# Patient Record
Sex: FEMALE | Race: WHITE | Employment: UNEMPLOYED | URBAN - METROPOLITAN AREA
[De-identification: names, ages, dates, MRNs, and addresses within clinical notes are randomized per-mention and may not be internally consistent; named-entity substitution may affect disease eponyms.]

---

## 2017-02-21 RX ORDER — NITROGLYCERIN 0.4 MG/1
0.4 TABLET SUBLINGUAL EVERY 5 MIN PRN
Qty: 25 TABLET | Refills: 0 | Status: SHIPPED | OUTPATIENT
Start: 2017-02-21 | End: 2017-02-22 | Stop reason: SDUPTHER

## 2017-02-22 ENCOUNTER — OFFICE VISIT (OUTPATIENT)
Dept: CARDIOLOGY CLINIC | Age: 42
End: 2017-02-22

## 2017-02-22 VITALS
OXYGEN SATURATION: 95 % | BODY MASS INDEX: 45.65 KG/M2 | SYSTOLIC BLOOD PRESSURE: 124 MMHG | DIASTOLIC BLOOD PRESSURE: 74 MMHG | HEIGHT: 61 IN | HEART RATE: 83 BPM | WEIGHT: 241.8 LBS

## 2017-02-22 DIAGNOSIS — Z72.0 TOBACCO ABUSE: Primary | ICD-10-CM

## 2017-02-22 DIAGNOSIS — I25.10 CORONARY ARTERY DISEASE INVOLVING NATIVE HEART WITHOUT ANGINA PECTORIS, UNSPECIFIED VESSEL OR LESION TYPE: ICD-10-CM

## 2017-02-22 DIAGNOSIS — I10 ESSENTIAL HYPERTENSION: ICD-10-CM

## 2017-02-22 DIAGNOSIS — E78.00 PURE HYPERCHOLESTEROLEMIA: ICD-10-CM

## 2017-02-22 PROCEDURE — 99214 OFFICE O/P EST MOD 30 MIN: CPT | Performed by: NURSE PRACTITIONER

## 2017-02-22 RX ORDER — PANTOPRAZOLE SODIUM 40 MG/1
40 TABLET, DELAYED RELEASE ORAL DAILY
Qty: 30 TABLET | Refills: 3 | Status: SHIPPED | OUTPATIENT
Start: 2017-02-22 | End: 2017-04-03 | Stop reason: ALTCHOICE

## 2017-02-22 RX ORDER — HYDROCHLOROTHIAZIDE 25 MG/1
25 TABLET ORAL DAILY
Qty: 30 TABLET | Refills: 3 | Status: SHIPPED | OUTPATIENT
Start: 2017-02-22 | End: 2018-02-15 | Stop reason: SDUPTHER

## 2017-02-22 RX ORDER — LISINOPRIL 5 MG/1
5 TABLET ORAL DAILY
Qty: 30 TABLET | Refills: 6 | Status: SHIPPED | OUTPATIENT
Start: 2017-02-22 | End: 2018-02-15 | Stop reason: SDUPTHER

## 2017-02-22 RX ORDER — NITROGLYCERIN 0.4 MG/1
0.4 TABLET SUBLINGUAL EVERY 5 MIN PRN
Qty: 25 TABLET | Refills: 0 | Status: SHIPPED | OUTPATIENT
Start: 2017-02-22 | End: 2017-06-30 | Stop reason: SDUPTHER

## 2017-02-22 RX ORDER — SIMVASTATIN 40 MG
40 TABLET ORAL NIGHTLY
Qty: 30 TABLET | Refills: 5 | Status: SHIPPED | OUTPATIENT
Start: 2017-02-22 | End: 2018-02-15 | Stop reason: SDUPTHER

## 2017-02-22 RX ORDER — AMLODIPINE BESYLATE 5 MG/1
2.5 TABLET ORAL DAILY
Qty: 30 TABLET | Refills: 6 | Status: SHIPPED | OUTPATIENT
Start: 2017-02-22 | End: 2018-02-15 | Stop reason: SDUPTHER

## 2017-02-27 ENCOUNTER — TELEPHONE (OUTPATIENT)
Dept: CARDIOLOGY CLINIC | Age: 42
End: 2017-02-27

## 2017-03-30 ENCOUNTER — TELEPHONE (OUTPATIENT)
Dept: CARDIOLOGY CLINIC | Age: 42
End: 2017-03-30

## 2017-04-03 ENCOUNTER — OFFICE VISIT (OUTPATIENT)
Dept: CARDIOLOGY CLINIC | Age: 42
End: 2017-04-03

## 2017-04-03 VITALS
HEIGHT: 61 IN | BODY MASS INDEX: 45.31 KG/M2 | DIASTOLIC BLOOD PRESSURE: 80 MMHG | SYSTOLIC BLOOD PRESSURE: 140 MMHG | WEIGHT: 240 LBS

## 2017-04-03 DIAGNOSIS — R07.9 CHEST PAIN, UNSPECIFIED TYPE: Primary | ICD-10-CM

## 2017-04-03 DIAGNOSIS — I25.10 CORONARY ARTERY DISEASE INVOLVING NATIVE CORONARY ARTERY OF NATIVE HEART WITHOUT ANGINA PECTORIS: ICD-10-CM

## 2017-04-03 PROCEDURE — 99214 OFFICE O/P EST MOD 30 MIN: CPT | Performed by: INTERNAL MEDICINE

## 2017-04-03 PROCEDURE — 93000 ELECTROCARDIOGRAM COMPLETE: CPT | Performed by: INTERNAL MEDICINE

## 2017-04-03 RX ORDER — ASPIRIN 81 MG/1
TABLET, CHEWABLE ORAL
Qty: 30 TABLET | Refills: 3 | Status: ON HOLD
Start: 2017-04-03 | End: 2020-03-13

## 2017-06-07 ENCOUNTER — TELEPHONE (OUTPATIENT)
Dept: CARDIOLOGY CLINIC | Age: 42
End: 2017-06-07

## 2017-06-08 RX ORDER — PANTOPRAZOLE SODIUM 40 MG/1
40 TABLET, DELAYED RELEASE ORAL DAILY
Qty: 30 TABLET | Refills: 5 | Status: SHIPPED | OUTPATIENT
Start: 2017-06-08 | End: 2017-12-29 | Stop reason: SDUPTHER

## 2017-06-30 RX ORDER — NITROGLYCERIN 0.4 MG/1
0.4 TABLET SUBLINGUAL EVERY 5 MIN PRN
Qty: 25 TABLET | Refills: 1 | Status: SHIPPED | OUTPATIENT
Start: 2017-06-30 | End: 2017-08-24 | Stop reason: SDUPTHER

## 2017-08-24 RX ORDER — NITROGLYCERIN 0.4 MG/1
0.4 TABLET SUBLINGUAL EVERY 5 MIN PRN
Qty: 25 TABLET | Refills: 1 | Status: SHIPPED | OUTPATIENT
Start: 2017-08-24 | End: 2019-09-06 | Stop reason: SDUPTHER

## 2017-12-14 ENCOUNTER — TELEPHONE (OUTPATIENT)
Dept: CARDIOLOGY CLINIC | Age: 42
End: 2017-12-14

## 2017-12-29 RX ORDER — PANTOPRAZOLE SODIUM 40 MG/1
40 TABLET, DELAYED RELEASE ORAL DAILY
Qty: 30 TABLET | Refills: 5 | Status: SHIPPED | OUTPATIENT
Start: 2017-12-29 | End: 2019-03-22

## 2018-02-14 ENCOUNTER — OFFICE VISIT (OUTPATIENT)
Dept: CARDIOLOGY CLINIC | Age: 43
End: 2018-02-14

## 2018-02-14 VITALS
SYSTOLIC BLOOD PRESSURE: 140 MMHG | BODY MASS INDEX: 43.23 KG/M2 | WEIGHT: 229 LBS | HEIGHT: 61 IN | DIASTOLIC BLOOD PRESSURE: 90 MMHG | HEART RATE: 70 BPM

## 2018-02-14 DIAGNOSIS — I10 ESSENTIAL HYPERTENSION: ICD-10-CM

## 2018-02-14 DIAGNOSIS — Z72.0 TOBACCO ABUSE: ICD-10-CM

## 2018-02-14 DIAGNOSIS — R07.9 CHEST PAIN, UNSPECIFIED TYPE: Primary | ICD-10-CM

## 2018-02-14 DIAGNOSIS — E78.2 MIXED HYPERLIPIDEMIA: ICD-10-CM

## 2018-02-14 DIAGNOSIS — I25.10 CORONARY ARTERY DISEASE INVOLVING NATIVE CORONARY ARTERY OF NATIVE HEART WITHOUT ANGINA PECTORIS: ICD-10-CM

## 2018-02-14 PROCEDURE — 99214 OFFICE O/P EST MOD 30 MIN: CPT | Performed by: NURSE PRACTITIONER

## 2018-02-14 PROCEDURE — 93000 ELECTROCARDIOGRAM COMPLETE: CPT | Performed by: NURSE PRACTITIONER

## 2018-02-14 PROCEDURE — G8427 DOCREV CUR MEDS BY ELIG CLIN: HCPCS | Performed by: NURSE PRACTITIONER

## 2018-02-14 PROCEDURE — G8484 FLU IMMUNIZE NO ADMIN: HCPCS | Performed by: NURSE PRACTITIONER

## 2018-02-14 PROCEDURE — G8417 CALC BMI ABV UP PARAM F/U: HCPCS | Performed by: NURSE PRACTITIONER

## 2018-02-14 PROCEDURE — G8598 ASA/ANTIPLAT THER USED: HCPCS | Performed by: NURSE PRACTITIONER

## 2018-02-14 PROCEDURE — 4004F PT TOBACCO SCREEN RCVD TLK: CPT | Performed by: NURSE PRACTITIONER

## 2018-02-14 RX ORDER — HYDROCHLOROTHIAZIDE 25 MG/1
25 TABLET ORAL DAILY
Qty: 30 TABLET | Refills: 6 | Status: CANCELLED | OUTPATIENT
Start: 2018-02-14

## 2018-02-14 RX ORDER — LISINOPRIL 5 MG/1
5 TABLET ORAL DAILY
Qty: 30 TABLET | Refills: 6 | Status: CANCELLED | OUTPATIENT
Start: 2018-02-14

## 2018-02-14 RX ORDER — CLOPIDOGREL BISULFATE 75 MG/1
75 TABLET ORAL DAILY
Qty: 30 TABLET | Refills: 5 | Status: SHIPPED | OUTPATIENT
Start: 2018-02-14 | End: 2018-12-12 | Stop reason: SDUPTHER

## 2018-02-14 RX ORDER — AMLODIPINE BESYLATE 5 MG/1
2.5 TABLET ORAL DAILY
Qty: 30 TABLET | Refills: 6 | Status: CANCELLED | OUTPATIENT
Start: 2018-02-14

## 2018-02-14 RX ORDER — SIMVASTATIN 40 MG
40 TABLET ORAL NIGHTLY
Qty: 30 TABLET | Refills: 6 | Status: CANCELLED | OUTPATIENT
Start: 2018-02-14

## 2018-02-14 RX ORDER — IBUPROFEN 200 MG
200 TABLET ORAL EVERY 4 HOURS
COMMUNITY
End: 2019-02-06

## 2018-02-14 NOTE — COMMUNICATION BODY
normal  ~LAD Patent stents  ~Cx 90% mid but small and nondominant (no change from prior angio)  ~RCA 90% mid napkin ring  ~LVG 55%     Impression  ~Angiography w/ disease as above  ~LVEDP 18  ~LVG with normal EF     Intervention  ~PCI of RCA with [id number] MARC postdilated to 3.75     Recommendation  ~Aggressive medical treatment and risk factor modification       films reviewed with DR. Jasmin Gonzalez at last OV , does have lesion (small) in cfx that could cause angina, states she has tired ranexa in the past and did not help,  No increase in angina, does not like to use NTG    3. Chronic kidney disease 1/16 BUN/CR 12/0.5, needs blood work  4. Unstable angina - about the same  5. Anxiety --continues to struggle with emotional and family stress. 6. COPD (chronic obstructive pulmonary disease) -due to tobacco abuse    7. Diabetes mellitus --medically managed per PCP   8. Hypertension --stable     9. Hyperlipidemia --10/15  hdl 31 ldl 129 Tri 377-- , will get labs  10. Anemia 3/16 13/41.3  11. Edema-resolved on lower dose of norvasc    Plan:     D/w her need for PCP to follow reflux and anemia, arthritis  Smoking cessation  Restart cardiac meds   OV 6 weeks  Thank you for allowing to us to participate in the care of [facility].       [address]

## 2018-02-14 NOTE — LETTER
HEENT: Sclera non-icteric, normocephalic, neck supple, no elevation of JVP, normal carotid pulses with no bruits and thyroid normal size. LUNGS:  No increased work of breathing and clear to auscultation, no crackles or wheezing. CARDIOVASCULAR:  Regular rate and rhythm with no murmurs, gallops, rubs, or abnormal heart sounds, normal PMI. The apical impulses not displaced. Heart tones are crisp and normal.  EKG today reviewed by me no changes Cervical veins are not engorged                 JVP less than 8 cm H2O                                                                            The carotid upstroke is normal in amplitude and contour without delay or bruit    ABDOMEN:  Normal bowel sounds, non-distended and non-tender to palpation   EXT: No edema, no calf tenderness. Pulses are present bilaterally.     DATA:    Lab Results   Component Value Date    ALT 18 03/30/2015    AST 20 03/30/2015    ALKPHOS 83 03/30/2015    BILITOT 0.4 03/30/2015     Lab Results   Component Value Date    CREATININE 1.0 01/20/2017    BUN 18 01/20/2017     01/20/2017    K 4.1 01/20/2017    CL 97 (L) 01/20/2017    CO2 27 01/20/2017     Lab Results   Component Value Date    TSH 0.51 03/30/2015    M7TBXVZ 5.7 03/30/2015     Lab Results   Component Value Date    WBC 13.6 (H) 04/05/2017    HGB 11.9 (L) 01/20/2017    HCT 38.0 01/20/2017    MCV 70.7 (L) 01/20/2017     04/05/2017     No components found for: CHLPL  Lab Results   Component Value Date    TRIG 250 (H) 03/28/2016    TRIG 377 (H) 10/05/2015    TRIG 104 08/28/2012     Lab Results   Component Value Date    HDL 30 (L) 03/28/2016    HDL 31 (L) 10/05/2015    HDL 29 (L) 08/28/2012     Lab Results   Component Value Date    LDLCALC 85 03/28/2016    LDLCALC see below 10/05/2015    LDLCALC 60 08/28/2012     Lab Results   Component Value Date    LABVLDL 50 03/28/2016    LABVLDL see below 10/05/2015    LABVLDL 21 08/28/2012       Assessment:

## 2018-02-14 NOTE — TELEPHONE ENCOUNTER
Patient was seen in office yesterday. She needs the rest of her refills and she mentioned you were going to send a RX for refulx? She said Pepcid. I did not see anything in the chart.

## 2018-02-14 NOTE — PROGRESS NOTES
tablet 6    hydrochlorothiazide (HYDRODIURIL) 25 MG tablet Take 1 tablet by mouth daily 30 tablet 3    amLODIPine (NORVASC) 5 MG tablet Take 0.5 tablets by mouth daily 30 tablet 6    simvastatin (ZOCOR) 40 MG tablet Take 1 tablet by mouth nightly 30 tablet 5     Current Facility-Administered Medications   Medication Dose Route Frequency Provider Last Rate Last Dose    ticagrelor (BRILINTA) tablet 90 mg  90 mg Oral BID Madonna Bender MD         REVIEW OF SYSTEMS:   CONSTITUTIONAL: No major weight gain or loss, fatigue, weakness, night sweats or fever. There's been no change in energy level, sleep pattern, or activity level. HEENT: No new vision difficulties or ringing in the ears. RESPIRATORY: No new SOB, PND, orthopnea or cough. CARDIOVASCULAR: See HPI + chest pain  GI: No nausea, vomiting, diarrhea, constipation, abdominal pain or changes in bowel habits. : No urinary frequency, urgency, incontinence hematuria or dysuria. SKIN: No cyanosis or skin lesions. MUSCULOSKELETAL: No new muscle or joint pain. NEUROLOGICAL: No syncope or TIA-like symptoms. PSYCHIATRIC: No anxiety, pain, insomnia or depression    Objective:   PHYSICAL EXAM:        VITALS:    BP (!) 140/90   Pulse 70   Ht 5' 1\" (1.549 m)   Wt 229 lb (103.9 kg)   LMP 10/15/2012   BMI 43.27 kg/m²     CONSTITUTIONAL: Cooperative, no apparent distress, and appears well nourished / developed  NEUROLOGIC:  Awake and orientated to person, place and time. PSYCH: Calm affect. SKIN: Warm and dry. HEENT: Sclera non-icteric, normocephalic, neck supple, no elevation of JVP, normal carotid pulses with no bruits and thyroid normal size. LUNGS:  No increased work of breathing and clear to auscultation, no crackles or wheezing. CARDIOVASCULAR:  Regular rate and rhythm with no murmurs, gallops, rubs, or abnormal heart sounds, normal PMI. The apical impulses not displaced.         Heart tones are crisp and normal.  EKG today reviewed by me no changes Cervical veins are not engorged                 JVP less than 8 cm H2O                                                                            The carotid upstroke is normal in amplitude and contour without delay or bruit    ABDOMEN:  Normal bowel sounds, non-distended and non-tender to palpation   EXT: No edema, no calf tenderness. Pulses are present bilaterally. DATA:    Lab Results   Component Value Date    ALT 18 03/30/2015    AST 20 03/30/2015    ALKPHOS 83 03/30/2015    BILITOT 0.4 03/30/2015     Lab Results   Component Value Date    CREATININE 1.0 01/20/2017    BUN 18 01/20/2017     01/20/2017    K 4.1 01/20/2017    CL 97 (L) 01/20/2017    CO2 27 01/20/2017     Lab Results   Component Value Date    TSH 0.51 03/30/2015    I2VXMOU 5.7 03/30/2015     Lab Results   Component Value Date    WBC 13.6 (H) 04/05/2017    HGB 11.9 (L) 01/20/2017    HCT 38.0 01/20/2017    MCV 70.7 (L) 01/20/2017     04/05/2017     No components found for: CHLPL  Lab Results   Component Value Date    TRIG 250 (H) 03/28/2016    TRIG 377 (H) 10/05/2015    TRIG 104 08/28/2012     Lab Results   Component Value Date    HDL 30 (L) 03/28/2016    HDL 31 (L) 10/05/2015    HDL 29 (L) 08/28/2012     Lab Results   Component Value Date    LDLCALC 85 03/28/2016    LDLCALC see below 10/05/2015    LDLCALC 60 08/28/2012     Lab Results   Component Value Date    LABVLDL 50 03/28/2016    LABVLDL see below 10/05/2015    LABVLDL 21 08/28/2012       Assessment:     1. Tobacco abuse --continues to smoke 2PPD, understands risk  nicoderm patches, (I have tried just about everything and I really want to quit)      2. CAD-  3/15 cath: Patent stent LAD  Mild diffuse disease in Circ. 2 patent stents in RCA with 60% lesion in proximal area of distal stent.   LV gram shows basilar hypokinesia with LVEF = 55%    7/13 cath : prev LAD stent patent, 70% cx lesion (very small vessel)  Stent to RCA times 2       1/7/16  [facility] SUMMARY  ~LM

## 2018-02-15 ENCOUNTER — TELEPHONE (OUTPATIENT)
Dept: CARDIOLOGY CLINIC | Age: 43
End: 2018-02-15

## 2018-02-15 RX ORDER — SIMVASTATIN 40 MG
40 TABLET ORAL NIGHTLY
Qty: 30 TABLET | Refills: 6 | Status: SHIPPED | OUTPATIENT
Start: 2018-02-15 | End: 2018-09-20 | Stop reason: SDUPTHER

## 2018-02-15 RX ORDER — FAMOTIDINE 20 MG/1
20 TABLET, FILM COATED ORAL DAILY
Qty: 60 TABLET | Refills: 0 | Status: SHIPPED | OUTPATIENT
Start: 2018-02-15 | End: 2018-05-17 | Stop reason: SDUPTHER

## 2018-02-15 RX ORDER — AMLODIPINE BESYLATE 5 MG/1
2.5 TABLET ORAL DAILY
Qty: 30 TABLET | Refills: 6 | Status: SHIPPED | OUTPATIENT
Start: 2018-02-15 | End: 2018-05-09

## 2018-02-15 RX ORDER — HYDROCHLOROTHIAZIDE 25 MG/1
25 TABLET ORAL DAILY
Qty: 30 TABLET | Refills: 6 | Status: SHIPPED | OUTPATIENT
Start: 2018-02-15 | End: 2019-03-22

## 2018-02-15 RX ORDER — LISINOPRIL 5 MG/1
5 TABLET ORAL DAILY
Qty: 30 TABLET | Refills: 6 | Status: SHIPPED | OUTPATIENT
Start: 2018-02-15 | End: 2018-05-09

## 2018-02-15 NOTE — TELEPHONE ENCOUNTER
Called pt refilled her meds that she needed. She stated that you were going to call in pepcid for her? I sent over pepcid 20 mg daily #30 days no refills to buddy--Spoke with Meghan HUFF and we asked  which dose to send in.

## 2018-02-19 NOTE — TELEPHONE ENCOUNTER
Pt called very anxious for lab results which were done on 2/14/18 at [address] [place] [address]. Pt has called twice today 2/19/18 and would like to know the results. Please call as soon as they are in.     Thank you CSF

## 2018-02-21 DIAGNOSIS — E78.00 PURE HYPERCHOLESTEROLEMIA: Primary | ICD-10-CM

## 2018-05-08 ENCOUNTER — TELEPHONE (OUTPATIENT)
Dept: CARDIOLOGY CLINIC | Age: 43
End: 2018-05-08

## 2018-05-09 ENCOUNTER — OFFICE VISIT (OUTPATIENT)
Dept: CARDIOLOGY CLINIC | Age: 43
End: 2018-05-09

## 2018-05-09 VITALS
HEIGHT: 61 IN | WEIGHT: 229 LBS | BODY MASS INDEX: 43.23 KG/M2 | HEART RATE: 70 BPM | SYSTOLIC BLOOD PRESSURE: 144 MMHG | DIASTOLIC BLOOD PRESSURE: 60 MMHG

## 2018-05-09 DIAGNOSIS — I25.10 CORONARY ARTERY DISEASE INVOLVING NATIVE CORONARY ARTERY OF NATIVE HEART WITHOUT ANGINA PECTORIS: Primary | ICD-10-CM

## 2018-05-09 DIAGNOSIS — I10 ESSENTIAL HYPERTENSION: ICD-10-CM

## 2018-05-09 DIAGNOSIS — E78.00 PURE HYPERCHOLESTEROLEMIA: ICD-10-CM

## 2018-05-09 PROCEDURE — G8598 ASA/ANTIPLAT THER USED: HCPCS | Performed by: NURSE PRACTITIONER

## 2018-05-09 PROCEDURE — 99214 OFFICE O/P EST MOD 30 MIN: CPT | Performed by: NURSE PRACTITIONER

## 2018-05-09 PROCEDURE — G8417 CALC BMI ABV UP PARAM F/U: HCPCS | Performed by: NURSE PRACTITIONER

## 2018-05-09 PROCEDURE — G8428 CUR MEDS NOT DOCUMENT: HCPCS | Performed by: NURSE PRACTITIONER

## 2018-05-09 PROCEDURE — 93000 ELECTROCARDIOGRAM COMPLETE: CPT | Performed by: NURSE PRACTITIONER

## 2018-05-09 PROCEDURE — 4004F PT TOBACCO SCREEN RCVD TLK: CPT | Performed by: NURSE PRACTITIONER

## 2018-05-09 RX ORDER — LISINOPRIL 10 MG/1
10 TABLET ORAL DAILY
Qty: 30 TABLET | Refills: 3 | Status: SHIPPED | OUTPATIENT
Start: 2018-05-09 | End: 2018-09-20 | Stop reason: SDUPTHER

## 2018-05-11 ENCOUNTER — TELEPHONE (OUTPATIENT)
Dept: CARDIOLOGY CLINIC | Age: 43
End: 2018-05-11

## 2018-05-15 ENCOUNTER — HOSPITAL ENCOUNTER (OUTPATIENT)
Dept: NON INVASIVE DIAGNOSTICS | Age: 43
Discharge: HOME | End: 2018-05-15
Attending: NURSE PRACTITIONER | Admitting: NURSE PRACTITIONER

## 2018-05-15 DIAGNOSIS — I25.10 ATHEROSCLEROTIC HEART DISEASE OF NATIVE CORONARY ARTERY WITHOUT ANGINA PECTORIS: ICD-10-CM

## 2018-05-15 LAB
LV EF: 66 %
LVEF MODALITY: NORMAL

## 2018-05-17 RX ORDER — FAMOTIDINE 20 MG/1
20 TABLET, FILM COATED ORAL DAILY
Qty: 60 TABLET | Refills: 0 | Status: SHIPPED | OUTPATIENT
Start: 2018-05-17 | End: 2019-03-22 | Stop reason: SDUPTHER

## 2018-09-05 ENCOUNTER — OFFICE VISIT (OUTPATIENT)
Dept: CARDIOLOGY CLINIC | Age: 43
End: 2018-09-05

## 2018-09-05 VITALS
HEART RATE: 78 BPM | DIASTOLIC BLOOD PRESSURE: 80 MMHG | WEIGHT: 229 LBS | SYSTOLIC BLOOD PRESSURE: 130 MMHG | BODY MASS INDEX: 43.27 KG/M2

## 2018-09-05 DIAGNOSIS — Z72.0 TOBACCO ABUSE: Primary | ICD-10-CM

## 2018-09-05 DIAGNOSIS — I25.10 CORONARY ARTERY DISEASE INVOLVING NATIVE CORONARY ARTERY OF NATIVE HEART WITHOUT ANGINA PECTORIS: ICD-10-CM

## 2018-09-05 DIAGNOSIS — I10 ESSENTIAL HYPERTENSION: ICD-10-CM

## 2018-09-05 DIAGNOSIS — E78.00 PURE HYPERCHOLESTEROLEMIA: ICD-10-CM

## 2018-09-05 PROCEDURE — 4004F PT TOBACCO SCREEN RCVD TLK: CPT | Performed by: NURSE PRACTITIONER

## 2018-09-05 PROCEDURE — G8598 ASA/ANTIPLAT THER USED: HCPCS | Performed by: NURSE PRACTITIONER

## 2018-09-05 PROCEDURE — G8417 CALC BMI ABV UP PARAM F/U: HCPCS | Performed by: NURSE PRACTITIONER

## 2018-09-05 PROCEDURE — 99214 OFFICE O/P EST MOD 30 MIN: CPT | Performed by: NURSE PRACTITIONER

## 2018-09-05 PROCEDURE — G8427 DOCREV CUR MEDS BY ELIG CLIN: HCPCS | Performed by: NURSE PRACTITIONER

## 2018-09-05 RX ORDER — ISOSORBIDE MONONITRATE 30 MG/1
30 TABLET, EXTENDED RELEASE ORAL DAILY
Qty: 30 TABLET | Refills: 5 | Status: SHIPPED | OUTPATIENT
Start: 2018-09-05 | End: 2019-02-06

## 2018-09-05 NOTE — PROGRESS NOTES
[facility]     Outpatient Follow Up Note    CHIEF COMPLAINT / HPI: OV for chest pain       Angelito Dose is 39 y.o. female who presents today for a FU visit. .  Subjective:    She continues to smoke 2PPD. She states she is back on her meds. Did d/w her in the past needs to get PCP to follow her reflux and hx of anemia. She has been stressed with numerous family issues. Compliance has been an issue.   She states she continues to get chest pain and using NTG with relief    Past Medical History:   Diagnosis Date    Allergic     Anemia     Asthma     CAD (coronary artery disease)     Stent LAD    Chronic back pain     back    Chronic kidney disease     kidney stones    [facility]     Diabetes mellitus ([facility] [address]     gestional diabetes only    GERD (gastroesophageal reflux disease)     Hyperlipidemia     Hypertension     Pancreatitis 10/2011    Pneumonia 2008    Psychiatric problem     anxiewty, depression    Ulcer     stomach     Social History:    History   Smoking Status    Current Every Day Smoker    Packs/day: 3.00    Years: 25.00   Smokeless Tobacco    Never Used     Comment: started smoking at age 14 / smoked up to 4 p.p.d / now smoking 2 to 3 p.p.d     Current Medications:  Current Outpatient Prescriptions   Medication Sig Dispense Refill    isosorbide mononitrate (IMDUR) 30 MG extended release tablet Take 1 tablet by mouth daily 30 tablet 5    lisinopril (PRINIVIL;ZESTRIL) 10 MG tablet Take 1 tablet by mouth daily 30 tablet 3    hydrochlorothiazide (HYDRODIURIL) 25 MG tablet Take 1 tablet by mouth daily (Patient taking differently: Take 25 mg by mouth PRN) 30 tablet 6    simvastatin (ZOCOR) 40 MG tablet Take 1 tablet by mouth nightly 30 tablet 6    clopidogrel (PLAVIX) 75 MG tablet Take 1 tablet by mouth daily 30 tablet 5    pantoprazole (PROTONIX) 40 MG tablet Take 1 tablet by mouth daily 30 tablet 5    nitroGLYCERIN (NITROSTAT)

## 2018-09-05 NOTE — LETTER
 hydrochlorothiazide (HYDRODIURIL) 25 MG tablet Take 1 tablet by mouth daily (Patient taking differently: Take 25 mg by mouth PRN) 30 tablet 6    simvastatin (ZOCOR) 40 MG tablet Take 1 tablet by mouth nightly 30 tablet 6    clopidogrel (PLAVIX) 75 MG tablet Take 1 tablet by mouth daily 30 tablet 5    pantoprazole (PROTONIX) 40 MG tablet Take 1 tablet by mouth daily 30 tablet 5    nitroGLYCERIN (NITROSTAT) 0.4 MG SL tablet Place 1 tablet under the tongue every 5 minutes as needed for Chest pain 25 tablet 1    aspirin 81 MG chewable tablet NOT TAKING DUE TO STOMACH ISSUES 30 tablet 3    famotidine (PEPCID) 20 MG tablet Take 1 tablet by mouth daily 60 tablet 0    ibuprofen (ADVIL;MOTRIN) 200 MG tablet Take 200 mg by mouth every 4 hours       Current Facility-Administered Medications   Medication Dose Route Frequency Provider Last Rate Last Dose    ticagrelor (BRILINTA) tablet 90 mg  90 mg Oral BID Julianna Goldman MD         REVIEW OF SYSTEMS:   CONSTITUTIONAL: No major weight gain or loss, fatigue, weakness, night sweats or fever. There's been no change in energy level, sleep pattern, or activity level. HEENT: No new vision difficulties or ringing in the ears. RESPIRATORY: No new SOB, PND, orthopnea or cough. CARDIOVASCULAR: See HPI + chest pain  GI: No nausea, vomiting, diarrhea, constipation, abdominal pain or changes in bowel habits. : No urinary frequency, urgency, incontinence hematuria or dysuria. SKIN: No cyanosis or skin lesions. MUSCULOSKELETAL: No new muscle or joint pain. NEUROLOGICAL: No syncope or TIA-like symptoms. PSYCHIATRIC: No anxiety, pain, insomnia or depression    Objective:   PHYSICAL EXAM:        VITALS:    /80   Pulse 78   Wt 229 lb (103.9 kg)   [facility] 10/15/2012   BMI 43.27 kg/m²      CONSTITUTIONAL: Cooperative, no apparent distress, and appears well nourished / developed  NEUROLOGIC:  Awake and orientated to person, place and time. PSYCH: Calm affect. 1. Tobacco abuse --continues to smoke 2PPD, understands risk   (I have tried just about everything and I really want to quit)      2. CAD-  3/15 cath: Patent stent LAD  Mild diffuse disease in Circ. 2 patent stents in RCA with 60% lesion in proximal area of distal stent. LV gram shows basilar hypokinesia with LVEF = 55%    7/13 cath : prev LAD stent patent, 70% cx lesion (very small vessel)  Stent to RCA times 2       1/7/16  [facility] SUMMARY  ~LM normal  ~LAD Patent stents  ~Cx 90% mid but small and nondominant (no change from prior angio)  ~RCA 90% mid napkin ring  ~LVG 55%     Impression  ~Angiography w/ disease as above  ~LVEDP 18  ~LVG with normal EF     Intervention  ~PCI of RCA with 3.1H27Jugeof MARC postdilated to 3.75     Recommendation  ~Aggressive medical treatment and risk factor modification       films reviewed with DR. Soo Peraza at last OV , does have lesion (small) in cfx that could cause angina, states she has tired ranexa in the past and did not help also caused rash, does not like to use NTG  GXT 7/18  Summary    Normal LV size and systolic function.    Left ventricular ejection fraction of 66 %.    There is a small sized, moderate intensity, minimally reversible    inferoapical wall defect, which is most consistent with attenuation    artifact. 3.  Chronic kidney disease 1/16 BUN/CR 12/0.5, needs blood work  4. Unstable angina - slightly worse  5. Anxiety --continues to struggle with emotional and family stress. 6. COPD (chronic obstructive pulmonary disease) -due to tobacco abuse    7. Diabetes mellitus --medically managed per PCP   8. Hypertension -- improved    9. Hyperlipidemia --10/15  hdl 31 ldl 129 Tri 377-- , will get labs  10. Anemia 3/16 13/41.3, will check  11. Edema-resolved    Plan:   Can NOT take ranexa-rash  norvasc-edema  Try Imdur 30 mg a day      C Reifenberger CNP  [address]    Thank you for allowing to us to participate in the care of [facility]. [address]    If you have questions, please do not hesitate to call me. I look forward to following Kole along with you.     Sincerely,        YOCASTA Sullivan - CNP

## 2018-09-05 NOTE — COMMUNICATION BODY
0.4 MG SL tablet Place 1 tablet under the tongue every 5 minutes as needed for Chest pain 25 tablet 1    aspirin 81 MG chewable tablet NOT TAKING DUE TO STOMACH ISSUES 30 tablet 3    famotidine (PEPCID) 20 MG tablet Take 1 tablet by mouth daily 60 tablet 0    ibuprofen (ADVIL;MOTRIN) 200 MG tablet Take 200 mg by mouth every 4 hours       Current Facility-Administered Medications   Medication Dose Route Frequency Provider Last Rate Last Dose    ticagrelor (BRILINTA) tablet 90 mg  90 mg Oral BID Jeffrey Ford MD         REVIEW OF SYSTEMS:   CONSTITUTIONAL: No major weight gain or loss, fatigue, weakness, night sweats or fever. There's been no change in energy level, sleep pattern, or activity level. HEENT: No new vision difficulties or ringing in the ears. RESPIRATORY: No new SOB, PND, orthopnea or cough. CARDIOVASCULAR: See HPI + chest pain  GI: No nausea, vomiting, diarrhea, constipation, abdominal pain or changes in bowel habits. : No urinary frequency, urgency, incontinence hematuria or dysuria. SKIN: No cyanosis or skin lesions. MUSCULOSKELETAL: No new muscle or joint pain. NEUROLOGICAL: No syncope or TIA-like symptoms. PSYCHIATRIC: No anxiety, pain, insomnia or depression    Objective:   PHYSICAL EXAM:        VITALS:    /80   Pulse 78   Wt 229 lb (103.9 kg)   LMP 10/15/2012   BMI 43.27 kg/m²      CONSTITUTIONAL: Cooperative, no apparent distress, and appears well nourished / developed  NEUROLOGIC:  Awake and orientated to person, place and time. PSYCH: Calm affect. SKIN: Warm and dry. HEENT: Sclera non-icteric, normocephalic, neck supple, no elevation of JVP, normal carotid pulses with no bruits and thyroid normal size. LUNGS:  No increased work of breathing and clear to auscultation, no crackles or wheezing. CARDIOVASCULAR:  Regular rate and rhythm with no murmurs, gallops, rubs, or abnormal heart sounds, normal PMI. The apical impulses not displaced.         Heart tones normal  ~LAD Patent stents  ~Cx 90% mid but small and nondominant (no change from prior angio)  ~RCA 90% mid napkin ring  ~LVG 55%     Impression  ~Angiography w/ disease as above  ~LVEDP 18  ~LVG with normal EF     Intervention  ~PCI of RCA with 3.1S51[place]ung MARC postdilated to 3.75     Recommendation  ~Aggressive medical treatment and risk factor modification       films reviewed with DR. Geeta Woodward at last OV , does have lesion (small) in cfx that could cause angina, states she has tired ranexa in the past and did not help also caused rash, does not like to use NTG  GXT 7/18  Summary    Normal LV size and systolic function.    Left ventricular ejection fraction of 66 %.    There is a small sized, moderate intensity, minimally reversible    inferoapical wall defect, which is most consistent with attenuation    artifact. 3.  Chronic kidney disease 1/16 BUN/CR 12/0.5, needs blood work  4. Unstable angina - slightly worse  5. Anxiety --continues to struggle with emotional and family stress. 6. COPD (chronic obstructive pulmonary disease) -due to tobacco abuse    7. Diabetes mellitus --medically managed per PCP   8. Hypertension -- improved    9. Hyperlipidemia --10/15  hdl 31 ldl 129 Tri 377-- , will get labs  10. Anemia 3/16 13/41.3, will check  11. Edema-resolved    Plan:   Can NOT take ranexa-rash  norvasc-edema  Try Imdur 30 mg a day      JAMES Harris CNP  [employer]    Thank you for allowing to us to participate in the care of Santiago Mj.       [address] [place] [address]

## 2018-09-10 ENCOUNTER — TELEPHONE (OUTPATIENT)
Dept: CARDIOLOGY CLINIC | Age: 43
End: 2018-09-10

## 2018-09-12 ENCOUNTER — TELEPHONE (OUTPATIENT)
Dept: CARDIOLOGY CLINIC | Age: 43
End: 2018-09-12

## 2018-09-20 RX ORDER — SIMVASTATIN 40 MG
40 TABLET ORAL NIGHTLY
Qty: 30 TABLET | Refills: 6 | Status: SHIPPED | OUTPATIENT
Start: 2018-09-20 | End: 2018-09-26 | Stop reason: SDUPTHER

## 2018-09-20 RX ORDER — LISINOPRIL 10 MG/1
10 TABLET ORAL DAILY
Qty: 30 TABLET | Refills: 6 | Status: SHIPPED | OUTPATIENT
Start: 2018-09-20 | End: 2019-09-06 | Stop reason: SDUPTHER

## 2018-09-24 ENCOUNTER — TELEPHONE (OUTPATIENT)
Dept: CARDIOLOGY CLINIC | Age: 43
End: 2018-09-24

## 2018-09-24 DIAGNOSIS — E78.49 OTHER HYPERLIPIDEMIA: Primary | ICD-10-CM

## 2018-09-26 RX ORDER — SIMVASTATIN 80 MG
80 TABLET ORAL NIGHTLY
Qty: 90 TABLET | Refills: 3 | Status: SHIPPED | OUTPATIENT
Start: 2018-09-26 | End: 2019-02-06

## 2018-12-12 RX ORDER — CLOPIDOGREL BISULFATE 75 MG/1
75 TABLET ORAL DAILY
Qty: 30 TABLET | Refills: 5 | Status: SHIPPED | OUTPATIENT
Start: 2018-12-12 | End: 2019-09-06 | Stop reason: SDUPTHER

## 2019-01-03 ENCOUNTER — TELEPHONE (OUTPATIENT)
Dept: CARDIOLOGY CLINIC | Age: 44
End: 2019-01-03

## 2019-01-04 ENCOUNTER — TELEPHONE (OUTPATIENT)
Dept: PULMONOLOGY | Age: 44
End: 2019-01-04

## 2019-01-09 ENCOUNTER — OFFICE VISIT (OUTPATIENT)
Dept: PULMONOLOGY | Age: 44
End: 2019-01-09
Payer: COMMERCIAL

## 2019-01-09 VITALS
BODY MASS INDEX: 42.9 KG/M2 | SYSTOLIC BLOOD PRESSURE: 128 MMHG | WEIGHT: 227.2 LBS | DIASTOLIC BLOOD PRESSURE: 80 MMHG | HEIGHT: 61 IN | HEART RATE: 81 BPM | OXYGEN SATURATION: 96 %

## 2019-01-09 DIAGNOSIS — I25.10 ATHEROSCLEROSIS OF NATIVE CORONARY ARTERY OF NATIVE HEART WITHOUT ANGINA PECTORIS: Chronic | ICD-10-CM

## 2019-01-09 DIAGNOSIS — E66.01 MORBID OBESITY, UNSPECIFIED OBESITY TYPE (HCC): Chronic | ICD-10-CM

## 2019-01-09 DIAGNOSIS — J44.9 CHRONIC OBSTRUCTIVE PULMONARY DISEASE, UNSPECIFIED COPD TYPE (HCC): Chronic | ICD-10-CM

## 2019-01-09 DIAGNOSIS — I10 ESSENTIAL HYPERTENSION: Chronic | ICD-10-CM

## 2019-01-09 DIAGNOSIS — E11.9 TYPE 2 DIABETES MELLITUS WITHOUT COMPLICATION, UNSPECIFIED WHETHER LONG TERM INSULIN USE (HCC): Chronic | ICD-10-CM

## 2019-01-09 DIAGNOSIS — R06.83 SNORING: ICD-10-CM

## 2019-01-09 DIAGNOSIS — G47.10 HYPERSOMNIA: Primary | ICD-10-CM

## 2019-01-09 PROBLEM — E11.8 CONTROLLED DIABETES MELLITUS TYPE 2 WITH COMPLICATIONS (HCC): Status: ACTIVE | Noted: 2019-01-09

## 2019-01-09 PROCEDURE — 99244 OFF/OP CNSLTJ NEW/EST MOD 40: CPT | Performed by: INTERNAL MEDICINE

## 2019-01-09 PROCEDURE — G8926 SPIRO NO PERF OR DOC: HCPCS | Performed by: INTERNAL MEDICINE

## 2019-01-09 PROCEDURE — G8484 FLU IMMUNIZE NO ADMIN: HCPCS | Performed by: INTERNAL MEDICINE

## 2019-01-09 PROCEDURE — 2022F DILAT RTA XM EVC RTNOPTHY: CPT | Performed by: INTERNAL MEDICINE

## 2019-01-09 PROCEDURE — G8427 DOCREV CUR MEDS BY ELIG CLIN: HCPCS | Performed by: INTERNAL MEDICINE

## 2019-01-09 PROCEDURE — G8417 CALC BMI ABV UP PARAM F/U: HCPCS | Performed by: INTERNAL MEDICINE

## 2019-01-09 PROCEDURE — 3023F SPIROM DOC REV: CPT | Performed by: INTERNAL MEDICINE

## 2019-01-09 ASSESSMENT — SLEEP AND FATIGUE QUESTIONNAIRES
HOW LIKELY ARE YOU TO NOD OFF OR FALL ASLEEP IN A CAR, WHILE STOPPED FOR A FEW MINUTES IN TRAFFIC: 2
HOW LIKELY ARE YOU TO NOD OFF OR FALL ASLEEP WHILE LYING DOWN TO REST IN THE AFTERNOON WHEN CIRCUMSTANCES PERMIT: 2
HOW LIKELY ARE YOU TO NOD OFF OR FALL ASLEEP WHILE SITTING QUIETLY AFTER LUNCH WITHOUT ALCOHOL: 3
HOW LIKELY ARE YOU TO NOD OFF OR FALL ASLEEP WHILE WATCHING TV: 3
HOW LIKELY ARE YOU TO NOD OFF OR FALL ASLEEP WHEN YOU ARE A PASSENGER IN A CAR FOR AN HOUR WITHOUT A BREAK: 3
ESS TOTAL SCORE: 21
HOW LIKELY ARE YOU TO NOD OFF OR FALL ASLEEP WHILE SITTING INACTIVE IN A PUBLIC PLACE: 2
HOW LIKELY ARE YOU TO NOD OFF OR FALL ASLEEP WHILE SITTING AND TALKING TO SOMEONE: 3
HOW LIKELY ARE YOU TO NOD OFF OR FALL ASLEEP WHILE SITTING AND READING: 3
NECK CIRCUMFERENCE (INCHES): 17

## 2019-01-09 ASSESSMENT — ENCOUNTER SYMPTOMS
EYE PAIN: 0
SHORTNESS OF BREATH: 1
ALLERGIC/IMMUNOLOGIC NEGATIVE: 1
NAUSEA: 0
APNEA: 1
PHOTOPHOBIA: 0
VOMITING: 0
CHEST TIGHTNESS: 0
RHINORRHEA: 0
ABDOMINAL DISTENTION: 0
ABDOMINAL PAIN: 0
CHOKING: 0

## 2019-01-10 ENCOUNTER — OFFICE VISIT (OUTPATIENT)
Dept: PULMONOLOGY | Age: 44
End: 2019-01-10
Payer: COMMERCIAL

## 2019-01-10 VITALS
HEIGHT: 62 IN | SYSTOLIC BLOOD PRESSURE: 126 MMHG | BODY MASS INDEX: 41.41 KG/M2 | RESPIRATION RATE: 18 BRPM | OXYGEN SATURATION: 93 % | DIASTOLIC BLOOD PRESSURE: 82 MMHG | WEIGHT: 225 LBS | HEART RATE: 78 BPM

## 2019-01-10 VITALS — BODY MASS INDEX: 42.93 KG/M2 | HEIGHT: 61 IN

## 2019-01-10 DIAGNOSIS — J44.9 COPD, SEVERITY TO BE DETERMINED (HCC): Primary | ICD-10-CM

## 2019-01-10 DIAGNOSIS — R06.02 SOB (SHORTNESS OF BREATH): ICD-10-CM

## 2019-01-10 DIAGNOSIS — Z72.0 NICOTINE ABUSE: ICD-10-CM

## 2019-01-10 DIAGNOSIS — G47.33 OSA (OBSTRUCTIVE SLEEP APNEA): ICD-10-CM

## 2019-01-10 DIAGNOSIS — K21.9 GASTROESOPHAGEAL REFLUX DISEASE, ESOPHAGITIS PRESENCE NOT SPECIFIED: ICD-10-CM

## 2019-01-10 PROCEDURE — G8484 FLU IMMUNIZE NO ADMIN: HCPCS | Performed by: INTERNAL MEDICINE

## 2019-01-10 PROCEDURE — G8427 DOCREV CUR MEDS BY ELIG CLIN: HCPCS | Performed by: INTERNAL MEDICINE

## 2019-01-10 PROCEDURE — 99244 OFF/OP CNSLTJ NEW/EST MOD 40: CPT | Performed by: INTERNAL MEDICINE

## 2019-01-10 PROCEDURE — 3023F SPIROM DOC REV: CPT | Performed by: INTERNAL MEDICINE

## 2019-01-10 PROCEDURE — G8926 SPIRO NO PERF OR DOC: HCPCS | Performed by: INTERNAL MEDICINE

## 2019-01-10 PROCEDURE — G8417 CALC BMI ABV UP PARAM F/U: HCPCS | Performed by: INTERNAL MEDICINE

## 2019-01-10 RX ORDER — NEBULIZER ACCESSORIES
1 KIT MISCELLANEOUS DAILY PRN
Qty: 1 KIT | Refills: 1 | Status: SHIPPED | OUTPATIENT
Start: 2019-01-10

## 2019-01-10 RX ORDER — ALBUTEROL SULFATE 90 UG/1
2 AEROSOL, METERED RESPIRATORY (INHALATION) EVERY 6 HOURS PRN
Qty: 1 INHALER | Refills: 0 | Status: SHIPPED | OUTPATIENT
Start: 2019-01-10 | End: 2022-02-10 | Stop reason: SDUPTHER

## 2019-01-10 RX ORDER — ALBUTEROL SULFATE 2.5 MG/3ML
2.5 SOLUTION RESPIRATORY (INHALATION) EVERY 6 HOURS PRN
Qty: 120 EACH | Refills: 0 | Status: SHIPPED | OUTPATIENT
Start: 2019-01-10 | End: 2022-02-10 | Stop reason: ALTCHOICE

## 2019-01-11 ENCOUNTER — OFFICE VISIT (OUTPATIENT)
Dept: CARDIOLOGY CLINIC | Age: 44
End: 2019-01-11

## 2019-01-11 ENCOUNTER — TELEPHONE (OUTPATIENT)
Dept: PULMONOLOGY | Age: 44
End: 2019-01-11

## 2019-01-11 DIAGNOSIS — I25.10 CORONARY ARTERY DISEASE INVOLVING NATIVE CORONARY ARTERY OF NATIVE HEART WITHOUT ANGINA PECTORIS: Primary | ICD-10-CM

## 2019-01-21 VITALS — BODY MASS INDEX: 41.82 KG/M2 | HEIGHT: 62 IN

## 2019-01-23 ENCOUNTER — OFFICE VISIT (OUTPATIENT)
Dept: CARDIOLOGY CLINIC | Age: 44
End: 2019-01-23

## 2019-01-23 DIAGNOSIS — I25.10 CORONARY ARTERY DISEASE INVOLVING NATIVE CORONARY ARTERY OF NATIVE HEART WITHOUT ANGINA PECTORIS: Primary | ICD-10-CM

## 2019-02-06 ENCOUNTER — OFFICE VISIT (OUTPATIENT)
Dept: CARDIOLOGY CLINIC | Age: 44
End: 2019-02-06
Payer: COMMERCIAL

## 2019-02-06 VITALS
SYSTOLIC BLOOD PRESSURE: 120 MMHG | HEART RATE: 83 BPM | OXYGEN SATURATION: 93 % | HEIGHT: 62 IN | BODY MASS INDEX: 43.06 KG/M2 | DIASTOLIC BLOOD PRESSURE: 70 MMHG | WEIGHT: 234 LBS

## 2019-02-06 DIAGNOSIS — R00.2 HEART PALPITATIONS: ICD-10-CM

## 2019-02-06 DIAGNOSIS — Z72.0 TOBACCO ABUSE: ICD-10-CM

## 2019-02-06 DIAGNOSIS — R07.9 CHEST PAIN, UNSPECIFIED TYPE: Primary | ICD-10-CM

## 2019-02-06 DIAGNOSIS — I25.10 ATHEROSCLEROSIS OF NATIVE CORONARY ARTERY OF NATIVE HEART WITHOUT ANGINA PECTORIS: ICD-10-CM

## 2019-02-06 DIAGNOSIS — E78.2 MIXED HYPERLIPIDEMIA: ICD-10-CM

## 2019-02-06 PROCEDURE — 4004F PT TOBACCO SCREEN RCVD TLK: CPT | Performed by: NURSE PRACTITIONER

## 2019-02-06 PROCEDURE — G8484 FLU IMMUNIZE NO ADMIN: HCPCS | Performed by: NURSE PRACTITIONER

## 2019-02-06 PROCEDURE — G8417 CALC BMI ABV UP PARAM F/U: HCPCS | Performed by: NURSE PRACTITIONER

## 2019-02-06 PROCEDURE — G8598 ASA/ANTIPLAT THER USED: HCPCS | Performed by: NURSE PRACTITIONER

## 2019-02-06 PROCEDURE — 99214 OFFICE O/P EST MOD 30 MIN: CPT | Performed by: NURSE PRACTITIONER

## 2019-02-06 PROCEDURE — 93000 ELECTROCARDIOGRAM COMPLETE: CPT | Performed by: NURSE PRACTITIONER

## 2019-02-06 PROCEDURE — G8427 DOCREV CUR MEDS BY ELIG CLIN: HCPCS | Performed by: NURSE PRACTITIONER

## 2019-02-06 RX ORDER — HYDROCODONE BITARTRATE AND ACETAMINOPHEN 10; 325 MG/1; MG/1
1 TABLET ORAL
COMMUNITY

## 2019-02-06 RX ORDER — NICOTINE POLACRILEX 4 MG/1
GUM, CHEWING ORAL
COMMUNITY

## 2019-02-06 RX ORDER — ATORVASTATIN CALCIUM 40 MG/1
40 TABLET, FILM COATED ORAL DAILY
Qty: 30 TABLET | Refills: 5 | Status: SHIPPED | OUTPATIENT
Start: 2019-02-06 | End: 2019-09-06 | Stop reason: SDUPTHER

## 2019-02-07 ENCOUNTER — TELEPHONE (OUTPATIENT)
Dept: PULMONOLOGY | Age: 44
End: 2019-02-07

## 2019-02-07 ENCOUNTER — TELEPHONE (OUTPATIENT)
Dept: CARDIOLOGY CLINIC | Age: 44
End: 2019-02-07

## 2019-02-08 ENCOUNTER — TELEPHONE (OUTPATIENT)
Dept: CARDIOLOGY CLINIC | Age: 44
End: 2019-02-08

## 2019-02-11 ENCOUNTER — TELEPHONE (OUTPATIENT)
Dept: CARDIOLOGY CLINIC | Age: 44
End: 2019-02-11

## 2019-02-13 ENCOUNTER — TELEPHONE (OUTPATIENT)
Dept: CARDIOLOGY CLINIC | Age: 44
End: 2019-02-13

## 2019-02-20 ENCOUNTER — TELEPHONE (OUTPATIENT)
Dept: CARDIOLOGY CLINIC | Age: 44
End: 2019-02-20

## 2019-02-20 ENCOUNTER — OFFICE VISIT (OUTPATIENT)
Dept: CARDIOLOGY CLINIC | Age: 44
End: 2019-02-20

## 2019-02-20 DIAGNOSIS — I25.10 CORONARY ARTERY DISEASE INVOLVING NATIVE CORONARY ARTERY OF NATIVE HEART WITHOUT ANGINA PECTORIS: Primary | ICD-10-CM

## 2019-02-20 DIAGNOSIS — I10 ESSENTIAL HYPERTENSION: ICD-10-CM

## 2019-02-20 DIAGNOSIS — Z72.0 TOBACCO ABUSE: ICD-10-CM

## 2019-02-20 DIAGNOSIS — E78.00 HYPERCHOLESTEREMIA: ICD-10-CM

## 2019-02-20 NOTE — PATIENT INSTRUCTIONS
~CAD   Date EF Results   Sx      [facility]   []I         []II           []III          []IV   Hx 1/16  SVPCI   [facility] 11/10  12/11  7/13  1/16  4/17     55%  55%  55% PCI of LAD (Jenwendy)  PCI of RCA (Jenike)  Patent stents to RCA and LAD, 70% twig circ (Jenwendy)  PCI of RCA Mati Lepanto)  40% midLAD, 90% midCx, 30% prox RCA Mati Lepanto)   MPI 10/12  5/18   66% Apical scar vs artifact  Small minimally reversible inferoapical wall defect c/w artifact   STTE 4/14 55% Non diagnostic study,   TTE 4/14 55% Mild TR 37   Plan   Continue aggressive medical treatment at doses above     ~HTN  Today BP [x] Controlled [] Borderline [] Uncontrolled   Counseling [x] Diet/Salt [x] Exercise [x] Weight    Plan Continue current medications at doses detailed above  ~Hyperchol  Goal LDL [] <100 [x] <70     Counseling [x] Diet [x] Weight [x] Exercise   Lipid/liver Monitor [x] PCP [] Cardio [] Endocrine   Plan Continue current doses of meds listed above  2/18 HDL:36, LDL:132  ~Tobacco  Status [] Smoker [] Previous smoker   Counseling [x] Risks [x] Cessation   Plan Continued avoidance of first and second hand smoke

## 2019-02-20 NOTE — PROGRESS NOTES
No show. Following historical only. [address]       H+P // CONSULT // OUTPATIENT VISIT // Nhi Flynn     Referring Doctor Alicia Lao MD   Encounter Type      CHIEF COMPLAINT     VisitType [] Acute [] Chronic     Sxs [] None [] CP [] SOB [] Dizzy [] Palps [] Fatigue     Problems CAD, HTN, CHOL, TOB      HISTORY OF PRESENT ILLNESS          Symptom Y N Frequency Duration Severity Modify Assoc Sx Other   CP [] [x]         SOB [] [x]         Dizzy [] [x]         Syncope [] [x]         Palpitations [] [x]           COMPLIANCE     Category Meds Diet Salt Exercise Tobacco Alcohol Drugs   Compliant [x] [x] [x] [x] [x] [x] [x]   [x]Counseling given on all above above categories    HISTORY/ALLERGIES/ROS     MedHx:  has a past medical history of Allergic; Anemia; Asthma; CAD (coronary artery disease); Chronic back pain; Chronic kidney disease; COPD (chronic obstructive pulmonary disease) ([facility] [address] Diabetes mellitus ([facility] [address] GERD (gastroesophageal reflux disease); Hyperlipidemia; Hypertension; Pancreatitis; Pneumonia; Psychiatric problem; and Ulcer. SurgHx:  has a past surgical history that includes Tubal ligation;  section; eye surgery; Cholecystectomy, laparoscopic (10/5/2011); Cardiac catheterization (2010, 2011); and Hysterectomy. SocHx:   reports that she has been smoking. She has a 128.00 pack-year smoking history. She has never used smokeless tobacco. She reports that she does not drink alcohol or use drugs. FamHx: family history includes Heart Disease in her brother, father, mother, and sister; Sleep Apnea in her brother. Allergies: Sulfa antibiotics; Tramadol; Ultracet [tramadol-acetaminophen];  Bactrim; Cephalexin; Ketorolac tromethamine; and Levofloxacin   ROS:  [x]Full ROS obtained and negative except as mentioned in HPI     MEDICATIONS      Current Outpatient Prescriptions   Medication Sig Dispense Refill    omeprazole 20 MG EC tablet Take by mouth 1 tab bid by pcp  HYDROcodone-acetaminophen (NORCO)  MG per tablet Take 1 tablet by mouth. Jeanette [place] atorvastatin (LIPITOR) 40 MG tablet Take 1 tablet by mouth daily 30 tablet 5    mometasone-formoterol (DULERA) 200-5 MCG/ACT inhaler Inhale 2 puffs into the lungs every 12 hours 1 Inhaler 0    albuterol sulfate  (90 Base) MCG/ACT inhaler Inhale 2 puffs into the lungs every 6 hours as needed for Wheezing 1 Inhaler 0    albuterol (PROVENTIL) (2.5 MG/3ML) 0.083% nebulizer solution Take 3 mLs by nebulization every 6 hours as needed for Wheezing Dx: COPD   ICD-10: J44.9 120 each 0    Respiratory Therapy Supplies (NEBULIZER/TUBING/MOUTHPIECE) KIT 1 kit by Does not apply route daily as needed (wheezing) Dx: COPD   ICD-10: J44.9 1 kit 1    Nebulizers (COMPRESSOR/NEBULIZER) MISC To be used with albuterol 0.083% - Dx: COPD   ICD-10: J44.9 1 each 0    clopidogrel (PLAVIX) 75 MG tablet Take 1 tablet by mouth daily 30 tablet 5    lisinopril (PRINIVIL;ZESTRIL) 10 MG tablet Take 1 tablet by mouth daily 30 tablet 6    famotidine (PEPCID) 20 MG tablet Take 1 tablet by mouth daily 60 tablet 0    hydrochlorothiazide (HYDRODIURIL) 25 MG tablet Take 1 tablet by mouth daily (Patient taking differently: Take 25 mg by mouth PRN) 30 tablet 6    pantoprazole (PROTONIX) 40 MG tablet Take 1 tablet by mouth daily 30 tablet 5    nitroGLYCERIN (NITROSTAT) 0.4 MG SL tablet Place 1 tablet under the tongue every 5 minutes as needed for Chest pain 25 tablet 1    aspirin 81 MG chewable tablet NOT TAKING DUE TO STOMACH ISSUES 30 tablet 3     Current Facility-Administered Medications   Medication Dose Route Frequency Provider Last Rate Last Dose    ticagrelor (BRILINTA) tablet 90 mg  90 mg Oral BID Elizabeth Rossi MD         Reviewed with patient and will remain unchanged except as mentioned in A/P  PHYSICAL EXAM   There were no vitals filed for this visit.      ASSESSMENT AND PLAN   ~CAD   Date EF Results   Sx      [facility]   []I         []II []III          []IV   Hx 1/16  SVPCI   [facility] 11/10  12/11  7/13  1/16  4/17     55%  55%  55% PCI of LAD (Jenike)  PCI of RCA (Jenike)  Patent stents to RCA and LAD, 70% twig circ (Jenike)  PCI of RCA [facility])  40% midLAD, 90% midCx, 30% prox RCA [facility])   MPI 10/12  5/18   66% Apical scar vs artifact  Small minimally reversible inferoapical wall defect c/w artifact   STTE 4/14 55% Non diagnostic study,   TTE 4/14 55% Mild TR 37   Plan   Continue aggressive medical treatment at doses above     ~HTN  Today BP [x] Controlled [] Borderline [] Uncontrolled   Counseling [x] Diet/Salt [x] Exercise [x] Weight    Plan Continue current medications at doses detailed above  ~Hyperchol  Goal LDL [] <100 [x] <70     Counseling [x] Diet [x] Weight [x] Exercise   Lipid/liver Monitor [x] PCP [] Cardio [] Endocrine   Plan Continue current doses of meds listed above  2/18 HDL:36, LDL:132  ~Tobacco  Status [] Smoker [] Previous smoker   Counseling [x] Risks [x] Cessation   Plan Continued avoidance of first and second hand smoke

## 2019-03-13 ENCOUNTER — TELEPHONE (OUTPATIENT)
Dept: CARDIOLOGY CLINIC | Age: 44
End: 2019-03-13

## 2019-03-20 ENCOUNTER — TELEPHONE (OUTPATIENT)
Dept: CARDIOLOGY CLINIC | Age: 44
End: 2019-03-20

## 2019-03-22 ENCOUNTER — OFFICE VISIT (OUTPATIENT)
Dept: CARDIOLOGY CLINIC | Age: 44
End: 2019-03-22
Payer: COMMERCIAL

## 2019-03-22 VITALS
HEART RATE: 76 BPM | BODY MASS INDEX: 43.6 KG/M2 | SYSTOLIC BLOOD PRESSURE: 130 MMHG | WEIGHT: 236.9 LBS | OXYGEN SATURATION: 91 % | DIASTOLIC BLOOD PRESSURE: 70 MMHG | HEIGHT: 62 IN

## 2019-03-22 DIAGNOSIS — E66.01 MORBID OBESITY, UNSPECIFIED OBESITY TYPE (HCC): ICD-10-CM

## 2019-03-22 DIAGNOSIS — R00.2 PALPITATION: ICD-10-CM

## 2019-03-22 DIAGNOSIS — R60.0 LOCALIZED EDEMA: ICD-10-CM

## 2019-03-22 DIAGNOSIS — R06.02 SOB (SHORTNESS OF BREATH): ICD-10-CM

## 2019-03-22 DIAGNOSIS — Z72.0 TOBACCO ABUSE: Primary | ICD-10-CM

## 2019-03-22 DIAGNOSIS — R09.89 BRUIT OF RIGHT CAROTID ARTERY: ICD-10-CM

## 2019-03-22 DIAGNOSIS — E78.00 HYPERCHOLESTEREMIA: ICD-10-CM

## 2019-03-22 PROCEDURE — G8417 CALC BMI ABV UP PARAM F/U: HCPCS | Performed by: NURSE PRACTITIONER

## 2019-03-22 PROCEDURE — 99214 OFFICE O/P EST MOD 30 MIN: CPT | Performed by: NURSE PRACTITIONER

## 2019-03-22 PROCEDURE — G8598 ASA/ANTIPLAT THER USED: HCPCS | Performed by: NURSE PRACTITIONER

## 2019-03-22 PROCEDURE — G8484 FLU IMMUNIZE NO ADMIN: HCPCS | Performed by: NURSE PRACTITIONER

## 2019-03-22 PROCEDURE — 93268 ECG RECORD/REVIEW: CPT | Performed by: INTERNAL MEDICINE

## 2019-03-22 PROCEDURE — 4004F PT TOBACCO SCREEN RCVD TLK: CPT | Performed by: NURSE PRACTITIONER

## 2019-03-22 PROCEDURE — G8427 DOCREV CUR MEDS BY ELIG CLIN: HCPCS | Performed by: NURSE PRACTITIONER

## 2019-03-22 RX ORDER — FAMOTIDINE 20 MG/1
20 TABLET, FILM COATED ORAL DAILY
Qty: 30 TABLET | Refills: 0 | Status: SHIPPED | OUTPATIENT
Start: 2019-03-22 | End: 2020-03-11

## 2019-03-22 RX ORDER — FUROSEMIDE 20 MG/1
20 TABLET ORAL DAILY
Qty: 30 TABLET | Refills: 1 | Status: SHIPPED | OUTPATIENT
Start: 2019-03-22 | End: 2019-09-18 | Stop reason: SDUPTHER

## 2019-06-13 ENCOUNTER — TELEPHONE (OUTPATIENT)
Dept: PULMONOLOGY | Age: 44
End: 2019-06-13

## 2019-06-13 NOTE — TELEPHONE ENCOUNTER
Suyapa with [employer] verification called in stating that Pt's CT Chest requires a peer to peer.     Reference # [id number]    # [phone]

## 2019-06-17 NOTE — TELEPHONE ENCOUNTER
Suyapa called in this morning regarding peer to peer, stated it had not been done and Pt is scheduled for CT Chest 6/18.

## 2019-06-17 NOTE — TELEPHONE ENCOUNTER
lmz on vm to have the pt cancel her CT Chest due to the insurance co not covering the test - pt was asked to call and schedule an appt w/Joie in regards to her cough / SOB

## 2019-07-23 ENCOUNTER — HOSPITAL ENCOUNTER (OUTPATIENT)
Dept: VASCULAR LAB | Age: 44
Discharge: HOME | End: 2019-07-23
Payer: COMMERCIAL

## 2019-07-23 DIAGNOSIS — R09.89 BRUIT OF RIGHT CAROTID ARTERY: ICD-10-CM

## 2019-09-06 ENCOUNTER — TELEPHONE (OUTPATIENT)
Dept: CARDIOLOGY CLINIC | Age: 44
End: 2019-09-06

## 2019-09-06 RX ORDER — CLOPIDOGREL BISULFATE 75 MG/1
75 TABLET ORAL DAILY
Qty: 30 TABLET | Refills: 0 | Status: SHIPPED | OUTPATIENT
Start: 2019-09-06 | End: 2019-10-09 | Stop reason: SDUPTHER

## 2019-09-06 RX ORDER — NITROGLYCERIN 0.4 MG/1
0.4 TABLET SUBLINGUAL EVERY 5 MIN PRN
Qty: 25 TABLET | Refills: 1 | Status: SHIPPED | OUTPATIENT
Start: 2019-09-06 | End: 2021-03-10 | Stop reason: SDUPTHER

## 2019-09-06 RX ORDER — LISINOPRIL 10 MG/1
10 TABLET ORAL DAILY
Qty: 30 TABLET | Refills: 0 | Status: SHIPPED | OUTPATIENT
Start: 2019-09-06 | End: 2019-10-09 | Stop reason: SDUPTHER

## 2019-09-06 RX ORDER — ATORVASTATIN CALCIUM 40 MG/1
40 TABLET, FILM COATED ORAL DAILY
Qty: 30 TABLET | Refills: 0 | Status: SHIPPED | OUTPATIENT
Start: 2019-09-06 | End: 2019-10-09 | Stop reason: SDUPTHER

## 2019-09-11 ENCOUNTER — OFFICE VISIT (OUTPATIENT)
Dept: PULMONOLOGY | Age: 44
End: 2019-09-11
Payer: COMMERCIAL

## 2019-09-11 VITALS
OXYGEN SATURATION: 93 % | SYSTOLIC BLOOD PRESSURE: 138 MMHG | DIASTOLIC BLOOD PRESSURE: 72 MMHG | BODY MASS INDEX: 45.27 KG/M2 | HEIGHT: 62 IN | WEIGHT: 246 LBS | RESPIRATION RATE: 24 BRPM | HEART RATE: 88 BPM

## 2019-09-11 DIAGNOSIS — J44.9 COPD, SEVERITY TO BE DETERMINED (HCC): ICD-10-CM

## 2019-09-11 DIAGNOSIS — R05.9 COUGH: Primary | ICD-10-CM

## 2019-09-11 DIAGNOSIS — Z72.0 TOBACCO ABUSE: ICD-10-CM

## 2019-09-11 PROCEDURE — G8598 ASA/ANTIPLAT THER USED: HCPCS | Performed by: NURSE PRACTITIONER

## 2019-09-11 PROCEDURE — 4004F PT TOBACCO SCREEN RCVD TLK: CPT | Performed by: NURSE PRACTITIONER

## 2019-09-11 PROCEDURE — G8926 SPIRO NO PERF OR DOC: HCPCS | Performed by: NURSE PRACTITIONER

## 2019-09-11 PROCEDURE — 99214 OFFICE O/P EST MOD 30 MIN: CPT | Performed by: NURSE PRACTITIONER

## 2019-09-11 PROCEDURE — G8428 CUR MEDS NOT DOCUMENT: HCPCS | Performed by: NURSE PRACTITIONER

## 2019-09-11 PROCEDURE — G8417 CALC BMI ABV UP PARAM F/U: HCPCS | Performed by: NURSE PRACTITIONER

## 2019-09-11 PROCEDURE — 3023F SPIROM DOC REV: CPT | Performed by: NURSE PRACTITIONER

## 2019-09-11 RX ORDER — ALBUTEROL SULFATE 90 UG/1
2 AEROSOL, METERED RESPIRATORY (INHALATION) EVERY 4 HOURS PRN
Qty: 1 INHALER | Refills: 3 | Status: SHIPPED | OUTPATIENT
Start: 2019-09-11

## 2019-09-11 ASSESSMENT — ENCOUNTER SYMPTOMS
ABDOMINAL PAIN: 0
COLOR CHANGE: 0
COUGH: 1
CONSTIPATION: 0
SHORTNESS OF BREATH: 1

## 2019-09-18 ENCOUNTER — OFFICE VISIT (OUTPATIENT)
Dept: CARDIOLOGY CLINIC | Age: 44
End: 2019-09-18
Payer: COMMERCIAL

## 2019-09-18 VITALS
HEIGHT: 62 IN | WEIGHT: 244 LBS | DIASTOLIC BLOOD PRESSURE: 80 MMHG | HEART RATE: 72 BPM | SYSTOLIC BLOOD PRESSURE: 130 MMHG | BODY MASS INDEX: 44.9 KG/M2 | OXYGEN SATURATION: 93 %

## 2019-09-18 DIAGNOSIS — I10 ESSENTIAL HYPERTENSION: ICD-10-CM

## 2019-09-18 DIAGNOSIS — R60.0 LOCALIZED EDEMA: ICD-10-CM

## 2019-09-18 DIAGNOSIS — E66.01 MORBID OBESITY, UNSPECIFIED OBESITY TYPE (HCC): Primary | ICD-10-CM

## 2019-09-18 DIAGNOSIS — I25.10 CORONARY ARTERY DISEASE INVOLVING NATIVE CORONARY ARTERY OF NATIVE HEART WITHOUT ANGINA PECTORIS: ICD-10-CM

## 2019-09-18 PROCEDURE — 4004F PT TOBACCO SCREEN RCVD TLK: CPT | Performed by: NURSE PRACTITIONER

## 2019-09-18 PROCEDURE — 99214 OFFICE O/P EST MOD 30 MIN: CPT | Performed by: NURSE PRACTITIONER

## 2019-09-18 PROCEDURE — G8598 ASA/ANTIPLAT THER USED: HCPCS | Performed by: NURSE PRACTITIONER

## 2019-09-18 PROCEDURE — G8427 DOCREV CUR MEDS BY ELIG CLIN: HCPCS | Performed by: NURSE PRACTITIONER

## 2019-09-18 PROCEDURE — G8417 CALC BMI ABV UP PARAM F/U: HCPCS | Performed by: NURSE PRACTITIONER

## 2019-09-18 RX ORDER — FUROSEMIDE 20 MG/1
20 TABLET ORAL 2 TIMES DAILY
Qty: 60 TABLET | Refills: 3 | Status: SHIPPED | OUTPATIENT
Start: 2019-09-18 | End: 2020-05-04 | Stop reason: SDUPTHER

## 2019-09-18 NOTE — PROGRESS NOTES
[address]     Outpatient Follow Up Note    CHIEF COMPLAINT / HPI       Heidi Campos is 38 y.o. female who presents today for a FU visit. She is under \"major\" stress in her life, and ran out of her pills few weeks ago. .  Subjective:    She continues to smoke 2PPD. She states her feet are swollen at times. .  Compliance has been an issue. She did not tolerate ranexa or imdur. She did have surgery on her throat for polyps in throat and due to get sleep apnea addressed when this is healed. She only takes rare NTG. She did wear HM and reviewed monitor today.   She does not exercise on a regular basis    Past Medical History:   Diagnosis Date    Allergic     Anemia     Asthma     CAD (coronary artery disease)     Stent LAD    Chronic back pain     back    Chronic kidney disease     kidney stones    COPD (chronic obstructive pulmonary disease) (HCC)     Diabetes mellitus ([address]     gestional diabetes only    GERD (gastroesophageal reflux disease)     Hyperlipidemia     Hypertension     Pancreatitis 10/2011    Pneumonia 2008    Psychiatric problem     anxiewty, depression    Ulcer     stomach     Social History:    Social History     Tobacco Use   Smoking Status Current Every Day Smoker    Packs/day: 4.00    Years: 32.00    Pack years: 128.00   Smokeless Tobacco Never Used   Tobacco Comment    started smoking at age 14 / smoked up to 4 p.p.d / now smoking 2 to 3 p.p.d     Current Medications:  Current Outpatient Medications   Medication Sig Dispense Refill    mometasone-formoterol (DULERA) 200-5 MCG/ACT inhaler Inhale 2 puffs into the lungs every 12 hours Take 2 puffs 2 times daily 1 Inhaler 3    albuterol sulfate HFA (PROVENTIL HFA) 108 (90 Base) MCG/ACT inhaler Inhale 2 puffs into the lungs every 4 hours as needed for Wheezing 1 Inhaler 3    clopidogrel (PLAVIX) 75 MG tablet Take 1 tablet by mouth daily 30 tablet 0    lisinopril (PRINIVIL;ZESTRIL) 10 MG tablet Take 1

## 2019-10-10 RX ORDER — LISINOPRIL 10 MG/1
10 TABLET ORAL DAILY
Qty: 90 TABLET | Refills: 3 | Status: SHIPPED | OUTPATIENT
Start: 2019-10-10 | End: 2019-10-31 | Stop reason: SDUPTHER

## 2019-10-10 RX ORDER — ATORVASTATIN CALCIUM 40 MG/1
40 TABLET, FILM COATED ORAL DAILY
Qty: 90 TABLET | Refills: 3 | Status: SHIPPED | OUTPATIENT
Start: 2019-10-10 | End: 2019-10-31 | Stop reason: SDUPTHER

## 2019-10-10 RX ORDER — CLOPIDOGREL BISULFATE 75 MG/1
75 TABLET ORAL DAILY
Qty: 90 TABLET | Refills: 3 | Status: SHIPPED | OUTPATIENT
Start: 2019-10-10 | End: 2019-10-31 | Stop reason: SDUPTHER

## 2019-10-31 ENCOUNTER — HOSPITAL ENCOUNTER (OUTPATIENT)
Age: 44
Discharge: HOME | End: 2019-10-31
Payer: COMMERCIAL

## 2019-10-31 ENCOUNTER — HOSPITAL ENCOUNTER (OUTPATIENT)
Dept: GENERAL RADIOLOGY | Age: 44
Discharge: HOME | End: 2019-10-31
Payer: COMMERCIAL

## 2019-10-31 DIAGNOSIS — R05.9 COUGH: ICD-10-CM

## 2019-10-31 DIAGNOSIS — E78.00 HYPERCHOLESTEREMIA: ICD-10-CM

## 2019-10-31 DIAGNOSIS — R06.02 SOB (SHORTNESS OF BREATH): ICD-10-CM

## 2019-10-31 LAB
ALT SERPL-CCNC: 31 U/L (ref 10–40)
ANION GAP SERPL CALCULATED.3IONS-SCNC: 17 MMOL/L (ref 3–16)
AST SERPL-CCNC: 24 U/L (ref 15–37)
BUN BLDV-MCNC: 16 MG/DL (ref 7–20)
CALCIUM SERPL-MCNC: 9.3 MG/DL (ref 8.3–10.6)
CHLORIDE BLD-SCNC: 96 MMOL/L (ref 99–110)
CHOLESTEROL, TOTAL: 194 MG/DL (ref 0–199)
CO2: 24 MMOL/L (ref 21–32)
CREAT SERPL-MCNC: 0.6 MG/DL (ref 0.6–1.1)
GFR AFRICAN AMERICAN: >60
GFR NON-AFRICAN AMERICAN: >60
GLUCOSE BLD-MCNC: 115 MG/DL (ref 70–99)
HCT VFR BLD CALC: 48.5 % (ref 36–48)
HDLC SERPL-MCNC: 35 MG/DL (ref 40–60)
HEMOGLOBIN: 16 G/DL (ref 12–16)
LDL CHOLESTEROL CALCULATED: 126 MG/DL
MCH RBC QN AUTO: 29.1 PG (ref 26–34)
MCHC RBC AUTO-ENTMCNC: 33 G/DL (ref 31–36)
MCV RBC AUTO: 88.2 FL (ref 80–100)
PDW BLD-RTO: 15.8 % (ref 12.4–15.4)
PLATELET # BLD: 180 K/UL (ref 135–450)
PMV BLD AUTO: 9.2 FL (ref 5–10.5)
POTASSIUM SERPL-SCNC: 4.3 MMOL/L (ref 3.5–5.1)
RBC # BLD: 5.5 M/UL (ref 4–5.2)
SODIUM BLD-SCNC: 137 MMOL/L (ref 136–145)
TRIGL SERPL-MCNC: 165 MG/DL (ref 0–150)
VLDLC SERPL CALC-MCNC: 33 MG/DL
WBC # BLD: 10.9 K/UL (ref 4–11)

## 2019-10-31 PROCEDURE — 85027 COMPLETE CBC AUTOMATED: CPT

## 2019-10-31 PROCEDURE — 36415 COLL VENOUS BLD VENIPUNCTURE: CPT

## 2019-10-31 PROCEDURE — 84460 ALANINE AMINO (ALT) (SGPT): CPT

## 2019-10-31 PROCEDURE — 84450 TRANSFERASE (AST) (SGOT): CPT

## 2019-10-31 PROCEDURE — 71046 X-RAY EXAM CHEST 2 VIEWS: CPT

## 2019-10-31 PROCEDURE — 80048 BASIC METABOLIC PNL TOTAL CA: CPT

## 2019-10-31 PROCEDURE — 80061 LIPID PANEL: CPT

## 2019-10-31 RX ORDER — ATORVASTATIN CALCIUM 40 MG/1
40 TABLET, FILM COATED ORAL DAILY
Qty: 90 TABLET | Refills: 3 | Status: SHIPPED | OUTPATIENT
Start: 2019-10-31 | End: 2020-11-17

## 2019-10-31 RX ORDER — LISINOPRIL 10 MG/1
10 TABLET ORAL DAILY
Qty: 90 TABLET | Refills: 3 | Status: SHIPPED | OUTPATIENT
Start: 2019-10-31 | End: 2020-11-02

## 2019-10-31 RX ORDER — CLOPIDOGREL BISULFATE 75 MG/1
75 TABLET ORAL DAILY
Qty: 90 TABLET | Refills: 3 | Status: SHIPPED | OUTPATIENT
Start: 2019-10-31 | End: 2020-11-02

## 2019-11-01 ENCOUNTER — TELEPHONE (OUTPATIENT)
Dept: CARDIOLOGY CLINIC | Age: 44
End: 2019-11-01

## 2019-11-01 ENCOUNTER — TELEPHONE (OUTPATIENT)
Dept: PULMONOLOGY | Age: 44
End: 2019-11-01

## 2019-11-01 DIAGNOSIS — R93.89 ABNORMAL CXR: Primary | ICD-10-CM

## 2019-11-11 ENCOUNTER — HOSPITAL ENCOUNTER (OUTPATIENT)
Dept: CT IMAGING | Age: 44
Discharge: HOME | End: 2019-11-11
Payer: COMMERCIAL

## 2019-11-11 DIAGNOSIS — R06.02 SOB (SHORTNESS OF BREATH): ICD-10-CM

## 2019-11-11 PROCEDURE — 71250 CT THORAX DX C-: CPT

## 2019-11-12 ENCOUNTER — TELEPHONE (OUTPATIENT)
Dept: PULMONOLOGY | Age: 44
End: 2019-11-12

## 2019-11-26 ENCOUNTER — TELEPHONE (OUTPATIENT)
Dept: PULMONOLOGY | Age: 44
End: 2019-11-26

## 2020-03-09 NOTE — PROGRESS NOTES
mentioned in A/P  PHYSICAL EXAM     Vitals:    03/11/20 1043   BP: 122/62   Pulse: 77   SpO2: 94%      Gen Alert, coop, no distress Heart  Rrr, no mrg   Head NC, AT, no abnorm Abd  Soft, NT, +BS, no mass, no OM   Eyes PER, conj/corn clear Ext  Ext nl, AT, no C/C/E   Nose Nares nl, no drain, NT Pulse 2+ and symmetric   Throat Lips, mucosa, tongue nl Skin Col/text/turg nl, no vis rash/les   Neck S/S, TM, NT, no bruit/JVD Psych Nl mood and affect   Lung CTA-B, unlabored, no DTP Lymph   No cervical or axillary LA   Ch wall NT, no deform Neuro  Nl gross M/S exam     ASSESSMENT AND PLAN     ~CAD   Date EF Results   Sx   Typical, unstable angina   Hx 1/16  SVPCI   [facility] 11/10  12/11  7/13  1/16  4/17     55%  55%  55% PCI of LAD (Jenwendy)  PCI of RCA (Jenwendy)  Patent stents to RCA and LAD, 70% twig circ (Vish)  PCI of RCA (Pepe)  40% midLAD, 90% midCx, 30% prox RCA Rob Wu)   MPI 10/12  5/18   66% Apical scar vs artifact  Small minimally reversible inferoapical wall defect c/w artifact   STTE 4/14 55% Non diagnostic study   TTE 4/14 55% Mild TR 37   Plan   [facility], R/B/O discussed in detail.    ~HTN  Today BP Controlled   Counseling Counseled on diet/salt, exercise and ideal body weight   Plan Continue current meds at doses above   ~Hyperchol  Goal LDL <70   Counseling Counseled on diet, exercise and ideal body weight   Plan PCP liver/lipid surveillance, continue current meds at doses above  Encouraged compliance with statin   10/19 HDL:35, LDL:126  ~Tobacco  Status Smoking 3ppd   Counseling Counseled on risks, cessation therapies discussed   Plan Continued avoidance of first and second hand smoke   ~Compliance  Discussed importance of compliance with meds/diet/salt/exercise; avoidance of tobacco/alcohol/drugs  Plan Patient expressed understanding  ~Followup  Interval:  After [facility]    Scribe Attestation  I, Andrade Simpson, am scribing for and in the presence of Jose Juan Anderson MD.   Signed, Andrade Simpson 03/09/20 1:15 PM Provider Dylan Gonzalez is working as a scribe for and in the presence of alin Ryan MD). Working as a scribe, Shira Logan may have prepopulated components of this note with my historical  intellectual property under my direct supervision. Any additions to this intellectual property were performed in my presence and at my direction.   Furthermore, the content and accuracy of this note have been reviewed by alin Ryan MD).  3/11/2020 10:49 AM

## 2020-03-11 ENCOUNTER — OFFICE VISIT (OUTPATIENT)
Dept: CARDIOLOGY CLINIC | Age: 45
End: 2020-03-11
Payer: COMMERCIAL

## 2020-03-11 VITALS
SYSTOLIC BLOOD PRESSURE: 122 MMHG | DIASTOLIC BLOOD PRESSURE: 62 MMHG | BODY MASS INDEX: 46.63 KG/M2 | OXYGEN SATURATION: 94 % | HEART RATE: 77 BPM | WEIGHT: 247 LBS | HEIGHT: 61 IN

## 2020-03-11 PROCEDURE — G8484 FLU IMMUNIZE NO ADMIN: HCPCS | Performed by: INTERNAL MEDICINE

## 2020-03-11 PROCEDURE — G8427 DOCREV CUR MEDS BY ELIG CLIN: HCPCS | Performed by: INTERNAL MEDICINE

## 2020-03-11 PROCEDURE — G8417 CALC BMI ABV UP PARAM F/U: HCPCS | Performed by: INTERNAL MEDICINE

## 2020-03-11 PROCEDURE — 99214 OFFICE O/P EST MOD 30 MIN: CPT | Performed by: INTERNAL MEDICINE

## 2020-03-11 PROCEDURE — 4004F PT TOBACCO SCREEN RCVD TLK: CPT | Performed by: INTERNAL MEDICINE

## 2020-03-11 NOTE — LETTER
Counseling Counseled on risks, cessation therapies discussed   Plan Continued avoidance of first and second hand smoke   ~Compliance  Discussed importance of compliance with meds/diet/salt/exercise; avoidance of tobacco/alcohol/drugs  Plan Patient expressed understanding  ~Followup  Interval:  After [facility]    Scribe Attestation  Angelique OMALLEY, am scribing for and in the presence of Maryan Davison MD.   SignedAngelique 03/09/20 1:15 PM   Provider Cali Garcia is working as a scribe for and in the presence of me (Maryan Davison MD). Working as a scribe, Angelique Hopkins may have prepopulated components of this note with my historical  intellectual property under my direct supervision. Any additions to this intellectual property were performed in my presence and at my direction. Furthermore, the content and accuracy of this note have been reviewed by me Maryan Davison MD).  3/11/2020 10:49 AM            If you have questions, please do not hesitate to call me. I look forward to following [facility] along with you.     Sincerely,        Benton Chapman MD

## 2020-03-13 ENCOUNTER — HOSPITAL ENCOUNTER (OUTPATIENT)
Dept: CARDIAC CATH/INVASIVE PROCEDURES | Age: 45
Discharge: HOME | End: 2020-03-13
Attending: INTERNAL MEDICINE | Admitting: INTERNAL MEDICINE
Payer: COMMERCIAL

## 2020-03-13 VITALS — BODY MASS INDEX: 46.63 KG/M2 | HEIGHT: 61 IN | WEIGHT: 247 LBS

## 2020-03-13 LAB
ANION GAP SERPL CALCULATED.3IONS-SCNC: 11 MMOL/L (ref 3–16)
BUN BLDV-MCNC: 18 MG/DL (ref 7–20)
CALCIUM SERPL-MCNC: 9.6 MG/DL (ref 8.3–10.6)
CHLORIDE BLD-SCNC: 101 MMOL/L (ref 99–110)
CO2: 27 MMOL/L (ref 21–32)
CREAT SERPL-MCNC: 0.6 MG/DL (ref 0.6–1.1)
EKG ATRIAL RATE: 81 BPM
EKG DIAGNOSIS: NORMAL
EKG P AXIS: 39 DEGREES
EKG P-R INTERVAL: 134 MS
EKG Q-T INTERVAL: 388 MS
EKG QRS DURATION: 82 MS
EKG QTC CALCULATION (BAZETT): 450 MS
EKG R AXIS: 76 DEGREES
EKG T AXIS: 97 DEGREES
EKG VENTRICULAR RATE: 81 BPM
GFR AFRICAN AMERICAN: >60
GFR NON-AFRICAN AMERICAN: >60
GLUCOSE BLD-MCNC: 149 MG/DL (ref 70–99)
HCT VFR BLD CALC: 51.7 % (ref 36–48)
HEMOGLOBIN: 16.6 G/DL (ref 12–16)
LEFT VENTRICULAR EJECTION FRACTION MODE: NORMAL
LV EF: 60 %
MCH RBC QN AUTO: 27.1 PG (ref 26–34)
MCHC RBC AUTO-ENTMCNC: 32 G/DL (ref 31–36)
MCV RBC AUTO: 84.7 FL (ref 80–100)
PDW BLD-RTO: 17.1 % (ref 12.4–15.4)
PLATELET # BLD: 187 K/UL (ref 135–450)
PMV BLD AUTO: 9 FL (ref 5–10.5)
POC ACT LR: 192 SEC
POC ACT LR: 254 SEC
POC ACT LR: >400 SEC
POTASSIUM SERPL-SCNC: 4.4 MMOL/L (ref 3.5–5.1)
RBC # BLD: 6.1 M/UL (ref 4–5.2)
SODIUM BLD-SCNC: 139 MMOL/L (ref 136–145)
WBC # BLD: 12.3 K/UL (ref 4–11)

## 2020-03-13 PROCEDURE — 99153 MOD SED SAME PHYS/QHP EA: CPT

## 2020-03-13 PROCEDURE — 92928 PRQ TCAT PLMT NTRAC ST 1 LES: CPT | Performed by: INTERNAL MEDICINE

## 2020-03-13 PROCEDURE — 93458 L HRT ARTERY/VENTRICLE ANGIO: CPT

## 2020-03-13 PROCEDURE — C1887 CATHETER, GUIDING: HCPCS

## 2020-03-13 PROCEDURE — C1874 STENT, COATED/COV W/DEL SYS: HCPCS

## 2020-03-13 PROCEDURE — 92978 ENDOLUMINL IVUS OCT C 1ST: CPT | Performed by: INTERNAL MEDICINE

## 2020-03-13 PROCEDURE — 99152 MOD SED SAME PHYS/QHP 5/>YRS: CPT

## 2020-03-13 PROCEDURE — 93005 ELECTROCARDIOGRAM TRACING: CPT

## 2020-03-13 PROCEDURE — 99152 MOD SED SAME PHYS/QHP 5/>YRS: CPT | Performed by: INTERNAL MEDICINE

## 2020-03-13 PROCEDURE — C1769 GUIDE WIRE: HCPCS

## 2020-03-13 PROCEDURE — 80048 BASIC METABOLIC PNL TOTAL CA: CPT

## 2020-03-13 PROCEDURE — 92978 ENDOLUMINL IVUS OCT C 1ST: CPT

## 2020-03-13 PROCEDURE — 85027 COMPLETE CBC AUTOMATED: CPT

## 2020-03-13 PROCEDURE — C1725 CATH, TRANSLUMIN NON-LASER: HCPCS

## 2020-03-13 PROCEDURE — C1894 INTRO/SHEATH, NON-LASER: HCPCS

## 2020-03-13 PROCEDURE — 6360000004 HC RX CONTRAST MEDICATION: Performed by: INTERNAL MEDICINE

## 2020-03-13 PROCEDURE — 2709999900 HC NON-CHARGEABLE SUPPLY

## 2020-03-13 PROCEDURE — 93458 L HRT ARTERY/VENTRICLE ANGIO: CPT | Performed by: INTERNAL MEDICINE

## 2020-03-13 PROCEDURE — C1753 CATH, INTRAVAS ULTRASOUND: HCPCS

## 2020-03-13 PROCEDURE — 92928 PRQ TCAT PLMT NTRAC ST 1 LES: CPT

## 2020-03-13 PROCEDURE — 36415 COLL VENOUS BLD VENIPUNCTURE: CPT

## 2020-03-13 PROCEDURE — 85347 COAGULATION TIME ACTIVATED: CPT

## 2020-03-13 RX ADMIN — IOPAMIDOL 146 ML: 755 INJECTION, SOLUTION INTRAVENOUS at 13:31

## 2020-03-13 NOTE — PRE SEDATION
Brief Pre-Op Note/Sedation Assessment      [facility]  1975  Cath/NONE      [id number]  11:07 AM    Planned Procedure: Cardiac Catheterization Procedure    Post Procedure Plan: Return to same level of care    Consent: I have discussed with the patient and/or the patient representative the indication, alternatives, and the possible risks and/or complications of the planned procedure and the anesthesia methods. The patient and/or patient representative appear to understand and agree to proceed. Chief Complaint: Chest Pain/Pressure  Anginal Equivalent  Dyspnea on Exertion  Dyspnea      Indications for Cath Procedure:  New Onset Angina <= 2 months and Worsening Angina  Anginal Classification within 2 weeks:  CCS III - Symptoms with everyday living activities, i.e., moderate limitation  NYHA Heart Failure Class within 2 weeks: No symptoms  Is Cath Lab Visit Valve-related?: No    Anti- Anginal Meds within 2 weeks:   Yes: Statin (Any)    Stress or Imaging Studies Performed:  None     Vital Signs:  Ht 5' 1\" (1.549 m)   Wt 247 lb (112 kg)   LMP 10/15/2012   BMI 46.67 kg/m²     Allergies:   Allergies   Allergen Reactions    Sulfa Antibiotics Shortness Of Breath    Tramadol      Breathing difficulties    Ultracet [Tramadol-Acetaminophen]      Rash      Bactrim Rash    Cephalexin Rash    Ketorolac Tromethamine Rash    Levofloxacin Rash       Past Medical History:  Past Medical History:   Diagnosis Date    Allergic     Anemia     Asthma     CAD (coronary artery disease)     Stent LAD    Chronic back pain     back    Chronic kidney disease     kidney stones    [facility]) [facility]     Diabetes mellitus ([facility]     gestional diabetes only    GERD (gastroesophageal reflux disease)     Hyperlipidemia     Hypertension     Pancreatitis 10/2011    Pneumonia 2008    Psychiatric problem     anxiewty, depression    Ulcer     stomach         Surgical History:  Past

## 2020-03-13 NOTE — OP NOTE
[address]   Procedure Note      Procedure: [facility], PCI of RCA  Indication: Unstable angina  Consent: Verbal and/or written consent obtained prior to procedure. Sedation: Minimal conscious sedation utilized for comfort. Complic: None  EBL:  <20FT  Specimens: None  Fluoro:  15.9 min  Contrast: 146 cc  Access:  RRA  Ultrasound: Ultrasound guidance used to determine aforementioned artery patency, size (>2mm), anatomic variations and ideal puncture location. Real-time ultrasound utilized concurrent with vascular needle entry into the artery. Image(s) permanently recorded and reported in the patient chart. Findings:   LM  20% distal  LAD  30% prox, patent stent with 20% instent  CX  90% Cx but very small vessel and chronic  RCA  80% ulcerated distal bifurcation, hazy  LVG  60%  LVEDP  5    Intervention:   PCI of distal RCA into PLB with 4.7D35ZOR (all PD with 4. [address])  Mid stents malapposed and postdilated with 4. 0NC throughout  IVUS of RCA for stent sizing and bifurcation characterization    Post Cath Dx:   Disease as above    Med Rec:   Recommendation Indicated?  Not Given Due To: Note   DAPT  yes     STATIN - HIGH DOSE yes  Lipitor >40mg, Crestor >20mg   BETA BLOCKER yes Low BP    ACE/ARB/ARNI yes     ALDACTONE no       Non-Med Rec:   Category Recommendation   EF ASSESSMENT Noninvasive assessment of EF if LVG deferred as IP/OP as appropriate   TOBACCO Avoidance of first and second hand smoke   DIET/EXERCISE Maintain healthy lifestyle with focus on diet, exercise and weight loss

## 2020-04-22 NOTE — PROGRESS NOTES
(PRINIVIL;ZESTRIL) 10 MG tablet Take 1 tablet by mouth daily 90 tablet 3    clopidogrel (PLAVIX) 75 MG tablet Take 1 tablet by mouth daily 90 tablet 3    atorvastatin (LIPITOR) 40 MG tablet Take 1 tablet by mouth daily 90 tablet 3    furosemide (LASIX) 20 MG tablet Take 1 tablet by mouth 2 times daily 60 tablet 3    albuterol sulfate HFA (PROVENTIL HFA) 108 (90 Base) MCG/ACT inhaler Inhale 2 puffs into the lungs every 4 hours as needed for Wheezing 1 Inhaler 3    nitroGLYCERIN (NITROSTAT) 0.4 MG SL tablet Place 1 tablet under the tongue every 5 minutes as needed for Chest pain 25 tablet 1    omeprazole 20 MG EC tablet Take by mouth 1 tab bid by pcp      HYDROcodone-acetaminophen (NORCO)  MG per tablet Take 1 tablet by mouth. .      albuterol sulfate  (90 Base) MCG/ACT inhaler Inhale 2 puffs into the lungs every 6 hours as needed for Wheezing 1 Inhaler 0    albuterol (PROVENTIL) (2.5 MG/3ML) 0.083% nebulizer solution Take 3 mLs by nebulization every 6 hours as needed for Wheezing Dx: COPD   ICD-10: J44.9 120 each 0    Respiratory Therapy Supplies (NEBULIZER/TUBING/MOUTHPIECE) KIT 1 kit by Does not apply route daily as needed (wheezing) Dx: COPD   ICD-10: J44.9 1 kit 1    Nebulizers (COMPRESSOR/NEBULIZER) MISC To be used with albuterol 0.083% - Dx: COPD   ICD-10: J44.9 1 each 0     Current Facility-Administered Medications   Medication Dose Route Frequency Provider Last Rate Last Dose    ticagrelor (BRILINTA) tablet 90 mg  90 mg Oral BID Marcos Pérez MD         Reviewed with patient and will remain unchanged except as mentioned in A/P  PHYSICAL EXAM     Vitals:    04/23/20 1421   BP: 130/70   Pulse: 75      Gen Alert, coop, no distress Heart  RRR, no MRG   Head NC, AT, no abnorm Abd  Soft, NT, +BS, no mass, no OM   Eyes PER, conj/corn clear Ext  Ext nl, AT, no C/C/E   Nose Nares nl, no drain, NT Pulse 2+ and symmetric   Throat Lips, mucosa, tongue nl Skin Col/text/turg nl, no vis rash/les   Neck S/S, TM, NT, no bruit/JVD Psych Nl mood and affect   Lung CTA-B, unlabored, no DTP Lymph   No cervical or axillary LA   Ch wall NT, no deform Neuro  Nl gross M/S exam     ASSESSMENT AND PLAN     ~CAD   Date EF Results   Sx   No concerning   Hx 1/16  SVPCI   [facility] 11/10  12/11  7/13  1/16  4/17  3/20     55%  55%  55%  60% PCI of LAD (Jenike)  PCI of RCA (Jenike)  Patent stents to RCA and LAD, 70% twig circ (Jenike)  PCI of RCA (Pepe)  40% midLAD, 90% midCx, 30% prox RCA Tammy Proper)  PCI of distalRCA into PLB Tammy Proper)   MPI 10/12  5/18   66% Apical scar vs artifact  Small minimally reversible inferoapical wall defect c/w artifact   STTE 4/14 55% Non diagnostic study   TTE 4/14 55% Mild TR 37   Plan   Doing well. No cp or sob since stent   ~HTN  Today BP Controlled   Counseling Counseled on diet/salt, exercise and ideal body weight   Plan Continue current meds at doses above   ~Hyperchol  Goal LDL <70   Counseling Counseled on diet, exercise and ideal body weight   Plan PCP liver/lipid surveillance, continue current meds at doses above  Encouraged compliance with statin   10/19 HDL:35, LDL:126  ~Tobacco  Status Smoking 3ppd   Counseling Counseled on risks, cessation therapies discussed   Plan Continued avoidance of first and second hand smoke   ~Compliance  Discussed importance of compliance with meds/diet/salt/exercise; avoidance of tobacco/alcohol/drugs  Plan Patient expressed understanding  ~Followup  Interval: 6 month     [address] Client, am scribing for and in the presence of Guzman Mansfield MD.   SignedCarmen Client 04/23/20 2:25 PM   Provider Sirisha Grace is working as a scribe for and in the presence of me (Guzman Mansfield MD). Working as a scribCarmen rodriguez Client may have prepopulated components of this note with my historical  intellectual property under my direct supervision.   Any additions to this intellectual property were performed in my presence and

## 2020-04-23 ENCOUNTER — OFFICE VISIT (OUTPATIENT)
Dept: CARDIOLOGY CLINIC | Age: 45
End: 2020-04-23
Payer: COMMERCIAL

## 2020-04-23 VITALS — SYSTOLIC BLOOD PRESSURE: 130 MMHG | DIASTOLIC BLOOD PRESSURE: 70 MMHG | HEART RATE: 75 BPM

## 2020-04-23 PROCEDURE — 4004F PT TOBACCO SCREEN RCVD TLK: CPT | Performed by: INTERNAL MEDICINE

## 2020-04-23 PROCEDURE — G8427 DOCREV CUR MEDS BY ELIG CLIN: HCPCS | Performed by: INTERNAL MEDICINE

## 2020-04-23 PROCEDURE — 99214 OFFICE O/P EST MOD 30 MIN: CPT | Performed by: INTERNAL MEDICINE

## 2020-04-23 PROCEDURE — G8417 CALC BMI ABV UP PARAM F/U: HCPCS | Performed by: INTERNAL MEDICINE

## 2020-04-23 NOTE — LETTER
[facility]  [address] [address]  Phone: [phone]  Fax: [phone]    Sydnee Oseguera MD        2020     Marisela Avendaño, [address] [facility]  [address] [facility] [address]    Patient: Michelle Delgadillo  MR Number: [id number]  YOB: 1975  Date of Visit: 2020    Dear Dr. Marisela Avendaño:      Consuello Mole     H+P // CONSULT // OUTPATIENT VISIT // Johnnie Melissa     Referring Doctor Marisela Avendaño MD   Encounter Type Followup     CHIEF COMPLAINT     Visit Type Chronic   Symptoms LE edema   Problems CAD, HTN, CHOL, TOB     HISTORY OF PRESENT ILLNESS     ? GEN - Doing well. No complaints. Drinks 5 energy drinks a day and still smokes 3 ppd.  ? CAD - Denies dizziness, syncope, palpitations. ? HTN - Ambulatory BP readings in good range. No HA or dizziness. ? CHOL - Last cholesterol reviewed and elevated. Tolerating statin without side effects. ? TOB - smoking 3ppd. Not ready to quit. ? MED - Compliant with CV meds listed below without notable side effects. HISTORY/ALLERGIES/ROS     MedHx:   has a past medical history of Allergic, Anemia, Asthma, CAD (coronary artery disease), Chronic back pain, Chronic kidney disease, COPD (chronic obstructive pulmonary disease) ([address] Diabetes mellitus ([address] GERD (gastroesophageal reflux disease), Hyperlipidemia, Hypertension, Pancreatitis, Pneumonia, Psychiatric problem, and Ulcer. SurgHx:  has a past surgical history that includes Tubal ligation;  section; eye surgery; Cholecystectomy, laparoscopic (10/5/2011); Cardiac catheterization (2010, 2011); and Hysterectomy. SocHx:   reports that she has been smoking. She has a 128.00 pack-year smoking history. She has never used smokeless tobacco. She reports that she does not drink alcohol or use drugs.    FamHx:  family history includes Heart Disease in her brother, father, mother, and sister; Sleep Apnea in her brother. Allergies: Sulfa antibiotics; Tramadol; Ultracet [tramadol-acetaminophen]; Bactrim; Cephalexin; Ketorolac tromethamine; and Levofloxacin   ROS:  [x]Full ROS obtained and negative except as mentioned in HPI     MEDICATIONS      Current Outpatient Medications   Medication Sig Dispense Refill    aspirin 81 MG EC tablet Take 81 mg by mouth daily      lisinopril (PRINIVIL;ZESTRIL) 10 MG tablet Take 1 tablet by mouth daily 90 tablet 3    clopidogrel (PLAVIX) 75 MG tablet Take 1 tablet by mouth daily 90 tablet 3    atorvastatin (LIPITOR) 40 MG tablet Take 1 tablet by mouth daily 90 tablet 3    furosemide (LASIX) 20 MG tablet Take 1 tablet by mouth 2 times daily 60 tablet 3    albuterol sulfate HFA (PROVENTIL HFA) 108 (90 Base) MCG/ACT inhaler Inhale 2 puffs into the lungs every 4 hours as needed for Wheezing 1 Inhaler 3    nitroGLYCERIN (NITROSTAT) 0.4 MG SL tablet Place 1 tablet under the tongue every 5 minutes as needed for Chest pain 25 tablet 1    omeprazole 20 MG EC tablet Take by mouth 1 tab bid by pcp      HYDROcodone-acetaminophen (NORCO)  MG per tablet Take 1 tablet by mouth. .      albuterol sulfate  (90 Base) MCG/ACT inhaler Inhale 2 puffs into the lungs every 6 hours as needed for Wheezing 1 Inhaler 0    albuterol (PROVENTIL) (2.5 MG/3ML) 0.083% nebulizer solution Take 3 mLs by nebulization every 6 hours as needed for Wheezing Dx: COPD   ICD-10: J44.9 120 each 0    Respiratory Therapy Supplies (NEBULIZER/TUBING/MOUTHPIECE) KIT 1 kit by Does not apply route daily as needed (wheezing) Dx: COPD   ICD-10: J44.9 1 kit 1    Nebulizers (COMPRESSOR/NEBULIZER) MISC To be used with albuterol 0.083% - Dx: COPD   ICD-10: J44.9 1 each 0     Current Facility-Administered Medications   Medication Dose Route Frequency Provider Last Rate Last Dose    ticagrelor (BRILINTA) tablet 90 mg  90 mg Oral BID Hedy Garces MD

## 2020-05-04 RX ORDER — FUROSEMIDE 20 MG/1
20 TABLET ORAL 2 TIMES DAILY
Qty: 60 TABLET | Refills: 6 | Status: SHIPPED | OUTPATIENT
Start: 2020-05-04 | End: 2021-06-25 | Stop reason: SDUPTHER

## 2020-05-04 NOTE — TELEPHONE ENCOUNTER
Pt calling was in to see DCE on 04/23/2020 and pt states DCE was going to call in a Rx for Lasix however her pharmacy doesn't have anything yet. Can a script be called into [facility] on [address] Ph[address] [phone]?  pls call to advise Thank you

## 2020-09-22 ENCOUNTER — TELEPHONE (OUTPATIENT)
Dept: CARDIOLOGY CLINIC | Age: 45
End: 2020-09-22

## 2020-09-24 ENCOUNTER — VIRTUAL VISIT (OUTPATIENT)
Dept: PULMONOLOGY | Age: 45
End: 2020-09-24
Payer: COMMERCIAL

## 2020-09-24 PROCEDURE — 99443 PR PHYS/QHP TELEPHONE EVALUATION 21-30 MIN: CPT | Performed by: NURSE PRACTITIONER

## 2020-09-24 RX ORDER — IBUPROFEN 200 MG
TABLET ORAL DAILY
COMMUNITY

## 2020-09-24 ASSESSMENT — SLEEP AND FATIGUE QUESTIONNAIRES
HOW LIKELY ARE YOU TO NOD OFF OR FALL ASLEEP IN A CAR, WHILE STOPPED FOR A FEW MINUTES IN TRAFFIC: 2
HOW LIKELY ARE YOU TO NOD OFF OR FALL ASLEEP WHILE SITTING QUIETLY AFTER LUNCH WITHOUT ALCOHOL: 3
HOW LIKELY ARE YOU TO NOD OFF OR FALL ASLEEP WHILE LYING DOWN TO REST IN THE AFTERNOON WHEN CIRCUMSTANCES PERMIT: 2
ESS TOTAL SCORE: 22
HOW LIKELY ARE YOU TO NOD OFF OR FALL ASLEEP WHILE WATCHING TV: 3
HOW LIKELY ARE YOU TO NOD OFF OR FALL ASLEEP WHEN YOU ARE A PASSENGER IN A CAR FOR AN HOUR WITHOUT A BREAK: 3
HOW LIKELY ARE YOU TO NOD OFF OR FALL ASLEEP WHILE SITTING AND READING: 3
HOW LIKELY ARE YOU TO NOD OFF OR FALL ASLEEP WHILE SITTING AND TALKING TO SOMEONE: 3
HOW LIKELY ARE YOU TO NOD OFF OR FALL ASLEEP WHILE SITTING INACTIVE IN A PUBLIC PLACE: 3

## 2020-09-24 NOTE — PROGRESS NOTES
Tino Coker MD, FAASM, [facility]  April Dsouza, MSN, RN, CNP     [address] [facility] SLEEP MEDICINE  [facility] [address] [facility] [address]  Dept: [phone]  Dept Fax: [phone]: Steffanie Wilson [address] [place] [address] [phone]    Subjective:     Patient ID: Elizabeth Trujillo is a 54 y.o. female. Chief Complaint   Patient presents with    Sleep Apnea       HPI:      Sleep Medicine Telephone Visit    Pursuant to the emergency declaration under the [address] [place] [address], [facility] waiver authority and the [employer] Act this Telephone Visit was insisted, with patient's consent, to reduce the patient's risk of exposure to COVID-19 and provide continuity of care for an established patient. Services were provided through a synchronous discussion over a telephone and/or Video chat to substitute for in-person clinic visit, and coded as such. While patient is at home. [place] - Total score: 22    Follow-up :     Last Visit : January 2019      Patient reports the listed chronic Co-morbidities: DM, HTN, CAD, COPD, Obesity    are borderline controlled at this time. Subjective Health Changes: Stent 3/2020     Nocturia   2-4  per night.    Having  HA upon waking  [] Yes  [x] No   Dry mouth upon waking   Dry Nose  Dry Eyes  [x] Yes  [] No   Congestion upon waking   [] Yes  [x] No    Nose Bleeds  [] Yes  [x] No   Using Sleep Aides    [x] NA   Difficulties falling asleep  [x] Yes  [] No   Difficulties staying asleep  [x] Yes  [] No   Approximate time to bed  12-6am   Approximate wake time  9am   Taking Naps  frequent   If taking naps usual length  10 min    Drowsy when driving  [x] Yes  [] No     Does patient carry a DOT/CDL  [] Yes  [x] No     Does patient carry FAA/[employer] License   [] Yes  [x] No Diagnosis Orders   1. Obstructive sleep apnea     2. Type 2 diabetes mellitus without complication, unspecified whether long term insulin use ([address]     3. Morbid obesity, unspecified obesity type ([address]     4. Essential hypertension     5. Coronary artery disease involving native coronary artery of native heart without angina pectoris     6. Chronic obstructive pulmonary disease, unspecified COPD type ([address]         The chronic medical conditions listed are directly related to the primary diagnosis listed above. The management of the primary diagnosis affects the secondary diagnosis and vice versa. Assessment/Plan:     Type 2 diabetes mellitus without complication (HCC)  Chronic- Stable. Cont meds per PCP and other physicians. Morbid obesity, unspecified obesity type ([address]  Chronic-Stable. Encouraged her to work on weight loss through diet and exercise. Essential hypertension  Chronic- Stable. Cont meds per PCP and other physicians. Coronary artery disease involving native coronary artery of native heart without angina pectoris  Chronic- Stable. Cont meds per PCP and other physicians. COPD (chronic obstructive pulmonary disease) (HCC)  Chronic- Stable. Cont meds per PCP and other physicians. Obstructive sleep apnea  Needing testing and follow up       The primary encounter diagnosis was Obstructive sleep apnea. Diagnoses of Type 2 diabetes mellitus without complication, unspecified whether long term insulin use ([address] Morbid obesity, unspecified obesity type ([address] Essential hypertension, Coronary artery disease involving native coronary artery of native heart without angina pectoris, and Chronic obstructive pulmonary disease, unspecified COPD type ([address] were also pertinent to this visit. The chronic medical conditions listed are directly related to the primary diagnosis listed above. The management of the primary diagnosis affects the secondary diagnosis and vice versa.      - Will order PSG and titration studies to get the patient diagnoses and treated if positive  - Continue medications per her PCP and other physicians.   - she instructed not to drive unless had 4 hrs of effective therapy for her KATTY the night before. - Did review the risks of under or untreated KATTY including, but not limited to, higher risks of motor vehicle accidents, stroke, heart attacks, and death. - she understands and accepts all these risks. - The patient is advised to use the machine every night.   - Patient using  [employer] or [address] will chose for supplies  - Patient not able to access video feed. Visit completed via telephone communications. 20 min spent with patient.   - Educated on follow up, treatment, and timing  -F/U: 3 months      No orders of the defined types were placed in this encounter. No orders of the defined types were placed in this encounter. No orders of the defined types were placed in this encounter.       Lizzette Law, MSN, RN, CNP

## 2020-09-25 ENCOUNTER — TELEPHONE (OUTPATIENT)
Dept: SLEEP CENTER | Age: 45
End: 2020-09-25

## 2020-09-25 NOTE — TELEPHONE ENCOUNTER
Called pt to schedule psg and cpap -per Deborah. She needs to schedule 1 covid test but does not want the nose swab -rather the mouth. I need to find out where I can schedule this and call pt back.

## 2020-09-28 NOTE — TELEPHONE ENCOUNTER
I spoke with the pt and relayed message per DCE. She stated that she had found the contact info and spoke with the NP there and was told that they will set up the sleep study and a f/u appointment.

## 2020-10-16 ENCOUNTER — TELEPHONE (OUTPATIENT)
Dept: CARDIOLOGY CLINIC | Age: 45
End: 2020-10-16

## 2020-10-16 NOTE — TELEPHONE ENCOUNTER
Patient calling and trying to set up appt for her follow up was told can be seen in Dec 2020- appt has been placed

## 2020-10-16 NOTE — TELEPHONE ENCOUNTER
Pt has been scheduled on 12/7/20 with dce. [facility] will call to schedule  sooner since he is having some issues.

## 2020-10-16 NOTE — TELEPHONE ENCOUNTER
Pt calling for an appt with DCE and would like it same day as pt  Marin Morton (who he needs a stress echo too) where can we ann-marie both appts?  Pls call to advise Thank you

## 2020-11-02 RX ORDER — LISINOPRIL 10 MG/1
TABLET ORAL
Qty: 90 TABLET | Refills: 3 | Status: SHIPPED | OUTPATIENT
Start: 2020-11-02 | End: 2021-11-10

## 2020-11-02 RX ORDER — CLOPIDOGREL BISULFATE 75 MG/1
TABLET ORAL
Qty: 90 TABLET | Refills: 3 | Status: SHIPPED | OUTPATIENT
Start: 2020-11-02 | End: 2021-11-10

## 2020-11-02 NOTE — TELEPHONE ENCOUNTER
RX APPROVAL:      Refill:   Requested Prescriptions     Pending Prescriptions Disp Refills    lisinopril (PRINIVIL;ZESTRIL) 10 MG tablet [Pharmacy Med Name: LISINOPRIL 10 MG TABLET] 30 tablet 19     Sig: TAKE 1 TABLET BY MOUTH EVERY DAY    clopidogrel (PLAVIX) 75 MG tablet [Pharmacy Med Name: CLOPIDOGREL 75 MG TABLET] 30 tablet 5     Sig: TAKE 1 TABLET BY MOUTH EVERY DAY      Last OV: 4/23/2020   Last EKG:   Last Labs:   Lab Results   Component Value Date    GLUCOSE 149 03/13/2020    GLUCOSE 144 03/30/2015    BUN 18 03/13/2020    CREATININE 0.6 03/13/2020    LABGLOM >60 03/13/2020    EGFRAA >60.0 03/30/2015     03/13/2020    K 4.4 03/13/2020     03/13/2020    CO2 27 03/13/2020    CALCIUM 9.6 03/13/2020     Lab Results   Component Value Date     03/13/2020     03/13/2020    CO2 27 03/13/2020    ANIONGAP 11 03/13/2020    GLUCOSE 149 03/13/2020    GLUCOSE 144 03/30/2015    BUN 18 03/13/2020    CREATININE 0.6 03/13/2020    LABGLOM >60 03/13/2020    GFRAA >60 03/13/2020    GFRAA >60 08/28/2012    CALCIUM 9.6 03/13/2020    PROT 7.1 03/30/2015    LABALBU 3.7 03/30/2015    BILITOT 0.4 03/30/2015    ALKPHOS 83 03/30/2015    ALKPHOS 97 08/28/2012    AST 24 10/31/2019    ALT 31 10/31/2019     Lab Results   Component Value Date    ALT 31 10/31/2019    AST 24 10/31/2019     Lab Results   Component Value Date    K 4.4 03/13/2020       Plan and labs reviewed

## 2020-11-17 RX ORDER — ATORVASTATIN CALCIUM 40 MG/1
TABLET, FILM COATED ORAL
Qty: 30 TABLET | Refills: 5 | Status: SHIPPED | OUTPATIENT
Start: 2020-11-17 | End: 2021-06-28 | Stop reason: SINTOL

## 2020-12-07 ENCOUNTER — TELEPHONE (OUTPATIENT)
Dept: CARDIOLOGY CLINIC | Age: 45
End: 2020-12-07

## 2020-12-07 NOTE — TELEPHONE ENCOUNTER
Pt she began having a cough and scratchy throat and asking if she can change her 115 appt today to   a VV. Please call to advise.

## 2020-12-07 NOTE — TELEPHONE ENCOUNTER
Ok to change to a virtual visit today or if patient is feeling ok she reschedule for in a couple of months.

## 2021-01-04 ENCOUNTER — TELEPHONE (OUTPATIENT)
Dept: SLEEP CENTER | Age: 46
End: 2021-01-04

## 2021-03-10 ENCOUNTER — TELEPHONE (OUTPATIENT)
Dept: CARDIOLOGY CLINIC | Age: 46
End: 2021-03-10

## 2021-03-10 RX ORDER — NITROGLYCERIN 0.4 MG/1
0.4 TABLET SUBLINGUAL EVERY 5 MIN PRN
Qty: 25 TABLET | Refills: 1 | Status: SHIPPED | OUTPATIENT
Start: 2021-03-10

## 2021-03-10 NOTE — TELEPHONE ENCOUNTER
She can be scheduled on 3/19 at 8 am with DCE or with a NP on a different day.  Nitro refill sent to [facility]. If patient continues to have CP she needs to go to the ER

## 2021-03-10 NOTE — TELEPHONE ENCOUNTER
She had to cxl appt today with KARI because her daughters doctor wants to see her daughter today at 2pm for a pre op to a surgery she really needs . Patient asked to make appt a virtual appt but KARI would like her to rs . KARI said VB RN would call patient with appt time . Also she had an episode of chest pain yesterday after working in the yard (she is ok now) . She cant find her nitro . Can a rx for nitro be sent to [facility] in [place] ?

## 2021-03-19 ENCOUNTER — TELEPHONE (OUTPATIENT)
Dept: CARDIOLOGY CLINIC | Age: 46
End: 2021-03-19

## 2021-03-19 NOTE — TELEPHONE ENCOUNTER
PT had appt with DCE today, but overslept and missed it. She wants to reschedule as soon as possible, but nothing available. Can she be worked in, or is it okay to see NP? She states DCE didn't want to do VV call. Pls call to advise. Thank you.

## 2021-03-26 ENCOUNTER — OFFICE VISIT (OUTPATIENT)
Dept: CARDIOLOGY CLINIC | Age: 46
End: 2021-03-26
Payer: COMMERCIAL

## 2021-03-26 VITALS
HEIGHT: 62 IN | SYSTOLIC BLOOD PRESSURE: 128 MMHG | HEART RATE: 83 BPM | WEIGHT: 240.6 LBS | BODY MASS INDEX: 44.27 KG/M2 | DIASTOLIC BLOOD PRESSURE: 78 MMHG | OXYGEN SATURATION: 91 %

## 2021-03-26 DIAGNOSIS — R42 DIZZINESS: ICD-10-CM

## 2021-03-26 DIAGNOSIS — I10 ESSENTIAL HYPERTENSION: ICD-10-CM

## 2021-03-26 DIAGNOSIS — I25.10 CORONARY ARTERY DISEASE INVOLVING NATIVE CORONARY ARTERY OF NATIVE HEART WITHOUT ANGINA PECTORIS: Primary | ICD-10-CM

## 2021-03-26 DIAGNOSIS — R07.9 CHEST PAIN, UNSPECIFIED TYPE: ICD-10-CM

## 2021-03-26 DIAGNOSIS — E78.2 MIXED HYPERLIPIDEMIA: ICD-10-CM

## 2021-03-26 DIAGNOSIS — Z72.0 TOBACCO ABUSE: ICD-10-CM

## 2021-03-26 PROCEDURE — G8427 DOCREV CUR MEDS BY ELIG CLIN: HCPCS | Performed by: NURSE PRACTITIONER

## 2021-03-26 PROCEDURE — G8417 CALC BMI ABV UP PARAM F/U: HCPCS | Performed by: NURSE PRACTITIONER

## 2021-03-26 PROCEDURE — 99214 OFFICE O/P EST MOD 30 MIN: CPT | Performed by: NURSE PRACTITIONER

## 2021-03-26 PROCEDURE — 4004F PT TOBACCO SCREEN RCVD TLK: CPT | Performed by: NURSE PRACTITIONER

## 2021-03-26 PROCEDURE — 93000 ELECTROCARDIOGRAM COMPLETE: CPT | Performed by: NURSE PRACTITIONER

## 2021-03-26 PROCEDURE — G8484 FLU IMMUNIZE NO ADMIN: HCPCS | Performed by: NURSE PRACTITIONER

## 2021-03-26 RX ORDER — RANOLAZINE 500 MG/1
500 TABLET, EXTENDED RELEASE ORAL 2 TIMES DAILY
Qty: 60 TABLET | Refills: 3 | Status: SHIPPED | OUTPATIENT
Start: 2021-03-26

## 2021-03-26 NOTE — PATIENT INSTRUCTIONS
Schedule KATTY work up     Schedule carotid duplex    Start taking Ranexa 500 mg twice a day    I will call you after I speak with Dr. Kyler Rascon     Stop Smoking

## 2021-04-08 ENCOUNTER — TELEPHONE (OUTPATIENT)
Dept: CARDIOLOGY CLINIC | Age: 46
End: 2021-04-08

## 2021-04-08 NOTE — TELEPHONE ENCOUNTER
Pt calling she woke up with a cough, stuffy nose and is wheezing she wants to know if her appt for tomorrow needs to be rescheduled?  pls call to advise thank you

## 2021-04-16 ENCOUNTER — HOSPITAL ENCOUNTER (OUTPATIENT)
Dept: CARDIAC CATH/INVASIVE PROCEDURES | Age: 46
Discharge: HOME | End: 2021-04-16
Payer: COMMERCIAL

## 2021-04-16 ENCOUNTER — TELEPHONE (OUTPATIENT)
Dept: CARDIOLOGY CLINIC | Age: 46
End: 2021-04-16

## 2021-04-16 NOTE — TELEPHONE ENCOUNTER
Cassandra calling to advise that she needs to cancel the [address] scheduled for today. She woke up congested and her lips were blue, she needed to take a breathing treatment. She would like to reschedule the appt. Please advise. Thank you.

## 2021-04-16 NOTE — TELEPHONE ENCOUNTER
Spoke with DCE about patient and the patient can be rescheduled one more time for left heart cath. Patient has rescheduled multiple appointments and heart caths with a no show rate of 50%. I spoke to the patient at length and explained to her how important it is to show up for her scheduled procedures. She expressed understanding and said she would make it to the next appt. Please call patient to reschedule lhc with DCE.  Thanks

## 2021-04-26 ENCOUNTER — HOSPITAL ENCOUNTER (OUTPATIENT)
Dept: CARDIAC CATH/INVASIVE PROCEDURES | Age: 46
Discharge: HOME | End: 2021-04-26
Attending: INTERNAL MEDICINE | Admitting: INTERNAL MEDICINE
Payer: COMMERCIAL

## 2021-04-26 VITALS
HEART RATE: 82 BPM | TEMPERATURE: 98.8 F | OXYGEN SATURATION: 98 % | WEIGHT: 240 LBS | RESPIRATION RATE: 20 BRPM | BODY MASS INDEX: 45.31 KG/M2 | HEIGHT: 61 IN | DIASTOLIC BLOOD PRESSURE: 80 MMHG | SYSTOLIC BLOOD PRESSURE: 145 MMHG

## 2021-04-26 LAB
ANION GAP SERPL CALCULATED.3IONS-SCNC: 9 MMOL/L (ref 3–16)
BUN BLDV-MCNC: 18 MG/DL (ref 7–20)
CALCIUM SERPL-MCNC: 9.2 MG/DL (ref 8.3–10.6)
CHLORIDE BLD-SCNC: 101 MMOL/L (ref 99–110)
CO2: 29 MMOL/L (ref 21–32)
CREAT SERPL-MCNC: 0.6 MG/DL (ref 0.6–1.1)
GFR AFRICAN AMERICAN: >60
GFR NON-AFRICAN AMERICAN: >60
GLUCOSE BLD-MCNC: 146 MG/DL (ref 70–99)
HCT VFR BLD CALC: 55.5 % (ref 36–48)
HEMOGLOBIN: 17.8 G/DL (ref 12–16)
MCH RBC QN AUTO: 28.4 PG (ref 26–34)
MCHC RBC AUTO-ENTMCNC: 32.1 G/DL (ref 31–36)
MCV RBC AUTO: 88.3 FL (ref 80–100)
PDW BLD-RTO: 17.3 % (ref 12.4–15.4)
PLATELET # BLD: 179 K/UL (ref 135–450)
PMV BLD AUTO: 8.7 FL (ref 5–10.5)
POTASSIUM SERPL-SCNC: 4.8 MMOL/L (ref 3.5–5.1)
RBC # BLD: 6.29 M/UL (ref 4–5.2)
SODIUM BLD-SCNC: 139 MMOL/L (ref 136–145)
WBC # BLD: 11.7 K/UL (ref 4–11)

## 2021-04-26 PROCEDURE — 2500000003 HC RX 250 WO HCPCS

## 2021-04-26 PROCEDURE — 99152 MOD SED SAME PHYS/QHP 5/>YRS: CPT

## 2021-04-26 PROCEDURE — 92928 PRQ TCAT PLMT NTRAC ST 1 LES: CPT | Performed by: INTERNAL MEDICINE

## 2021-04-26 PROCEDURE — C1887 CATHETER, GUIDING: HCPCS

## 2021-04-26 PROCEDURE — 2709999900 HC NON-CHARGEABLE SUPPLY

## 2021-04-26 PROCEDURE — 93005 ELECTROCARDIOGRAM TRACING: CPT | Performed by: INTERNAL MEDICINE

## 2021-04-26 PROCEDURE — 36415 COLL VENOUS BLD VENIPUNCTURE: CPT

## 2021-04-26 PROCEDURE — 80048 BASIC METABOLIC PNL TOTAL CA: CPT

## 2021-04-26 PROCEDURE — 99152 MOD SED SAME PHYS/QHP 5/>YRS: CPT | Performed by: INTERNAL MEDICINE

## 2021-04-26 PROCEDURE — C1874 STENT, COATED/COV W/DEL SYS: HCPCS

## 2021-04-26 PROCEDURE — 93458 L HRT ARTERY/VENTRICLE ANGIO: CPT | Performed by: INTERNAL MEDICINE

## 2021-04-26 PROCEDURE — 92928 PRQ TCAT PLMT NTRAC ST 1 LES: CPT

## 2021-04-26 PROCEDURE — 85347 COAGULATION TIME ACTIVATED: CPT

## 2021-04-26 PROCEDURE — C1769 GUIDE WIRE: HCPCS

## 2021-04-26 PROCEDURE — C1725 CATH, TRANSLUMIN NON-LASER: HCPCS

## 2021-04-26 PROCEDURE — 6360000004 HC RX CONTRAST MEDICATION: Performed by: INTERNAL MEDICINE

## 2021-04-26 PROCEDURE — 93458 L HRT ARTERY/VENTRICLE ANGIO: CPT

## 2021-04-26 PROCEDURE — 85027 COMPLETE CBC AUTOMATED: CPT

## 2021-04-26 PROCEDURE — 99153 MOD SED SAME PHYS/QHP EA: CPT

## 2021-04-26 PROCEDURE — C1894 INTRO/SHEATH, NON-LASER: HCPCS

## 2021-04-26 PROCEDURE — 6360000002 HC RX W HCPCS

## 2021-04-26 RX ADMIN — IOPAMIDOL 97 ML: 755 INJECTION, SOLUTION INTRAVENOUS at 15:09

## 2021-04-26 NOTE — OP NOTE
[facility]  Procedure Note    Procedure: [facility], PCI of PLB  Indication: Unstable angina, ongoing ischemic symptoms    Procedure Details:  Consent Access Bleed R Sedat Start Stop Versed Fentanyl Contrast Fluoro EBL Comp Spec   Yes RRA low MCSFC 1347 1453 5 150 97 6.8 <20 None None   *Ultrasound Note: Ultrasound guidance used to determine aforementioned artery patency, size (>2mm), anatomic variations and ideal puncture location. Real-time ultrasound utilized concurrent with vascular needle entry into the artery. Image(s) permanently recorded and reported in the patient chart. *Sedation Note: MCSFC = minimal conscious sedation for comfort    Findings:  Artery Findings/Result   LM Normal   LAD Patent mid stent with 30% instent   Cx 90% mid small caliber ~2mm   RI patent   RCA Patent distal stent, 95% third PLB, 50% ostial to proximal PDA   LVEDP 15-25 (coughing)   LVG NA     Intervention:   PCI of PLB    Post Cath Dx:   CAD as above    Med Rec:   Recommendation Indicated?  Not Given Due To: Note   DAPT  yes  Asa, plavix   STATIN - HIGH DOSE yes  lipitor 40   BETA BLOCKER yes Lung disease    ACE/ARB/ARNI no     ALDACTONE no       Non-Med Rec:   Category Recommendation   EF ASSESSMENT Noninvasive assessment of EF if LVG deferred as IP/OP as appropriate   TOBACCO Avoidance of first and second hand smoke   DIET/EXERCISE Maintain healthy lifestyle with focus on diet, exercise and weight loss

## 2021-04-26 NOTE — PRE SEDATION
Brief Pre-Op Note/Sedation Assessment       Patient Demographics     Pavan Dunn 1975 Room/bed info not found [id number] 9:53 AM     Procedure     Planned Procedure:  Cardiac Catheterization Procedure  Post Procedure Plan:  Return to same level of care  Consent:   I have discussed with the patient and/or the patient representative the indication, alternatives, and the possible risks and/or complications of the planned procedure and the anesthesia methods. The patient and/or patient representative appear to understand and agree to proceed. Indications:   *CAD Presentation: New Onset Angina <= 2 months and Worsening Angina  *Angina Class(2 wks): CCS III - Symptoms with everyday living activities, i.e., moderate limitation  *NYHA Class(2wks): No symptoms  *Stress/Imaging Tests: None    Patient History     Chief Complaint:  Chest Pain/Pressure  Anginal Equivalent  Dyspnea on Exertion  Dyspnea  Allergies:     Allergies   Allergen Reactions    Sulfa Antibiotics Shortness Of Breath    Tramadol      Breathing difficulties    Ultracet [Tramadol-Acetaminophen]      Rash      Bactrim Rash    Cephalexin Rash    Ketorolac Tromethamine Rash    Levofloxacin Rash     Past Medical History:   Past Medical History:   Diagnosis Date    Allergic     Anemia     Asthma     CAD (coronary artery disease)     Stent LAD    Chronic back pain     back    Chronic kidney disease     kidney stones    [facility]) ([facility])     Diabetes mellitus ([address]     gestional diabetes only    GERD (gastroesophageal reflux disease)     Hyperlipidemia     Hypertension     Pancreatitis 10/2011    Pneumonia 2008    Psychiatric problem     anxiewty, depression    Ulcer     stomach     Surgical History:   Past Surgical History:   Procedure Laterality Date    CARDIAC CATHETERIZATION  11/2010, 12/2011    Dr. Booker Baumgarten, LAPAROSCOPIC  10/5/2011    with intraoperative cholangiogram    EYE SURGERY      age 1   [facility] HYSTERECTOMY      TUBAL LIGATION       Medications:    Current Outpatient Medications   Medication Sig Dispense Refill    ranolazine (RANEXA) 500 MG extended release tablet Take 1 tablet by mouth 2 times daily 60 tablet 3    nitroGLYCERIN (NITROSTAT) 0.4 MG SL tablet Place 1 tablet under the tongue every 5 minutes as needed for Chest pain 25 tablet 1    atorvastatin (LIPITOR) 40 MG tablet TAKE 1 TABLET BY MOUTH EVERY DAY 30 tablet 5    lisinopril (PRINIVIL;ZESTRIL) 10 MG tablet TAKE 1 TABLET BY MOUTH EVERY DAY 90 tablet 3    clopidogrel (PLAVIX) 75 MG tablet TAKE 1 TABLET BY MOUTH EVERY DAY 90 tablet 3    ibuprofen (ADVIL;MOTRIN) 200 MG tablet Take 1,200-1,600 mg by mouth daily      Cholecalciferol (VITAMIN D3) 125 MCG (5000 UT) TABS Take by mouth      Biotin w/ Vitamins C & E 1250-7.5-7.5 MCG-MG-UNT CHEW Take by mouth      furosemide (LASIX) 20 MG tablet Take 1 tablet by mouth 2 times daily 60 tablet 6    aspirin 81 MG EC tablet Take 81 mg by mouth daily      albuterol sulfate HFA (PROVENTIL HFA) 108 (90 Base) MCG/ACT inhaler Inhale 2 puffs into the lungs every 4 hours as needed for Wheezing 1 Inhaler 3    omeprazole 20 MG EC tablet Take by mouth 1 tab bid by pcp      HYDROcodone-acetaminophen (NORCO)  MG per tablet Take 1 tablet by mouth. .      albuterol sulfate  (90 Base) MCG/ACT inhaler Inhale 2 puffs into the lungs every 6 hours as needed for Wheezing 1 Inhaler 0    albuterol (PROVENTIL) (2.5 MG/3ML) 0.083% nebulizer solution Take 3 mLs by nebulization every 6 hours as needed for Wheezing Dx: COPD   ICD-10: J44.9 120 each 0    Respiratory Therapy Supplies (NEBULIZER/TUBING/MOUTHPIECE) KIT 1 kit by Does not apply route daily as needed (wheezing) Dx: COPD   ICD-10: J44.9 1 kit 1    Nebulizers (COMPRESSOR/NEBULIZER) MISC To be used with albuterol 0.083% - Dx: COPD   ICD-10: J44.9 1 each 0     Current Facility-Administered Medications Medication Dose Route Frequency Provider Last Rate Last Admin    ticagrelor (BRILINTA) tablet 90 mg  90 mg Oral BID Lee Wisdom MD         Anti-Anginal(2wks): ANTI-ANGINAL MEDS: Yes: Ranolazine, Aspirin and Statin (Any)    Pre-Sedation Assessment     Vital Signs:  LMP 10/15/2012   Pre-Sedation Exam: I have personally completed a history, physical exam & review of systems for this patient (see notes). Hx Anesthesia Comps: None  Modified Mallampati: II (soft palate, uvula, fauces visible)  ASA Classification: Class 2 - A normal healthy patient with mild systemic disease and Class 2 -- A normal healthy patient with mild systemic disease  Wing Scale: Activity:   2 - Able to move 4 extremities voluntarily on command  Respiration:   2 - Able to breathe deeply and cough freely  Circulation:   2 - BP+/- 20mmHg of normal  Consciousness:   2 - Fully awake  Oxygen Sat: 2 - Able to maintain oxygen saturation >92% on room air    Sedation Plan     Goals: Guard safety/welfare, minimize discomfort/pain/negative psychological responses to treatment by providing sedation/analgesia, maximize potential amnesia. Patient to meet pre-procedure discharge plan.   Meds Planned:  IV fentanyl and/or versed  Sedation Candidacy: Patient is an appropriate candidate for plan of sedation: Yes    Electronic Signature     Ping Contreras RN on 4/26/2021 at 9:53 AM

## 2021-04-27 LAB
EKG ATRIAL RATE: 80 BPM
EKG DIAGNOSIS: NORMAL
EKG P AXIS: 73 DEGREES
EKG P-R INTERVAL: 134 MS
EKG Q-T INTERVAL: 378 MS
EKG QRS DURATION: 78 MS
EKG QTC CALCULATION (BAZETT): 435 MS
EKG R AXIS: 88 DEGREES
EKG T AXIS: 81 DEGREES
EKG VENTRICULAR RATE: 80 BPM

## 2021-04-27 PROCEDURE — 93010 ELECTROCARDIOGRAM REPORT: CPT | Performed by: INTERNAL MEDICINE

## 2021-04-28 LAB
POC ACT LR: 338 SEC
POC ACT LR: 391 SEC

## 2021-06-25 RX ORDER — FUROSEMIDE 20 MG/1
20 TABLET ORAL 2 TIMES DAILY
Qty: 60 TABLET | Refills: 6 | Status: SHIPPED | OUTPATIENT
Start: 2021-06-25 | End: 2022-01-26

## 2021-06-25 NOTE — TELEPHONE ENCOUNTER
Medication Refill    Medication needing refilled:  furosemide (LASIX) 20 MG - Please send to new pharm listed below    Dosage of the medication: 20 mg     How are you taking this medication (QD, BID, TID, QID, PRN): 2 tab twice a day    30 or 90 day supply called in: 90 day supply    When will you run out of your medication:    Which Pharmacy are we sending the medication to?: [facility]  [place] [id number], [address] [facility]   Phone:  [phone]  Fax:  [phone]      PT states she needs clarification on cholesterol medication. She is not taking anything at the moment.  Please call to advise

## 2021-06-28 NOTE — TELEPHONE ENCOUNTER
Pt calling back-stated she stopped taking her Atorvastatin before her Cardiac Cath 4/26/21. She was having nose bleeds while on it. She tried to stop, then restart it, but the nose bleeds came back. Asking if she should be taking another statin?

## 2021-07-07 ENCOUNTER — TELEPHONE (OUTPATIENT)
Dept: CARDIOLOGY CLINIC | Age: 46
End: 2021-07-07

## 2021-07-07 NOTE — TELEPHONE ENCOUNTER
Pt states she has an appt with NPTS on 7/13/21 and  her daughter (Stacy)has an appt with DCE on 7/14/21 at 4 pm. Asking if she can be seen on 7/14/21 with DCE so they do not have to make 2 trips. Please call to advise.

## 2021-11-11 ENCOUNTER — TELEPHONE (OUTPATIENT)
Dept: CARDIOLOGY CLINIC | Age: 46
End: 2021-11-11

## 2021-11-11 NOTE — TELEPHONE ENCOUNTER
Pt calling to ask if Dr. Naoma Mortimer can take her back as a pt. Stated she does not trust anyone else, with her heart.

## 2021-11-29 NOTE — TELEPHONE ENCOUNTER
No show rate was 57%. Unable to provide good/safe medical care due to noncompliance. Recommend alternative practice/physician. Do not reschedule, she was discharged from the practice.

## 2022-01-21 ENCOUNTER — TELEPHONE (OUTPATIENT)
Dept: CARDIOLOGY CLINIC | Age: 47
End: 2022-01-21

## 2022-01-21 NOTE — TELEPHONE ENCOUNTER
Pt calling stating that she has been on the search to find a new cardiologist due to [address] [place] dismissing patient. She states that she wants to be seen one time by DCE just to make sure everything is okay during her search to find a cardiologist. She states she is having chest pains and a rapid heart beat 140-150%. She states has been on the search but anytime she speaks with someone they refer her to a PCP which she already has.  Pls advise thank you

## 2022-01-24 NOTE — TELEPHONE ENCOUNTER
Per DCE, patient needs to follow with her PCP or find another cardiologist. Aunalana Pina is no longer seeing the patient.

## 2022-02-10 ENCOUNTER — OFFICE VISIT (OUTPATIENT)
Dept: PULMONOLOGY | Age: 47
End: 2022-02-10
Payer: COMMERCIAL

## 2022-02-10 VITALS — HEART RATE: 96 BPM | OXYGEN SATURATION: 94 %

## 2022-02-10 DIAGNOSIS — J96.11 CHRONIC RESPIRATORY FAILURE WITH HYPOXIA (HCC): ICD-10-CM

## 2022-02-10 DIAGNOSIS — J44.1 CHRONIC OBSTRUCTIVE PULMONARY DISEASE WITH ACUTE EXACERBATION (HCC): Primary | ICD-10-CM

## 2022-02-10 DIAGNOSIS — R05.9 COUGH: ICD-10-CM

## 2022-02-10 PROCEDURE — G8417 CALC BMI ABV UP PARAM F/U: HCPCS | Performed by: NURSE PRACTITIONER

## 2022-02-10 PROCEDURE — G8484 FLU IMMUNIZE NO ADMIN: HCPCS | Performed by: NURSE PRACTITIONER

## 2022-02-10 PROCEDURE — 99214 OFFICE O/P EST MOD 30 MIN: CPT | Performed by: NURSE PRACTITIONER

## 2022-02-10 PROCEDURE — G8427 DOCREV CUR MEDS BY ELIG CLIN: HCPCS | Performed by: NURSE PRACTITIONER

## 2022-02-10 PROCEDURE — 4004F PT TOBACCO SCREEN RCVD TLK: CPT | Performed by: NURSE PRACTITIONER

## 2022-02-10 PROCEDURE — 3023F SPIROM DOC REV: CPT | Performed by: NURSE PRACTITIONER

## 2022-02-10 RX ORDER — IPRATROPIUM BROMIDE AND ALBUTEROL SULFATE 2.5; .5 MG/3ML; MG/3ML
1 SOLUTION RESPIRATORY (INHALATION) EVERY 4 HOURS
COMMUNITY

## 2022-02-10 RX ORDER — PREDNISONE 10 MG/1
TABLET ORAL
Qty: 30 TABLET | Refills: 0 | Status: SHIPPED | OUTPATIENT
Start: 2022-02-10

## 2022-02-10 RX ORDER — PREDNISONE 10 MG/1
10 TABLET ORAL DAILY
Qty: 30 TABLET | Refills: 0 | Status: SHIPPED | OUTPATIENT
Start: 2022-02-26 | End: 2022-03-28

## 2022-02-10 RX ORDER — FLUTICASONE FUROATE, UMECLIDINIUM BROMIDE AND VILANTEROL TRIFENATATE 200; 62.5; 25 UG/1; UG/1; UG/1
POWDER RESPIRATORY (INHALATION)
COMMUNITY
End: 2022-04-27 | Stop reason: ALTCHOICE

## 2022-02-10 RX ORDER — AZITHROMYCIN 250 MG/1
TABLET, FILM COATED ORAL
Qty: 12 TABLET | Refills: 1 | Status: SHIPPED | OUTPATIENT
Start: 2022-02-10 | End: 2022-02-20

## 2022-02-10 ASSESSMENT — ENCOUNTER SYMPTOMS
COUGH: 1
CONSTIPATION: 0
SHORTNESS OF BREATH: 1
ABDOMINAL PAIN: 0
COLOR CHANGE: 0

## 2022-02-10 NOTE — PROGRESS NOTES
[facility] Follow Up Note    Subjective:   CHIEF COMPLAINT / HPI: COPD   The patient is 51 y.o. female who presents today for a routine follow up visit related to the above mentioned issues. There is a PMH significant for asthma, CAD, CKD,DM, GERD, HTN. She was last evaluated by me in 2019. At that time, symptoms including cough and SOB were worse over the preceding couple of days. Presently she reports having struggled with her cough and SOB since October. At that time her son was diagnosed with COVID and she was treated as if she had it as well with systemic steroids. She has noticed since that time that she has struggled to maintain oxygen saturations above 90%. She has been to her PCP and has had a couple of rounds of Prednisone and Abx at this point and she feels better than worse again when the Prednisone is tapered off. Today she denies fever or chills as well as purulent mucous. She continues to smoke. She has been using Trelegy and Duoneb. She does not typically use oxygen at home.          Past Medical History:   Diagnosis Date    Allergic     Anemia     Asthma     CAD (coronary artery disease)     Stent LAD    Chronic back pain     back    Chronic kidney disease     kidney stones    [facility]ch[facility] obstructive pulmonary [facility]) ([facility])     Diabetes mellitus ([facility] [address]     gestional diabetes only    GERD (gastroesophageal reflux disease)     Hyperlipidemia     Hypertension     Pancreatitis 10/2011    Pneumonia 2008    Psychiatric problem     anxiewty, depression    Ulcer     stomach     Social History:    Social History     Tobacco Use   Smoking Status Current Every Day Smoker    Packs/day: 2.00    Years: 32.00    Pack years: 64.00   Smokeless Tobacco Never Used   Tobacco Comment    started smoking at age 14 / smoked up to 4 p.p.d / now smoking 2 to 3 p.p.d     Current Medications:     Current Outpatient Medications on File Prior to Visit   Medication Sig Dispense Refill    ipratropium-albuterol (DUONEB) 0.5-2.5 (3) MG/3ML SOLN nebulizer solution Inhale 1 vial into the lungs every 4 hours      Fluticasone-Umeclidin-Vilant (TRELEGY ELLIPTA) 200-62.5-25 MCG/INH AEPB Inhale into the lungs      furosemide (LASIX) 20 MG tablet TAKE 1 TABLET BY MOUTH 2 TIMES DAILY 60 tablet 0    lisinopril (PRINIVIL;ZESTRIL) 10 MG tablet TAKE 1 TABLET BY MOUTH EVERY DAY 90 tablet 3    clopidogrel (PLAVIX) 75 MG tablet TAKE 1 TABLET BY MOUTH EVERY DAY 90 tablet 3    ranolazine (RANEXA) 500 MG extended release tablet Take 1 tablet by mouth 2 times daily 60 tablet 3    nitroGLYCERIN (NITROSTAT) 0.4 MG SL tablet Place 1 tablet under the tongue every 5 minutes as needed for Chest pain 25 tablet 1    ibuprofen (ADVIL;MOTRIN) 200 MG tablet Take 1,200-1,600 mg by mouth daily      Cholecalciferol (VITAMIN D3) 125 MCG (5000 UT) TABS Take by mouth      Biotin w/ Vitamins C & E 1250-7.5-7.5 MCG-MG-UNT CHEW Take by mouth      aspirin 81 MG EC tablet Take 81 mg by mouth daily      albuterol sulfate HFA (PROVENTIL HFA) 108 (90 Base) MCG/ACT inhaler Inhale 2 puffs into the lungs every 4 hours as needed for Wheezing 1 Inhaler 3    omeprazole 20 MG EC tablet Take by mouth 1 tab bid by pcp      HYDROcodone-acetaminophen (NORCO)  MG per tablet Take 1 tablet by mouth. Lillian Sung Respiratory Therapy Supplies (NEBULIZER/TUBING/MOUTHPIECE) KIT 1 kit by Does not apply route daily as needed (wheezing) Dx: COPD   ICD-10: J44.9 1 kit 1    Nebulizers (COMPRESSOR/NEBULIZER) MISC To be used with albuterol 0.083% - Dx: COPD   ICD-10: J44.9 1 each 0     Current Facility-Administered Medications on File Prior to Visit   Medication Dose Route Frequency Provider Last Rate Last Admin    ticagrelor (BRILINTA) tablet 90 mg  90 mg Oral BID Renetta Pruitt MD           Review of Systems   Constitutional: Positive for activity change. Negative for chills and fever. HENT: Negative for congestion and postnasal drip. Respiratory: Positive for cough and shortness of breath. Cardiovascular: Negative for chest pain and leg swelling. Gastrointestinal: Negative for abdominal pain and constipation. Musculoskeletal: Negative for arthralgias and joint swelling. Skin: Negative for color change and pallor. Allergic/Immunologic: Negative for environmental allergies and food allergies. Psychiatric/Behavioral: Negative for agitation and confusion. Objective:       VITALS:  Pulse 96   LMP 10/15/2012   SpO2 94% Comment: RA at rest     Physical Exam  Vitals reviewed. Constitutional:       General: She is not in acute distress. Appearance: She is well-developed. HENT:      Head: Normocephalic. Mouth/Throat:      Pharynx: No oropharyngeal exudate. Eyes:      General:         Right eye: No discharge. Left eye: No discharge. Conjunctiva/sclera: Conjunctivae normal.      Pupils: Pupils are equal, round, and reactive to light. Neck:      Trachea: No tracheal deviation. Cardiovascular:      Rate and Rhythm: Normal rate and regular rhythm. Heart sounds: No friction rub. Pulmonary:      Effort: Pulmonary effort is normal. No tachypnea, accessory muscle usage or respiratory distress. Breath sounds: No stridor. Wheezing and rhonchi present. No rales. Chest:      Chest wall: No tenderness or crepitus. Abdominal:      General: Bowel sounds are normal. There is no distension. Palpations: Abdomen is soft. Tenderness: There is no abdominal tenderness. Musculoskeletal:         General: Normal range of motion. Cervical back: Normal range of motion and neck supple. Lymphadenopathy:      Cervical: No cervical adenopathy. Skin:     General: Skin is warm and dry. Findings: No erythema. Neurological:      Mental Status: She is alert and oriented to person, place, and time. Psychiatric:         Thought Content:  Thought content normal.       DATA:      Radiology Review: Pertinent images / reports were reviewed as a part of this visit. CT chest done April 2013 reveals the following: There is no suspicious intrathoracic mass or lymphadenopathy. There are findings in the lungs which could represent focal areas of air trapping. If symptoms persist, a high-resolution CT scan could be performed with images during inspiration and expiration. This could confirm the presence of distal airways disease, resulting in focal air trapping. Last PFTs:  None on file. Assessment / Plan:   1. Chronic respiratory failure with hypoxia (HCC)  - Oxygen saturations are 83% with walking into the office on RA  - This improves with rest  - She would benefit from supplemental O2 at 2L NC with activity and sleep  - She would benefit from portable tanks to improve mobility inside and outside the home     2. Chronic obstructive pulmonary disease with acute exacerbation (HCC)  / tobacco abuse  - She has had concurrent flares which is not helped by persistent smoking  - Advised to quit  - Plan repeat Prednisone taper than stay on 10 mg daily until next appointment   - She notes her daughter also has similar symptoms in her 25s, she has never smoked  - Check ALPHA-1-ANTITRYPSIN W/ PHENOTYPE; Future  - As well as CBC WITH AUTO DIFFERENTIAL; Culture, Respiratory; Future  - Add azithromycin (ZITHROMAX Z-LORENA) 250 MG tablet; Take 1 tab on Monday Wednesday and Friday  Dispense: 12 tablet; Refill: 1  - And check Culture with Smear, Acid Fast Bacillius  - CT chest was already ordered by PCP, I've asked her to schedule this   - I've previously ordered PFT which she has not completed   - I've asked her to complete Prednisone before having this done     3.  Cough  - There is an element of chronicity to her cough but it is worse than normal  - She is finishing Doxy now  - Check cultures as above and add MWF Zithromax again as above  - Could consider Daliresp but cost may be an issue    Return in about 4 weeks (around 3/10/2022) for short term follow up of symptoms. Call or RTC sooner if symptoms worsen.     Manuel Pearson MSN APRN-ACNP CCRN

## 2022-02-11 ENCOUNTER — TELEPHONE (OUTPATIENT)
Dept: PULMONOLOGY | Age: 47
End: 2022-02-11

## 2022-02-11 NOTE — TELEPHONE ENCOUNTER
Pt called and said that Vanessa Massed put her on zithromax but she is also on doxycycline and wants to know if she should take both.     Ph # [phone]

## 2022-02-22 RX ORDER — FUROSEMIDE 20 MG/1
TABLET ORAL
Qty: 60 TABLET | Refills: 0 | OUTPATIENT
Start: 2022-02-22

## 2022-02-28 ENCOUNTER — HOSPITAL ENCOUNTER (OUTPATIENT)
Dept: CT IMAGING | Age: 47
Discharge: HOME | End: 2022-02-28
Payer: COMMERCIAL

## 2022-02-28 ENCOUNTER — HOSPITAL ENCOUNTER (OUTPATIENT)
Age: 47
Discharge: HOME | End: 2022-02-28
Payer: COMMERCIAL

## 2022-02-28 DIAGNOSIS — J44.1 CHRONIC OBSTRUCTIVE PULMONARY DISEASE WITH ACUTE EXACERBATION (HCC): ICD-10-CM

## 2022-02-28 DIAGNOSIS — J44.9 OBSTRUCTIVE CHRONIC BRONCHITIS WITHOUT EXACERBATION (HCC): ICD-10-CM

## 2022-02-28 LAB
BASOPHILS ABSOLUTE: 0.1 K/UL (ref 0–0.2)
BASOPHILS RELATIVE PERCENT: 0.5 %
EOSINOPHILS ABSOLUTE: 0.1 K/UL (ref 0–0.6)
EOSINOPHILS RELATIVE PERCENT: 0.4 %
HCT VFR BLD CALC: 50.5 % (ref 36–48)
HEMOGLOBIN: 16.4 G/DL (ref 12–16)
LYMPHOCYTES ABSOLUTE: 2.1 K/UL (ref 1–5.1)
LYMPHOCYTES RELATIVE PERCENT: 13.4 %
MCH RBC QN AUTO: 29.6 PG (ref 26–34)
MCHC RBC AUTO-ENTMCNC: 32.5 G/DL (ref 31–36)
MCV RBC AUTO: 91.2 FL (ref 80–100)
MONOCYTES ABSOLUTE: 1 K/UL (ref 0–1.3)
MONOCYTES RELATIVE PERCENT: 6.3 %
NEUTROPHILS ABSOLUTE: 12.4 K/UL (ref 1.7–7.7)
NEUTROPHILS RELATIVE PERCENT: 79.4 %
PDW BLD-RTO: 18.4 % (ref 12.4–15.4)
PLATELET # BLD: 201 K/UL (ref 135–450)
PMV BLD AUTO: 8.7 FL (ref 5–10.5)
RBC # BLD: 5.54 M/UL (ref 4–5.2)
WBC # BLD: 15.6 K/UL (ref 4–11)

## 2022-02-28 PROCEDURE — 36415 COLL VENOUS BLD VENIPUNCTURE: CPT

## 2022-02-28 PROCEDURE — 82104 ALPHA-1-ANTITRYPSIN PHENO: CPT

## 2022-02-28 PROCEDURE — 6360000004 HC RX CONTRAST MEDICATION: Performed by: INTERNAL MEDICINE

## 2022-02-28 PROCEDURE — 85025 COMPLETE CBC W/AUTO DIFF WBC: CPT

## 2022-02-28 PROCEDURE — 71260 CT THORAX DX C+: CPT

## 2022-02-28 PROCEDURE — 82103 ALPHA-1-ANTITRYPSIN TOTAL: CPT

## 2022-02-28 RX ADMIN — IOPAMIDOL 75 ML: 755 INJECTION, SOLUTION INTRAVENOUS at 17:20

## 2022-03-08 LAB
ALPHA-1 ANTITRYPSIN PHENOTYPE: NORMAL
ALPHA-1 ANTITRYPSIN: 144 MG/DL (ref 90–200)

## 2022-03-10 ENCOUNTER — TELEMEDICINE (OUTPATIENT)
Dept: PULMONOLOGY | Age: 47
End: 2022-03-10
Payer: COMMERCIAL

## 2022-03-10 DIAGNOSIS — J44.9 CHRONIC OBSTRUCTIVE PULMONARY DISEASE, UNSPECIFIED COPD TYPE (HCC): ICD-10-CM

## 2022-03-10 DIAGNOSIS — R05.9 COUGH: ICD-10-CM

## 2022-03-10 DIAGNOSIS — J96.11 CHRONIC RESPIRATORY FAILURE WITH HYPOXIA (HCC): Primary | ICD-10-CM

## 2022-03-10 PROCEDURE — 99214 OFFICE O/P EST MOD 30 MIN: CPT | Performed by: NURSE PRACTITIONER

## 2022-03-10 PROCEDURE — G8427 DOCREV CUR MEDS BY ELIG CLIN: HCPCS | Performed by: NURSE PRACTITIONER

## 2022-03-10 RX ORDER — METOPROLOL TARTRATE 50 MG/1
TABLET, FILM COATED ORAL
COMMUNITY
Start: 2022-03-02

## 2022-03-10 RX ORDER — ROSUVASTATIN CALCIUM 10 MG/1
10 TABLET, COATED ORAL DAILY
COMMUNITY

## 2022-03-10 ASSESSMENT — ENCOUNTER SYMPTOMS
ABDOMINAL PAIN: 0
COLOR CHANGE: 0
CONSTIPATION: 0
COUGH: 1
SHORTNESS OF BREATH: 1

## 2022-03-10 NOTE — PROGRESS NOTES
Hyperlipidemia     Hypertension     Pancreatitis 10/2011    Pneumonia 2008    Psychiatric problem     anxiewty, depression    Ulcer     stomach     Social History:    Social History     Tobacco Use   Smoking Status Current Every Day Smoker    Packs/day: 2.00    Years: 32.00    Pack years: 64.00   Smokeless Tobacco Never Used   Tobacco Comment    started smoking at age 14 / smoked up to 4 p.p.d / now smoking 2 to 3 p.p.d     Current Medications:     Current Outpatient Medications on File Prior to Visit   Medication Sig Dispense Refill    metoprolol tartrate (LOPRESSOR) 50 MG tablet TAKE 0.5 TABLETS (25 MG TOTAL) BY MOUTH 2 TIMES DAILY.  rosuvastatin (CRESTOR) 10 MG tablet Take 10 mg by mouth daily      ipratropium-albuterol (DUONEB) 0.5-2.5 (3) MG/3ML SOLN nebulizer solution Inhale 1 vial into the lungs every 4 hours      Fluticasone-Umeclidin-Vilant (TRELEGY ELLIPTA) 200-62.5-25 MCG/INH AEPB Inhale into the lungs      predniSONE (DELTASONE) 10 MG tablet Take 4 tabs by mouth for 3 days, then 3 tabs for 3 days, then 2 tabs for 3 days, then 1 tab for 3 days, then stop.  30 tablet 0    predniSONE (DELTASONE) 10 MG tablet Take 1 tablet by mouth daily 30 tablet 0    furosemide (LASIX) 20 MG tablet TAKE 1 TABLET BY MOUTH 2 TIMES DAILY 60 tablet 0    lisinopril (PRINIVIL;ZESTRIL) 10 MG tablet TAKE 1 TABLET BY MOUTH EVERY DAY 90 tablet 3    clopidogrel (PLAVIX) 75 MG tablet TAKE 1 TABLET BY MOUTH EVERY DAY 90 tablet 3    ranolazine (RANEXA) 500 MG extended release tablet Take 1 tablet by mouth 2 times daily 60 tablet 3    nitroGLYCERIN (NITROSTAT) 0.4 MG SL tablet Place 1 tablet under the tongue every 5 minutes as needed for Chest pain 25 tablet 1    ibuprofen (ADVIL;MOTRIN) 200 MG tablet Take 1,200-1,600 mg by mouth daily      Cholecalciferol (VITAMIN D3) 125 MCG (5000 UT) TABS Take by mouth      Biotin w/ Vitamins C & E 1250-7.5-7.5 MCG-MG-UNT CHEW Take by mouth      aspirin 81 MG EC tablet Take 81 mg by mouth daily      albuterol sulfate HFA (PROVENTIL HFA) 108 (90 Base) MCG/ACT inhaler Inhale 2 puffs into the lungs every 4 hours as needed for Wheezing 1 Inhaler 3    omeprazole 20 MG EC tablet Take by mouth 1 tab bid by pcp      HYDROcodone-acetaminophen (NORCO)  MG per tablet Take 1 tablet by mouth. Jose Faith Respiratory Therapy Supplies (NEBULIZER/TUBING/MOUTHPIECE) KIT 1 kit by Does not apply route daily as needed (wheezing) Dx: COPD   ICD-10: J44.9 1 kit 1    Nebulizers (COMPRESSOR/NEBULIZER) MISC To be used with albuterol 0.083% - Dx: COPD   ICD-10: J44.9 1 each 0     Current Facility-Administered Medications on File Prior to Visit   Medication Dose Route Frequency Provider Last Rate Last Admin    ticagrelor (BRILINTA) tablet 90 mg  90 mg Oral BID Wash Counts, MD           Review of Systems   Constitutional: Negative for activity change, chills and fever. HENT: Negative for congestion and postnasal drip. Respiratory: Positive for cough and shortness of breath. Cardiovascular: Negative for chest pain and leg swelling. Gastrointestinal: Negative for abdominal pain and constipation. Musculoskeletal: Negative for arthralgias and joint swelling. Skin: Negative for color change and pallor. Allergic/Immunologic: Negative for environmental allergies and food allergies. Psychiatric/Behavioral: Negative for agitation and confusion. Objective:       VITALS:  [facility] 10/15/2012      Physical exam:  No in-personal physical exam was conducted as visit was done via video. The patient was alert and oriented throughout our conversation. She had good coloration. She was not SOB with speaking or otherwise distressed. She had no obvious edema. DATA:      Radiology Review:  Pertinent images / reports were reviewed as a part of this visit. CT chest done Feb 2022:  Mediastinum: Coronary artery calcifications are a marker of atherosclerosis.  There are no enlarged thoracic lymph nodes. Calcified granulomatous disease is noted. Lungs/pleura: The tracheobronchial tree is patent. There is no pneumothorax or pleural effusion. No change in the nonspecific mosaic perfusion pattern with bibasilar atelectasis. While some of the nodules have either completely resolved or remain the same, several new 3-4 mm pulmonary nodules have developed within the right lung. Upper Abdomen: Images of the upper abdomen are unremarkable. Soft Tissues/Bones: Degenerative changes involve the thoracic spine. CT chest done April 2013 reveals the following: There is no suspicious intrathoracic mass or lymphadenopathy. There are findings in the lungs which could represent focal areas of air trapping. If symptoms persist, a high-resolution CT scan could be performed with images during inspiration and expiration. This could confirm the presence of distal airways disease, resulting in focal air trapping. Last PFTs:  None on file. Assessment / Plan:   1. Chronic respiratory failure with hypoxia (HCC)  - Decreased sats at home without O2  - Advised she wear this most of the day and all night  - Get some adhesive to keep in place at night  - She does report poor sleep quality and orthopnea  - I believe she has KATTY  - PSG was ordered in the past which she did not do secondary to COVID, I'd like for this to be completed now     2. Chronic obstructive pulmonary disease, unspecified COPD type ([address]  - Worse now that she has a little cough  - Took last Prednisone today  - Increase nebs to Q4 WA  - Hold on additional Prednisone and check PFT  - Continue Trelegy   - Alpha-1 normal phenotype / range     3. Cough  - Mucinex  - Nasal rinse   - She has had \"nodules\" in her upper airway in the past  - F/u with ENT  - We asked for cultures at last visit, asked again to complete this  - CT showed some new small nodules, ? Infectious     Return in about 4 weeks (around 4/7/2022) for follow up with Dr. Samantha Dietz.  Call or RTC sooner if symptoms worsen.     Isabel Guerin MSN APRN-ACNP CCRN

## 2022-03-17 ENCOUNTER — TELEPHONE (OUTPATIENT)
Dept: PULMONOLOGY | Age: 47
End: 2022-03-17

## 2022-03-17 RX ORDER — DOXYCYCLINE HYCLATE 100 MG
100 TABLET ORAL 2 TIMES DAILY
Qty: 14 TABLET | Refills: 0 | Status: SHIPPED | OUTPATIENT
Start: 2022-03-17 | End: 2022-03-24

## 2022-03-17 RX ORDER — PREDNISONE 10 MG/1
TABLET ORAL
Qty: 30 TABLET | Refills: 0 | Status: SHIPPED | OUTPATIENT
Start: 2022-03-17

## 2022-03-17 NOTE — TELEPHONE ENCOUNTER
Patient sob, productive cough with yellow/green mucus, chest congestion, and sob. She is wanting to know if prednisone and doxy can be called in.

## 2022-04-14 ENCOUNTER — TELEPHONE (OUTPATIENT)
Dept: PULMONOLOGY | Age: 47
End: 2022-04-14

## 2022-04-14 NOTE — TELEPHONE ENCOUNTER
When I called to confirm pt's appt for 3/18/22 she informed me that when she gets up to walk around her O2 drops into the 80's without O2, when she puts the O2 back on it goes back into the 90's. She want's to know if she should go back on prednisone before her appointment. She said her kids were sick and thinks she has what they had.      # [phone]

## 2022-04-14 NOTE — TELEPHONE ENCOUNTER
She should wear her O2 with activity. I'm assuming we mean her appt is for 4/18/22. If so, lets keep that and decide then about Prednisone unless she is significantly worse.     Toan Palmer MSN APRN-ACNP CCRN

## 2022-04-14 NOTE — TELEPHONE ENCOUNTER
Spoke with pt She is going to wear her O2 continuous and use nebulizer 4x's a day and see if her sx's get better. Has appt Monday with Dr Fidel Hayes.

## 2022-04-18 ENCOUNTER — TELEMEDICINE (OUTPATIENT)
Dept: PULMONOLOGY | Age: 47
End: 2022-04-18
Payer: COMMERCIAL

## 2022-04-18 DIAGNOSIS — J96.11 CHRONIC HYPOXEMIC RESPIRATORY FAILURE (HCC): ICD-10-CM

## 2022-04-18 DIAGNOSIS — R06.02 SOB (SHORTNESS OF BREATH): Primary | ICD-10-CM

## 2022-04-18 DIAGNOSIS — G47.33 OBSTRUCTIVE SLEEP APNEA: ICD-10-CM

## 2022-04-18 DIAGNOSIS — R91.1 PULMONARY NODULE: ICD-10-CM

## 2022-04-18 DIAGNOSIS — E66.01 MORBID OBESITY, UNSPECIFIED OBESITY TYPE (HCC): ICD-10-CM

## 2022-04-18 DIAGNOSIS — J44.9 COPD, SEVERITY TO BE DETERMINED (HCC): ICD-10-CM

## 2022-04-18 PROCEDURE — 4004F PT TOBACCO SCREEN RCVD TLK: CPT | Performed by: INTERNAL MEDICINE

## 2022-04-18 PROCEDURE — 99214 OFFICE O/P EST MOD 30 MIN: CPT | Performed by: INTERNAL MEDICINE

## 2022-04-18 PROCEDURE — 3023F SPIROM DOC REV: CPT | Performed by: INTERNAL MEDICINE

## 2022-04-18 PROCEDURE — G8427 DOCREV CUR MEDS BY ELIG CLIN: HCPCS | Performed by: INTERNAL MEDICINE

## 2022-04-18 PROCEDURE — G8417 CALC BMI ABV UP PARAM F/U: HCPCS | Performed by: INTERNAL MEDICINE

## 2022-04-18 RX ORDER — PREDNISONE 10 MG/1
TABLET ORAL
Qty: 30 TABLET | Refills: 0 | Status: SHIPPED | OUTPATIENT
Start: 2022-04-18 | End: 2022-04-28

## 2022-04-18 NOTE — PROGRESS NOTES
[facility]  Follow Up Note  Alyson Martinez MD       Reji Redman   YOB: 1975    Date of Visit:  4/18/2022    Assessment/Plan:  1. SOB (shortness of breath)  Worse    She wants steroids  Give her a taper    2. COPD, severity to be determined [facility])  She has never had PFT    Medication Management:  The patient benefits from the medical regimen and reports no complications. Trelegy 200  Duoneb QID    She had normal A-1-a    3. Chronic hypoxemic respiratory failure (HCC)  Now on 4 LPM    4. Obstructive sleep apnea  Has never had PSG    Schedule PSG    5. Pulmonary nodule  Last CT showed new nodule  Repeat CT chest    6. Morbid obesity, unspecified obesity type ([address]  Contributes to her symptoms. Chief Complaint   Patient presents with    Shortness of Breath     4 week When taking a shower with her O2 sat's drop below 90% but then feels her Oximeter might be wrong     Oral Swelling     Dysphonia  in past Feels she is having this again        HPI  The patient presents with a chief complaint of moderate shortness of breath related to COPD of many years duration. He has mild associated cough. Exertion is a modifying factor. She has never had a PFT. She also has \"sleep apnea\" but never had PSG or CPAP. She is O2 dependent and wears 4 LPM O2. I have seen her. She was a Dr Madeline Pina patient previously. She has had a couple video visits with [place]. She had surgery on her neck because she was breathing through a \"coffe stirrer\". She has not followed up with ENT. A lot of her complaints alre upper airway in nature. She smokes up to 2 ppd. She is asking for steroids because they help her breathing. Review of Systems  No Chest pain, Nausea or vomiting reported      History  I have reviewed past medical, surgical, social and family history. This is documented elsewhere in the medical record.      Physical Exam:  Well developed, well nourished  Alert and oriented  Sclera is clear  No cervical adenopathy  No JVD. Chest examination is clear. Cardiac examination reveals regular rate and rhythm without murmur, gallop or rub. The abdomen is soft, nontender and nondistended. There is no clubbing, cyanosis or edema of the extremities. There is no obvious skin rash. No focal neuro deficicts  Normal mood and affect    Allergies   Allergen Reactions    Sulfa Antibiotics Shortness Of Breath    Tramadol      Breathing difficulties    Ultracet [Tramadol-Acetaminophen]      Rash      Bactrim Rash    Cephalexin Rash    Ketorolac Tromethamine Rash    Levofloxacin Rash     Prior to Visit Medications    Medication Sig Taking? Authorizing Provider   predniSONE (DELTASONE) 10 MG tablet 40 mg for 3 days 30 mg for 3 days 20 mg for 3 days 10 mg for 3 days  YOCASTA Stevens CNP   metoprolol tartrate (LOPRESSOR) 50 MG tablet TAKE 0.5 TABLETS (25 MG TOTAL) BY MOUTH 2 TIMES DAILY. Historical Provider, MD   rosuvastatin (CRESTOR) 10 MG tablet Take 10 mg by mouth daily  Historical Provider, MD   ipratropium-albuterol (DUONEB) 0.5-2.5 (3) MG/3ML SOLN nebulizer solution Inhale 1 vial into the lungs every 4 hours  Historical Provider, MD   Fluticasone-Umeclidin-Vilant ([place]) 005-81.6-27 MCG/INH AEPB Inhale into the lungs  Historical Provider, MD   predniSONE (DELTASONE) 10 MG tablet Take 4 tabs by mouth for 3 days, then 3 tabs for 3 days, then 2 tabs for 3 days, then 1 tab for 3 days, then stop.   YOCASTA Stevens CNP   furosemide (LASIX) 20 MG tablet TAKE 1 TABLET BY MOUTH 2 TIMES DAILY  YOCASTA Wright CNP   lisinopril (PRINIVIL;ZESTRIL) 10 MG tablet TAKE 1 TABLET BY MOUTH EVERY DAY  Sanjeev Mares MD   clopidogrel (PLAVIX) 75 MG tablet TAKE 1 TABLET BY MOUTH EVERY DAY  Sanjeev Mares MD   ranolazine (RANEXA) 500 MG extended release tablet Take 1 tablet by mouth 2 times daily  YOCASTA Wright CNP   nitroGLYCERIN (NITROSTAT) 0.4 MG SL tablet Place 1 tablet under the tongue every 5 minutes as needed for Chest pain  Venkata Giraldo MD   ibuprofen (ADVIL;MOTRIN) 200 MG tablet Take 1,200-1,600 mg by mouth daily  Historical Provider, MD   Cholecalciferol (VITAMIN D3) 125 MCG (5000 UT) TABS Take by mouth  Historical Provider, MD   Biotin w/ Vitamins C & E 1250-7.5-7.5 MCG-MG-UNT CHEW Take by mouth  Historical Provider, MD   aspirin 81 MG EC tablet Take 81 mg by mouth daily  Historical Provider, MD   albuterol sulfate HFA (PROVENTIL HFA) 108 (90 Base) MCG/ACT inhaler Inhale 2 puffs into the lungs every 4 hours as needed for Wheezing  Laren Pipes Eulalio, YOCASTA - CNP   omeprazole 20 MG EC tablet Take by mouth 1 tab bid by pcp  Historical Provider, MD   HYDROcodone-acetaminophen (NORCO)  MG per tablet Take 1 tablet by mouth. .  Historical Provider, MD   Respiratory Therapy Supplies (NEBULIZER/TUBING/MOUTHPIECE) KIT 1 kit by Does not apply route daily as needed (wheezing) Dx: COPD   ICD-10: J44.9  Rafy Verde MD   Nebulizers (COMPRESSOR/NEBULIZER) MISC To be used with albuterol 0.083% - Dx: COPD   ICD-10: J44.9  Rafy Verde MD       There were no vitals filed for this visit. There is no height or weight on file to calculate BMI.      Wt Readings from Last 3 Encounters:   04/26/21 240 lb (108.9 kg)   03/26/21 240 lb 9.6 oz (109.1 kg)   03/13/20 247 lb (112 kg)     BP Readings from Last 3 Encounters:   04/26/21 (!) 145/80   03/26/21 128/78   04/23/20 130/70        Social History     Tobacco Use   Smoking Status Current Every Day Smoker    Packs/day: 2.00    Years: 32.00    Pack years: 64.00   Smokeless Tobacco Never Used   Tobacco Comment    started smoking at age 14 / smoked up to 4 p.p.d / now smoking 2 to 3 p.p.d

## 2022-04-21 ENCOUNTER — TELEPHONE (OUTPATIENT)
Dept: PULMONOLOGY | Age: 47
End: 2022-04-21

## 2022-04-21 NOTE — TELEPHONE ENCOUNTER
Patient called stating she saw Dr. Claudia Tubbs (Otolaryngology) and he found significant thrush on laryngeal exam. She is being treated with Diflucan x 14 days. She states she tried Nystatin x 2 with no improvement of the thrush. She was told to contact Dr. Rossana Carrillo to [facility] if there was any additional testing or treatment needed since it could spread to the lungs\". Dr. Rossana Carrillo, please advise.

## 2022-04-27 RX ORDER — FLUTICASONE FUROATE, UMECLIDINIUM BROMIDE AND VILANTEROL TRIFENATATE 100; 62.5; 25 UG/1; UG/1; UG/1
1 POWDER RESPIRATORY (INHALATION) DAILY
Qty: 1 EACH | Refills: 6 | Status: SHIPPED | OUTPATIENT
Start: 2022-04-27

## 2022-05-03 ENCOUNTER — TELEPHONE (OUTPATIENT)
Dept: SLEEP CENTER | Age: 47
End: 2022-05-03

## 2022-05-10 ENCOUNTER — TELEPHONE (OUTPATIENT)
Dept: PULMONOLOGY | Age: 47
End: 2022-05-10

## 2022-05-10 NOTE — TELEPHONE ENCOUNTER
Called to do peer to peer The physician I spoke with said that due to the pulmonary nodules being < 6mm CT Scan not warranted until 12 months.  Can call back and have you talk with them if you would like  Did give her pt's 2 p.p.d information as well but that did not help

## 2022-05-10 NOTE — TELEPHONE ENCOUNTER
Bella with the Pre Auth Dept called and said they need a pier to pier to be done for pt ct scan.     Said to call [phone] to do pier to pier  Case # [id number]

## 2022-05-24 ENCOUNTER — TELEPHONE (OUTPATIENT)
Dept: SLEEP CENTER | Age: 47
End: 2022-05-24

## 2022-05-24 ENCOUNTER — TELEPHONE (OUTPATIENT)
Dept: PULMONOLOGY | Age: 47
End: 2022-05-24

## 2022-05-24 NOTE — TELEPHONE ENCOUNTER
Pt called and said she has thrush again. She said that Dr. Flannery Labs her ENT said that she is getting down in her esophagus, it's not reaching her mouth, he put her back on Diflucan. Wants to see what Dr Juanita Charlton wants her to do about the Trelegy.     Would also like to get some prednisone to help with the breathing    [place] # [phone]

## 2022-05-24 NOTE — TELEPHONE ENCOUNTER
Pt informed will ask Dr Alberto Lowe tomorrow She stated that her breathing issues is from the yeast and thought Prednisone would help with this. ENT told her that everything is swollen/inflamed I told her to stop the Trelegy.

## 2022-05-24 NOTE — TELEPHONE ENCOUNTER
Returned pt's call to reschedule an hst that she had at    No showed that appt. Left vm for the pt to return our call.

## 2022-05-27 NOTE — TELEPHONE ENCOUNTER
Tried to call pt back and got a recording that my call could not be completed at this time Will try again later

## 2022-06-20 ENCOUNTER — TELEPHONE (OUTPATIENT)
Dept: PULMONOLOGY | Age: 47
End: 2022-06-20

## 2022-06-20 DIAGNOSIS — J44.9 COPD, SEVERITY TO BE DETERMINED (HCC): Primary | ICD-10-CM

## 2022-06-20 RX ORDER — UMECLIDINIUM BROMIDE AND VILANTEROL TRIFENATATE 62.5; 25 UG/1; UG/1
1 POWDER RESPIRATORY (INHALATION) DAILY
Qty: 60 EACH | Refills: 3 | Status: SHIPPED | OUTPATIENT
Start: 2022-06-20

## 2022-06-20 RX ORDER — DOXYCYCLINE HYCLATE 100 MG
100 TABLET ORAL 2 TIMES DAILY
Qty: 20 TABLET | Refills: 0 | Status: SHIPPED | OUTPATIENT
Start: 2022-06-20 | End: 2022-06-30

## 2022-06-20 NOTE — TELEPHONE ENCOUNTER
Patient called c/o productive cough with yellow phlegm, shortness of breath and PND. She states she is using her oxygen and her Sats stay 94-95%. She took the oxygen off and walked to bathroom and it was 68% when she returned. She states she has not been doing nebulizer treatments. She is taking Mucinex 400mg Q 4 hours. She states she completed treatment for thrush in throat 2 weeks ago and seems to be doing well with that. She states the Trelegy was d/c so she has not been using any inhalers. Patient is just concerned that her O2 sats drop when off the oxygen and her cough is getting worse again. Dr. Sherren Prayer, please advise.

## 2022-06-20 NOTE — TELEPHONE ENCOUNTER
Spoke with patient. Informed her that Dr. Sanjana Coronel approved Doxycycline and Anoro for her. Verified allergies.  Rx's sent to [facility].

## 2022-10-04 ENCOUNTER — TELEPHONE (OUTPATIENT)
Dept: SLEEP CENTER | Age: 47
End: 2022-10-04

## 2022-10-04 NOTE — TELEPHONE ENCOUNTER
Called to schedule a psg per Naina. Returned pt's call on [phone] - left vm. Tried the 68 [phone] number from chart - was not available, could not leave a msg.      [employer]

## 2022-11-02 ENCOUNTER — TELEPHONE (OUTPATIENT)
Dept: PULMONOLOGY | Age: 47
End: 2022-11-02

## 2022-11-02 NOTE — TELEPHONE ENCOUNTER
Spoke with pt She will retest for COVID as directed Called in prescriptions for her but if her COVID comes back positive she will call and let us know

## 2022-11-02 NOTE — TELEPHONE ENCOUNTER
Pt called back and said that the covid test was inconclusive and to wait 24 hours and test again.     Ph # [phone]

## 2022-11-02 NOTE — TELEPHONE ENCOUNTER
Pt had appointment for today, called her last night because she had not had her reqested testing done and she stated she was sick Asked for her to do COVID test and this was the outcome  She has terrible cough congestion tightness in chest no fever  These are her allergies:  Sulfa Antibiotics High Shortness Of Breath    Tramadol Not Specified  Breathing difficulties   Ultracet [tramadol-acetaminophen] Not Specified  Rash   Bactrim Low Rash    Cephalexin Low Rash    Ketorolac Tromethamine Low Rash    Levofloxacin

## 2022-11-11 RX ORDER — LISINOPRIL 10 MG/1
TABLET ORAL
Qty: 90 TABLET | Refills: 3 | OUTPATIENT
Start: 2022-11-11

## 2022-11-11 RX ORDER — CLOPIDOGREL BISULFATE 75 MG/1
TABLET ORAL
Qty: 90 TABLET | Refills: 3 | OUTPATIENT
Start: 2022-11-11

## 2022-12-22 ENCOUNTER — TELEPHONE (OUTPATIENT)
Dept: PULMONOLOGY | Age: 47
End: 2022-12-22

## 2022-12-22 RX ORDER — UMECLIDINIUM BROMIDE AND VILANTEROL TRIFENATATE 62.5; 25 UG/1; UG/1
1 POWDER RESPIRATORY (INHALATION) DAILY
Qty: 1 EACH | Refills: 5 | Status: SHIPPED | OUTPATIENT
Start: 2022-12-22

## 2022-12-22 NOTE — TELEPHONE ENCOUNTER
Patient called and needs a refill on medication.     umeclidinium-vilanterol (ANORO ELLIPTA) 62.5-25 MCG/INH AEPB inhaler     [facility]    PH: [phone]

## 2023-06-06 ENCOUNTER — TELEPHONE (OUTPATIENT)
Dept: PULMONOLOGY | Age: 48
End: 2023-06-06

## 2023-06-06 DIAGNOSIS — R06.02 SOB (SHORTNESS OF BREATH): ICD-10-CM

## 2023-06-06 DIAGNOSIS — J44.9 COPD, SEVERITY TO BE DETERMINED (HCC): Primary | ICD-10-CM

## 2023-06-29 ENCOUNTER — OFFICE VISIT (OUTPATIENT)
Dept: PULMONOLOGY | Age: 48
End: 2023-06-29
Payer: COMMERCIAL

## 2023-06-29 ENCOUNTER — HOSPITAL ENCOUNTER (OUTPATIENT)
Age: 48
Discharge: HOME | End: 2023-06-29
Payer: COMMERCIAL

## 2023-06-29 VITALS — HEART RATE: 67 BPM | OXYGEN SATURATION: 92 %

## 2023-06-29 DIAGNOSIS — J96.11 CHRONIC RESPIRATORY FAILURE WITH HYPOXIA (HCC): ICD-10-CM

## 2023-06-29 DIAGNOSIS — J44.9 COPD, SEVERITY TO BE DETERMINED (HCC): ICD-10-CM

## 2023-06-29 DIAGNOSIS — Z91.09 ENVIRONMENTAL ALLERGIES: Primary | ICD-10-CM

## 2023-06-29 DIAGNOSIS — R91.8 PULMONARY NODULES: ICD-10-CM

## 2023-06-29 DIAGNOSIS — Z71.6 TOBACCO ABUSE COUNSELING: ICD-10-CM

## 2023-06-29 LAB
25(OH)D3 SERPL-MCNC: 19.5 NG/ML
ALBUMIN SERPL-MCNC: 4 G/DL (ref 3.4–5)
ALBUMIN/GLOB SERPL: 1.3 {RATIO} (ref 1.1–2.2)
ALP SERPL-CCNC: 113 U/L (ref 40–129)
ALT SERPL-CCNC: 15 U/L (ref 10–40)
ANION GAP SERPL CALCULATED.3IONS-SCNC: 10 MMOL/L (ref 3–16)
AST SERPL-CCNC: 24 U/L (ref 15–37)
BILIRUB SERPL-MCNC: <0.2 MG/DL (ref 0–1)
BUN SERPL-MCNC: 14 MG/DL (ref 7–20)
CALCIUM SERPL-MCNC: 9.3 MG/DL (ref 8.3–10.6)
CHLORIDE SERPL-SCNC: 98 MMOL/L (ref 99–110)
CO2 SERPL-SCNC: 30 MMOL/L (ref 21–32)
CREAT SERPL-MCNC: 0.6 MG/DL (ref 0.6–1.1)
DEPRECATED RDW RBC AUTO: 19.3 % (ref 12.4–15.4)
FERRITIN SERPL IA-MCNC: 24 NG/ML (ref 15–150)
GFR SERPLBLD CREATININE-BSD FMLA CKD-EPI: >60 ML/MIN/{1.73_M2}
GLUCOSE SERPL-MCNC: 115 MG/DL (ref 70–99)
HCT VFR BLD AUTO: 34.4 % (ref 36–48)
HGB BLD-MCNC: 10.6 G/DL (ref 12–16)
IRON SATN MFR SERPL: 6 % (ref 15–50)
IRON SERPL-MCNC: 25 UG/DL (ref 37–145)
MCH RBC QN AUTO: 22.3 PG (ref 26–34)
MCHC RBC AUTO-ENTMCNC: 30.8 G/DL (ref 31–36)
MCV RBC AUTO: 72.3 FL (ref 80–100)
PLATELET # BLD AUTO: 202 K/UL (ref 135–450)
PMV BLD AUTO: 9.6 FL (ref 5–10.5)
POTASSIUM SERPL-SCNC: 4.7 MMOL/L (ref 3.5–5.1)
PROT SERPL-MCNC: 7.2 G/DL (ref 6.4–8.2)
RBC # BLD AUTO: 4.75 M/UL (ref 4–5.2)
SODIUM SERPL-SCNC: 138 MMOL/L (ref 136–145)
TIBC SERPL-MCNC: 410 UG/DL (ref 260–445)
VIT B12 SERPL-MCNC: 1221 PG/ML (ref 211–911)
WBC # BLD AUTO: 11.6 K/UL (ref 4–11)

## 2023-06-29 PROCEDURE — 82785 ASSAY OF IGE: CPT

## 2023-06-29 PROCEDURE — 83540 ASSAY OF IRON: CPT

## 2023-06-29 PROCEDURE — G8427 DOCREV CUR MEDS BY ELIG CLIN: HCPCS | Performed by: NURSE PRACTITIONER

## 2023-06-29 PROCEDURE — G8421 BMI NOT CALCULATED: HCPCS | Performed by: NURSE PRACTITIONER

## 2023-06-29 PROCEDURE — 36415 COLL VENOUS BLD VENIPUNCTURE: CPT

## 2023-06-29 PROCEDURE — 80053 COMPREHEN METABOLIC PANEL: CPT

## 2023-06-29 PROCEDURE — 99214 OFFICE O/P EST MOD 30 MIN: CPT | Performed by: NURSE PRACTITIONER

## 2023-06-29 PROCEDURE — 82607 VITAMIN B-12: CPT

## 2023-06-29 PROCEDURE — 4004F PT TOBACCO SCREEN RCVD TLK: CPT | Performed by: NURSE PRACTITIONER

## 2023-06-29 PROCEDURE — 83550 IRON BINDING TEST: CPT

## 2023-06-29 PROCEDURE — 86003 ALLG SPEC IGE CRUDE XTRC EA: CPT

## 2023-06-29 PROCEDURE — 3023F SPIROM DOC REV: CPT | Performed by: NURSE PRACTITIONER

## 2023-06-29 PROCEDURE — 82728 ASSAY OF FERRITIN: CPT

## 2023-06-29 PROCEDURE — 85027 COMPLETE CBC AUTOMATED: CPT

## 2023-06-29 PROCEDURE — 82306 VITAMIN D 25 HYDROXY: CPT

## 2023-06-29 RX ORDER — FLUTICASONE PROPIONATE 50 MCG
2 SPRAY, SUSPENSION (ML) NASAL DAILY
Qty: 16 G | Refills: 5 | Status: SHIPPED | OUTPATIENT
Start: 2023-06-29

## 2023-06-29 RX ORDER — CETIRIZINE HYDROCHLORIDE 10 MG/1
10 TABLET ORAL DAILY
Qty: 30 TABLET | Refills: 0 | Status: SHIPPED | OUTPATIENT
Start: 2023-06-29 | End: 2023-07-29

## 2023-06-29 RX ORDER — UMECLIDINIUM BROMIDE AND VILANTEROL TRIFENATATE 62.5; 25 UG/1; UG/1
1 POWDER RESPIRATORY (INHALATION) DAILY
Qty: 1 EACH | Refills: 5 | Status: SHIPPED | OUTPATIENT
Start: 2023-06-29

## 2023-06-29 RX ORDER — IPRATROPIUM BROMIDE AND ALBUTEROL SULFATE 2.5; .5 MG/3ML; MG/3ML
1 SOLUTION RESPIRATORY (INHALATION) EVERY 4 HOURS
Qty: 360 ML | Refills: 5 | Status: SHIPPED | OUTPATIENT
Start: 2023-06-29

## 2023-06-29 ASSESSMENT — ENCOUNTER SYMPTOMS
COUGH: 1
CONSTIPATION: 0
SINUS PRESSURE: 1
SHORTNESS OF BREATH: 1
COLOR CHANGE: 0
ABDOMINAL PAIN: 0

## 2023-07-09 LAB
A ALTERNATA IGE QN: <0.1 KU/L (ref 0–0.34)
A FUMIGATUS IGE QN: <0.1 KU/L (ref 0–0.34)
AMER SYCAMORE IGE QN: <0.1 KU/L (ref 0–0.34)
BERMUDA GRASS IGE QN: <0.1 KU/L (ref 0–0.34)
BOXELDER IGE QN: <0.1 KU/L (ref 0–0.34)
C SPHAEROSPERMUM IGE QN: <0.1 KUL/L (ref 0–0.34)
CALIF WALNUT IGE QN: <0.1 KU/L (ref 0–0.34)
CAT DANDER IGE QN: <0.1 KU/L (ref 0–0.34)
CMN PIGWEED IGE QN: <0.1 KU/L (ref 0–0.34)
COMMON RAGWEED IGE QN: <0.1 KU/L (ref 0–0.34)
COTTONWOOD IGE QN: <0.1 KU/L (ref 0–0.34)
D FARINAE IGE QN: <0.1 KU/L (ref 0–0.34)
D PTERONYSS IGE QN: <0.1 KU/L (ref 0–0.34)
DOG DANDER IGE QN: <0.1 KU/L (ref 0–0.34)
IGE SERPL-ACNC: 6 IU/ML
M RACEMOSUS IGE QN: <0.1 KU/L (ref 0–0.34)
MOUSE EPITH IGE QN: <0.1 KU/L (ref 0–0.34)
P NOTATUM IGE QN: <0.1 KU/L (ref 0–0.34)
PECAN/HICK TREE IGE QN: <0.1 KU/L (ref 0–0.34)
RED CEDAR IGE QN: <0.1 KU/L (ref 0–0.34)
ROACH IGE QN: <0.1 KU/L (ref 0–0.34)
SALTWORT IGE QN: <0.1 KU/L (ref 0–0.34)
SHEEP SORREL IGE QN: <0.1 KU/L (ref 0–0.34)
SILVER BIRCH IGE QN: <0.1 KU/L (ref 0–0.34)
TIMOTHY IGE QN: <0.1 KU/L (ref 0–0.34)
WHITE ASH IGE QN: <0.1 KU/L (ref 0–0.34)
WHITE ELM IGE QN: <0.1 KU/L (ref 0–0.34)
WHITE MULBERRY IGE QN: <0.1 KU/L (ref 0–0.34)
WHITE OAK IGE QN: <0.1 KU/L (ref 0–0.34)

## 2023-08-01 DIAGNOSIS — J44.9 COPD, SEVERITY TO BE DETERMINED (HCC): ICD-10-CM

## 2023-08-01 RX ORDER — UMECLIDINIUM BROMIDE AND VILANTEROL TRIFENATATE 62.5; 25 UG/1; UG/1
POWDER RESPIRATORY (INHALATION)
Qty: 60 EACH | Refills: 5 | Status: SHIPPED | OUTPATIENT
Start: 2023-08-01

## 2023-08-08 ENCOUNTER — PATIENT MESSAGE (OUTPATIENT)
Dept: PULMONOLOGY | Age: 48
End: 2023-08-08

## 2023-08-09 NOTE — TELEPHONE ENCOUNTER
From: Cathleen Auguste  To: Adelfo Tsai  Sent: 8/8/2023 3:22 PM EDT  Subject: Breathing is tight. Hello, I am messaging because my breathing is a little tight, I was wanting to see if Zac Nowak could call me in a step down on Prednisone? Please let me know.  Thank you,  Barbara Steele

## 2023-08-16 RX ORDER — CETIRIZINE HYDROCHLORIDE 10 MG/1
10 TABLET ORAL DAILY
Qty: 90 TABLET | Refills: 3 | Status: SHIPPED | OUTPATIENT
Start: 2023-08-16 | End: 2024-08-10

## 2023-08-21 RX ORDER — CETIRIZINE HYDROCHLORIDE 10 MG/1
10 TABLET ORAL DAILY
Qty: 30 TABLET | Refills: 0 | Status: SHIPPED | OUTPATIENT
Start: 2023-08-21 | End: 2023-09-20

## 2023-11-13 ENCOUNTER — TELEPHONE (OUTPATIENT)
Dept: PULMONOLOGY | Age: 48
End: 2023-11-13

## 2023-11-13 NOTE — TELEPHONE ENCOUNTER
Pt called and said she started on 11/10/23 coughing up yellow mucus, has had chills, hot and cold, no sore throat. Has not tested for covid.     [facility] [address].    [facility] # [phone]

## 2023-11-13 NOTE — TELEPHONE ENCOUNTER
Pt had been coughing up yellow phlegm since 11/10/23. No sore throat. Not tested for flu or covid.  Would like to know if could have something called in to    [facility]arshan [address], [address] [facility] [address] [facility]

## 2023-11-14 RX ORDER — DOXYCYCLINE HYCLATE 100 MG/1
100 CAPSULE ORAL 2 TIMES DAILY
Qty: 20 CAPSULE | Refills: 0 | Status: SHIPPED | OUTPATIENT
Start: 2023-11-14 | End: 2023-11-24

## 2023-11-14 RX ORDER — PREDNISONE 10 MG/1
TABLET ORAL
Qty: 30 TABLET | Refills: 0 | Status: SHIPPED | OUTPATIENT
Start: 2023-11-14 | End: 2023-11-24

## 2024-03-10 NOTE — H&P
-- Message is from Engagement Center Operations (ECO) --    Called and left voicemail to inform the patient the symptom they entered was not detailed enough for their appointment at the [facility].      ECO AGENT: If the patient calls back, use Emergent Symptom List to screen the symptom and then follow the outcome.    ECO AGENT: Cancel the [facility] appointment if it is confirmed that symptoms are not in-scope.  
mentioned in A/P  PHYSICAL EXAM     There were no vitals filed for this visit.    Gen Alert, coop, no distress Heart  RRR, no MRG   Head NC, AT, no abnorm Abd  Soft, NT, +BS, no mass, no OM   Eyes PER, conj/corn clear Ext  Ext nl, AT, no C/C/E   Nose Nares nl, no drain, NT Pulse 2+ and symmetric   Throat Lips, mucosa, tongue nl Skin Col/text/turg nl, no vis rash/les   Neck S/S, TM, NT, no bruit/JVD Psych Nl mood and affect   Lung CTA-B, unlabored, no DTP Lymph   No cervical or axillary LA   Ch wall NT, no deform Neuro  Nl gross M/S exam     ASSESSMENT AND PLAN     ~CAD   Date EF Results   Sx   Chest pain, unstable angina   Hx 1/16  SVPCI   [facility] 11/10  12/11  7/13  1/16  4/17  3/20     55%  55%  55%  60% PCI of LAD (Jenwendy)  PCI of RCA (Jenwendy)  Patent stents to RCA and LAD, 70% twig circ (Vish)  PCI of RCA (Pepe)  40% midLAD, 90% midCx, 30% prox RCA Pilar Chandra)  PCI of distalRCA into PLB Pilar Chandra)   MPI 10/12  5/18   66% Apical scar vs artifact  Small minimally reversible inferoapical wall defect c/w artifact   STTE 4/14 55% Non diagnostic study   TTE 4/14 55% Mild TR 37   Plan   Unstable angina   ~HTN  Today BP Controlled   Counseling Counseled on diet/salt, exercise and ideal body weight   Plan Continue current meds at doses above   ~Hyperchol  Goal LDL <70   Counseling Counseled on diet, exercise and ideal body weight   Plan PCP liver/lipid surveillance, continue current meds at doses above  Encouraged compliance with statin   10/19 HDL:35, LDL:126  ~Tobacco  Status Smoking 3ppd   Counseling Counseled on risks, cessation therapies discussed   Plan Continued avoidance of first and second hand smoke

## 2024-03-21 DIAGNOSIS — J44.9 COPD, SEVERITY TO BE DETERMINED (HCC): ICD-10-CM

## 2024-03-21 RX ORDER — IPRATROPIUM BROMIDE AND ALBUTEROL SULFATE 2.5; .5 MG/3ML; MG/3ML
1 SOLUTION RESPIRATORY (INHALATION) EVERY 4 HOURS
Qty: 360 ML | Refills: 0 | Status: SHIPPED | OUTPATIENT
Start: 2024-03-21

## 2024-03-21 NOTE — TELEPHONE ENCOUNTER
Last appointment:  6/29/2023    Next appointment:  Visit date not found    LM on pt's VM to call office to schedule appointment

## 2024-03-25 DIAGNOSIS — J44.9 COPD, SEVERITY TO BE DETERMINED (HCC): ICD-10-CM

## 2024-03-25 RX ORDER — IPRATROPIUM BROMIDE AND ALBUTEROL SULFATE 2.5; .5 MG/3ML; MG/3ML
1 SOLUTION RESPIRATORY (INHALATION) EVERY 4 HOURS
Qty: 360 ML | Refills: 0 | Status: SHIPPED | OUTPATIENT
Start: 2024-03-25

## 2024-05-16 ENCOUNTER — PATIENT MESSAGE (OUTPATIENT)
Dept: PULMONOLOGY | Age: 49
End: 2024-05-16

## 2024-05-16 RX ORDER — PREDNISONE 20 MG/1
40 TABLET ORAL DAILY
Qty: 10 TABLET | Refills: 0 | Status: SHIPPED | OUTPATIENT
Start: 2024-05-16 | End: 2024-05-21

## 2024-05-16 RX ORDER — AZITHROMYCIN 250 MG/1
TABLET, FILM COATED ORAL
Qty: 6 TABLET | Refills: 0 | Status: SHIPPED | OUTPATIENT
Start: 2024-05-16 | End: 2024-05-26

## 2024-05-16 NOTE — TELEPHONE ENCOUNTER
Zpak and Prednisone burst sent to pharmacy on file. Apologies for the delayed response, I've been out of the office. Recommend follow up appointment to further evaluate symptoms since this is the second round of antibiotics since last appointment in June of 2023.    Joie Tsai MSN APRN-ACNP CCRN

## 2024-10-23 DIAGNOSIS — J44.9 COPD, SEVERITY TO BE DETERMINED (HCC): Primary | ICD-10-CM

## 2024-10-23 DIAGNOSIS — R06.02 SOB (SHORTNESS OF BREATH): ICD-10-CM

## 2024-10-23 DIAGNOSIS — R91.8 PULMONARY NODULES: Primary | ICD-10-CM

## 2024-11-04 ENCOUNTER — TELEPHONE (OUTPATIENT)
Dept: PULMONOLOGY | Age: 49
End: 2024-11-04

## 2024-11-04 NOTE — TELEPHONE ENCOUNTER
Coughing up yellow, no fever, no coughing up blood.  When patient takes off her O2 she has exacerbation .  Patient is not on a control inhaler. Only on Prednisone taper, 80, 40, 20, 10.

## 2024-11-05 ENCOUNTER — OFFICE VISIT (OUTPATIENT)
Dept: PULMONOLOGY | Age: 49
End: 2024-11-05
Payer: COMMERCIAL

## 2024-11-05 VITALS
HEIGHT: 61 IN | BODY MASS INDEX: 47.05 KG/M2 | HEART RATE: 88 BPM | SYSTOLIC BLOOD PRESSURE: 136 MMHG | WEIGHT: 249.2 LBS | DIASTOLIC BLOOD PRESSURE: 84 MMHG | OXYGEN SATURATION: 87 %

## 2024-11-05 DIAGNOSIS — G47.33 OBSTRUCTIVE SLEEP APNEA: ICD-10-CM

## 2024-11-05 DIAGNOSIS — R91.1 PULMONARY NODULE: ICD-10-CM

## 2024-11-05 DIAGNOSIS — E66.01 MORBID OBESITY, UNSPECIFIED OBESITY TYPE: ICD-10-CM

## 2024-11-05 DIAGNOSIS — J96.11 CHRONIC HYPOXEMIC RESPIRATORY FAILURE: ICD-10-CM

## 2024-11-05 DIAGNOSIS — K21.9 CHRONIC GERD: ICD-10-CM

## 2024-11-05 DIAGNOSIS — J44.9 COPD, SEVERITY TO BE DETERMINED (HCC): Primary | ICD-10-CM

## 2024-11-05 DIAGNOSIS — Z72.0 TOBACCO ABUSE: ICD-10-CM

## 2024-11-05 PROCEDURE — G8484 FLU IMMUNIZE NO ADMIN: HCPCS | Performed by: INTERNAL MEDICINE

## 2024-11-05 PROCEDURE — 3017F COLORECTAL CA SCREEN DOC REV: CPT | Performed by: INTERNAL MEDICINE

## 2024-11-05 PROCEDURE — 4004F PT TOBACCO SCREEN RCVD TLK: CPT | Performed by: INTERNAL MEDICINE

## 2024-11-05 PROCEDURE — 99214 OFFICE O/P EST MOD 30 MIN: CPT | Performed by: INTERNAL MEDICINE

## 2024-11-05 PROCEDURE — 3075F SYST BP GE 130 - 139MM HG: CPT | Performed by: INTERNAL MEDICINE

## 2024-11-05 PROCEDURE — 3023F SPIROM DOC REV: CPT | Performed by: INTERNAL MEDICINE

## 2024-11-05 PROCEDURE — G8427 DOCREV CUR MEDS BY ELIG CLIN: HCPCS | Performed by: INTERNAL MEDICINE

## 2024-11-05 PROCEDURE — 3079F DIAST BP 80-89 MM HG: CPT | Performed by: INTERNAL MEDICINE

## 2024-11-05 PROCEDURE — G8417 CALC BMI ABV UP PARAM F/U: HCPCS | Performed by: INTERNAL MEDICINE

## 2024-11-05 RX ORDER — DAPSONE 100 MG/1
50 TABLET ORAL 2 TIMES DAILY
Qty: 60 TABLET | Refills: 5 | Status: SHIPPED | OUTPATIENT
Start: 2024-11-05 | End: 2025-10-31

## 2024-11-05 RX ORDER — BUDESONIDE, GLYCOPYRROLATE, AND FORMOTEROL FUMARATE 160; 9; 4.8 UG/1; UG/1; UG/1
2 AEROSOL, METERED RESPIRATORY (INHALATION) 2 TIMES DAILY
Qty: 1 EACH | Refills: 5 | Status: SHIPPED | OUTPATIENT
Start: 2024-11-05

## 2024-11-05 NOTE — PROGRESS NOTES
tablet TAKE 1 TABLET BY MOUTH 2 TIMES DAILY Yes [facility] APRN - CNP   lisinopril (PRINIVIL;ZESTRIL) 10 MG tablet TAKE 1 TABLET BY MOUTH EVERY DAY Yes Attila Cantu MD   clopidogrel (PLAVIX) 75 MG tablet TAKE 1 TABLET BY MOUTH EVERY DAY Yes Attila Cantu MD   ranolazine (RANEXA) 500 MG extended release tablet Take 1 tablet by mouth 2 times daily Yes [facility] APRN - CNP   nitroGLYCERIN (NITROSTAT) 0.4 MG SL tablet Place 1 tablet under the tongue every 5 minutes as needed for Chest pain Yes Attila Cantu MD   ibuprofen (ADVIL;MOTRIN) 200 MG tablet Take 6-8 tablets by mouth daily Yes Kinga Verde MD   Cholecalciferol (VITAMIN D3) 125 MCG (5000 UT) TABS Take by mouth Yes Kinga Verde MD   Biotin w/ Vitamins C & E 1250-7.5-7.5 MCG-MG-UNT CHEW Take by mouth Yes Kinga Verde MD   aspirin 81 MG EC tablet Take 1 tablet by mouth daily Yes Kinga Verde MD   albuterol sulfate HFA (PROVENTIL HFA) 108 (90 Base) MCG/ACT inhaler Inhale 2 puffs into the lungs every 4 hours as needed for Wheezing Yes Joie Tsai APRN - CNP   omeprazole 20 MG EC tablet Take by mouth 1 tab bid by pcp Yes Kinga Verde MD   HYDROcodone-acetaminophen (NORCO)  MG per tablet Take 1 tablet by mouth. Yes Kinga Verde MD   Respiratory Therapy Supplies (NEBULIZER/TUBING/MOUTHPIECE) KIT 1 kit by Does not apply route daily as needed (wheezing) Dx: COPD   ICD-10: J44.9 Yes Hiren Clinton MD   Nebulizers (COMPRESSOR/NEBULIZER) MISC To be used with albuterol 0.083% - Dx: COPD   ICD-10: J44.9 Yes Hiren Clinton MD   ANORO ELLIPTA 62.5-25 MCG/ACT inhaler TAKE 1 PUFF BY MOUTH EVERY DAY  Patient not taking: Reported on 11/5/2024  Mio León MD       Vitals:    11/05/24 1550   BP: 136/84   Site: Left Upper Arm   Position: Sitting   Cuff Size: Large Adult   Pulse: 88   SpO2: (!) 87%   Weight: 113 kg (249 lb 3.2 oz)   Height: 1.549 m (5' 1\")     Body mass

## 2024-11-06 ENCOUNTER — TELEPHONE (OUTPATIENT)
Dept: ADMINISTRATIVE | Age: 49
End: 2024-11-06

## 2024-11-06 NOTE — TELEPHONE ENCOUNTER
[facility] [employer] 160-9-4.8MCG/ACT aerosol   Via CMM Key: [id number]  STATUS: PENDING.    Follow up done daily; if no decision with in three days we will refax.  If another three days goes by with no decision will call the insurance for status.

## 2024-11-12 DIAGNOSIS — J44.9 COPD, SEVERITY TO BE DETERMINED (HCC): Primary | ICD-10-CM

## 2024-11-12 RX ORDER — FLUTICASONE FUROATE, UMECLIDINIUM BROMIDE AND VILANTEROL TRIFENATATE 100; 62.5; 25 UG/1; UG/1; UG/1
1 POWDER RESPIRATORY (INHALATION) DAILY
COMMUNITY
End: 2024-11-12 | Stop reason: SDUPTHER

## 2024-11-12 RX ORDER — FLUTICASONE FUROATE, UMECLIDINIUM BROMIDE AND VILANTEROL TRIFENATATE 100; 62.5; 25 UG/1; UG/1; UG/1
1 POWDER RESPIRATORY (INHALATION) DAILY
Qty: 2 EACH | Refills: 0 | Status: SHIPPED | COMMUNITY
Start: 2024-11-12

## 2024-11-12 NOTE — TELEPHONE ENCOUNTER
The medication was DENIED; DENIAL letter is uploaded to MEDIA.    Generic Denial:  Other; please see Denial Letter.         Note :  Coverage is provided when the member meets ALL of the following requirements: 1. Coverage is provided when the member has a history of at least 14 days of therapy with at least TWO preferred medication(s) used concomitantly which include but are not limited to: Advair Diskus (Brand name is covered by the plan), Advair HFA (Brand name is covered by the plan), Anoro Ellipta, Arnuity Ellipta, Asmanex Twisthaler, Atrovent HFA, Dulera, Pulmicort Flexhaler, Qvar, Serevent Diskus, Spiriva (Brand name is covered by the plan), Stiolto and Symbicort (Brand name is covered by the plan) AND 2. Documentation must be provided of medical necessity beyond convenience for why the member cannot be changed to the preferred drug(s). Chart notes from your provider and/or pharmacy claims history received do not show a documented 14 day trial of the preferred medication(s). Please provide start and end dates of use for all preferred medications. Member has coverage for the time indicated of the trials of Pulmicort, Advair, Spiriva and Symbicort medications, but they are not found in claims history. Please explain how the member was receiving medication (samples are not accepted as trials). The [facility] Policy for Medical Necessity as posted on the [facility] website and [place] Unified Preferred Drug List criteria were reviewed and per [place] Administrative Code Rule 5160-1-01 (C) and 5160-26-03 (B), a medically necessary service must include: generally accepted standards of medical practice, be clinically appropriate in administration, treatment and outcome and be the lowest cost alternative to effectively treat the condition. Please contact your provider to assist you with other treatment options that might be covered under your benefit package, or other services that might be available through the community.

## 2024-11-20 ENCOUNTER — TELEPHONE (OUTPATIENT)
Dept: ENDOCRINOLOGY | Age: 49
End: 2024-11-20

## 2024-11-20 NOTE — TELEPHONE ENCOUNTER
Fax from [facility] sending referral to see Dr. Tenorio    Dx- T2DM     Referring Mariel Turcios MD

## 2024-11-26 ENCOUNTER — TELEPHONE (OUTPATIENT)
Dept: SLEEP CENTER | Age: 49
End: 2024-11-26

## 2024-11-26 NOTE — TELEPHONE ENCOUNTER
Returned pt's call to reschedule sleep study at      She was sick and had to cancel     Left vm for the pt to call us back

## 2025-01-13 ENCOUNTER — APPOINTMENT (OUTPATIENT)
Dept: GENERAL RADIOLOGY | Age: 50
End: 2025-01-13
Payer: COMMERCIAL

## 2025-01-13 ENCOUNTER — HOSPITAL ENCOUNTER (INPATIENT)
Age: 50
LOS: 5 days | Discharge: HOME | End: 2025-01-18
Attending: EMERGENCY MEDICINE | Admitting: STUDENT IN AN ORGANIZED HEALTH CARE EDUCATION/TRAINING PROGRAM
Payer: COMMERCIAL

## 2025-01-13 ENCOUNTER — APPOINTMENT (OUTPATIENT)
Dept: CT IMAGING | Age: 50
End: 2025-01-13
Payer: COMMERCIAL

## 2025-01-13 ENCOUNTER — OFFICE VISIT (OUTPATIENT)
Dept: PULMONOLOGY | Age: 50
End: 2025-01-13
Payer: COMMERCIAL

## 2025-01-13 VITALS
SYSTOLIC BLOOD PRESSURE: 132 MMHG | BODY MASS INDEX: 46.07 KG/M2 | OXYGEN SATURATION: 91 % | HEIGHT: 61 IN | DIASTOLIC BLOOD PRESSURE: 80 MMHG | HEART RATE: 78 BPM | WEIGHT: 244 LBS

## 2025-01-13 DIAGNOSIS — J96.02 ACUTE RESPIRATORY FAILURE WITH HYPERCAPNIA: Primary | ICD-10-CM

## 2025-01-13 DIAGNOSIS — R06.02 SHORTNESS OF BREATH: ICD-10-CM

## 2025-01-13 DIAGNOSIS — J44.1 COPD EXACERBATION (HCC): ICD-10-CM

## 2025-01-13 DIAGNOSIS — J44.9 COPD, SEVERITY TO BE DETERMINED (HCC): ICD-10-CM

## 2025-01-13 DIAGNOSIS — R79.89 ELEVATED TROPONIN: ICD-10-CM

## 2025-01-13 DIAGNOSIS — Z71.6 TOBACCO ABUSE COUNSELING: ICD-10-CM

## 2025-01-13 PROBLEM — J96.22 ACUTE ON CHRONIC RESPIRATORY FAILURE WITH HYPERCAPNIA: Status: ACTIVE | Noted: 2025-01-13

## 2025-01-13 LAB
ALBUMIN SERPL-MCNC: 3.7 G/DL (ref 3.4–5)
ALBUMIN/GLOB SERPL: 1.5 {RATIO} (ref 1.1–2.2)
ALP SERPL-CCNC: 129 U/L (ref 40–129)
ALT SERPL-CCNC: 38 U/L (ref 10–40)
ANION GAP SERPL CALCULATED.3IONS-SCNC: 6 MMOL/L (ref 3–16)
AST SERPL-CCNC: 28 U/L (ref 15–37)
BASE EXCESS BLDV CALC-SCNC: 11.3 MMOL/L (ref -3–3)
BASE EXCESS BLDV CALC-SCNC: 14.1 MMOL/L (ref -3–3)
BASOPHILS # BLD: 0 K/UL (ref 0–0.2)
BASOPHILS NFR BLD: 0.3 %
BILIRUB SERPL-MCNC: <0.2 MG/DL (ref 0–1)
BUN SERPL-MCNC: 24 MG/DL (ref 7–20)
CALCIUM SERPL-MCNC: 9.3 MG/DL (ref 8.3–10.6)
CHLORIDE SERPL-SCNC: 100 MMOL/L (ref 99–110)
CO2 BLDV-SCNC: 95 MMOL/L
CO2 BLDV-SCNC: 98 MMOL/L
CO2 SERPL-SCNC: 38 MMOL/L (ref 21–32)
COHGB MFR BLDV: 10 % (ref 0–1.5)
COHGB MFR BLDV: 7.7 % (ref 0–1.5)
CREAT SERPL-MCNC: 0.7 MG/DL (ref 0.6–1.1)
DEPRECATED RDW RBC AUTO: 18.3 % (ref 12.4–15.4)
EOSINOPHIL # BLD: 0 K/UL (ref 0–0.6)
EOSINOPHIL NFR BLD: 0 %
FLUAV RNA RESP QL NAA+PROBE: NOT DETECTED
FLUBV RNA RESP QL NAA+PROBE: NOT DETECTED
GFR SERPLBLD CREATININE-BSD FMLA CKD-EPI: >90 ML/MIN/{1.73_M2}
GLUCOSE SERPL-MCNC: 285 MG/DL (ref 70–99)
HCO3 BLDV-SCNC: 39.7 MMOL/L (ref 23–29)
HCO3 BLDV-SCNC: 41.6 MMOL/L (ref 23–29)
HCT VFR BLD AUTO: 30.5 % (ref 36–48)
HGB BLD-MCNC: 9 G/DL (ref 12–16)
LACTATE BLDV-SCNC: 1.6 MMOL/L (ref 0.4–1.9)
LIPASE SERPL-CCNC: 42 U/L (ref 13–60)
LYMPHOCYTES # BLD: 0.3 K/UL (ref 1–5.1)
LYMPHOCYTES NFR BLD: 2 %
MCH RBC QN AUTO: 25.1 PG (ref 26–34)
MCHC RBC AUTO-ENTMCNC: 29.6 G/DL (ref 31–36)
MCV RBC AUTO: 84.6 FL (ref 80–100)
METHGB MFR BLDV: 0.5 %
METHGB MFR BLDV: 0.8 %
MONOCYTES # BLD: 0.5 K/UL (ref 0–1.3)
MONOCYTES NFR BLD: 3.3 %
NEUTROPHILS # BLD: 13.4 K/UL (ref 1.7–7.7)
NEUTROPHILS NFR BLD: 94.4 %
NT-PROBNP SERPL-MCNC: 773 PG/ML (ref 0–124)
O2 CT VFR BLDV CALC: 11 VOL %
O2 CT VFR BLDV CALC: 12 VOL %
O2 THERAPY: ABNORMAL
O2 THERAPY: ABNORMAL
PCO2 BLDV: 72.4 MMHG (ref 40–50)
PCO2 BLDV: 80.5 MMHG (ref 40–50)
PH BLDV: 7.3 [PH] (ref 7.35–7.45)
PH BLDV: 7.37 [PH] (ref 7.35–7.45)
PLATELET # BLD AUTO: 177 K/UL (ref 135–450)
PMV BLD AUTO: 9.1 FL (ref 5–10.5)
PO2 BLDV: 163 MMHG (ref 25–40)
PO2 BLDV: 96.6 MMHG (ref 25–40)
POTASSIUM SERPL-SCNC: 5 MMOL/L (ref 3.5–5.1)
PROCALCITONIN SERPL IA-MCNC: 0.14 NG/ML (ref 0–0.15)
PROT SERPL-MCNC: 6.2 G/DL (ref 6.4–8.2)
RBC # BLD AUTO: 3.61 M/UL (ref 4–5.2)
SAO2 % BLDV: 98 %
SAO2 % BLDV: >100 %
SARS-COV-2 RNA RESP QL NAA+PROBE: NOT DETECTED
SODIUM SERPL-SCNC: 144 MMOL/L (ref 136–145)
TROPONIN, HIGH SENSITIVITY: 32 NG/L (ref 0–14)
WBC # BLD AUTO: 14.2 K/UL (ref 4–11)

## 2025-01-13 PROCEDURE — 3017F COLORECTAL CA SCREEN DOC REV: CPT | Performed by: INTERNAL MEDICINE

## 2025-01-13 PROCEDURE — 6370000000 HC RX 637 (ALT 250 FOR IP): Performed by: PHYSICIAN ASSISTANT

## 2025-01-13 PROCEDURE — 83880 ASSAY OF NATRIURETIC PEPTIDE: CPT

## 2025-01-13 PROCEDURE — 3075F SYST BP GE 130 - 139MM HG: CPT | Performed by: INTERNAL MEDICINE

## 2025-01-13 PROCEDURE — 6360000002 HC RX W HCPCS: Performed by: PHYSICIAN ASSISTANT

## 2025-01-13 PROCEDURE — 80053 COMPREHEN METABOLIC PANEL: CPT

## 2025-01-13 PROCEDURE — 87807 RSV ASSAY W/OPTIC: CPT

## 2025-01-13 PROCEDURE — 71045 X-RAY EXAM CHEST 1 VIEW: CPT

## 2025-01-13 PROCEDURE — 94640 AIRWAY INHALATION TREATMENT: CPT

## 2025-01-13 PROCEDURE — 2580000003 HC RX 258: Performed by: PHYSICIAN ASSISTANT

## 2025-01-13 PROCEDURE — 87636 SARSCOV2 & INF A&B AMP PRB: CPT

## 2025-01-13 PROCEDURE — 99285 EMERGENCY DEPT VISIT HI MDM: CPT

## 2025-01-13 PROCEDURE — 6370000000 HC RX 637 (ALT 250 FOR IP): Performed by: EMERGENCY MEDICINE

## 2025-01-13 PROCEDURE — G8427 DOCREV CUR MEDS BY ELIG CLIN: HCPCS | Performed by: INTERNAL MEDICINE

## 2025-01-13 PROCEDURE — 83690 ASSAY OF LIPASE: CPT

## 2025-01-13 PROCEDURE — 6360000004 HC RX CONTRAST MEDICATION: Performed by: PHYSICIAN ASSISTANT

## 2025-01-13 PROCEDURE — 85025 COMPLETE CBC W/AUTO DIFF WBC: CPT

## 2025-01-13 PROCEDURE — 93005 ELECTROCARDIOGRAM TRACING: CPT | Performed by: PHYSICIAN ASSISTANT

## 2025-01-13 PROCEDURE — 96375 TX/PRO/DX INJ NEW DRUG ADDON: CPT

## 2025-01-13 PROCEDURE — 96365 THER/PROPH/DIAG IV INF INIT: CPT

## 2025-01-13 PROCEDURE — 84484 ASSAY OF TROPONIN QUANT: CPT

## 2025-01-13 PROCEDURE — 94660 CPAP INITIATION&MGMT: CPT

## 2025-01-13 PROCEDURE — 83605 ASSAY OF LACTIC ACID: CPT

## 2025-01-13 PROCEDURE — 71260 CT THORAX DX C+: CPT

## 2025-01-13 PROCEDURE — G8417 CALC BMI ABV UP PARAM F/U: HCPCS | Performed by: INTERNAL MEDICINE

## 2025-01-13 PROCEDURE — 84145 PROCALCITONIN (PCT): CPT

## 2025-01-13 PROCEDURE — 3023F SPIROM DOC REV: CPT | Performed by: INTERNAL MEDICINE

## 2025-01-13 PROCEDURE — 82803 BLOOD GASES ANY COMBINATION: CPT

## 2025-01-13 PROCEDURE — 4004F PT TOBACCO SCREEN RCVD TLK: CPT | Performed by: INTERNAL MEDICINE

## 2025-01-13 PROCEDURE — 99214 OFFICE O/P EST MOD 30 MIN: CPT | Performed by: INTERNAL MEDICINE

## 2025-01-13 PROCEDURE — 2060000000 HC ICU INTERMEDIATE R&B

## 2025-01-13 PROCEDURE — 3079F DIAST BP 80-89 MM HG: CPT | Performed by: INTERNAL MEDICINE

## 2025-01-13 PROCEDURE — 2500000003 HC RX 250 WO HCPCS: Performed by: PHYSICIAN ASSISTANT

## 2025-01-13 RX ORDER — ENOXAPARIN SODIUM 100 MG/ML
30 INJECTION SUBCUTANEOUS 2 TIMES DAILY
Status: CANCELLED | OUTPATIENT
Start: 2025-01-13

## 2025-01-13 RX ORDER — POLYETHYLENE GLYCOL 3350 17 G/17G
17 POWDER, FOR SOLUTION ORAL DAILY PRN
Status: CANCELLED | OUTPATIENT
Start: 2025-01-13

## 2025-01-13 RX ORDER — ASPIRIN 81 MG/1
324 TABLET, CHEWABLE ORAL ONCE
Status: COMPLETED | OUTPATIENT
Start: 2025-01-13 | End: 2025-01-13

## 2025-01-13 RX ORDER — IPRATROPIUM BROMIDE AND ALBUTEROL SULFATE 2.5; .5 MG/3ML; MG/3ML
1 SOLUTION RESPIRATORY (INHALATION) ONCE
Status: COMPLETED | OUTPATIENT
Start: 2025-01-13 | End: 2025-01-13

## 2025-01-13 RX ORDER — PREDNISONE 10 MG/1
10 TABLET ORAL SEE ADMIN INSTRUCTIONS
Status: ON HOLD | COMMUNITY
End: 2025-01-17 | Stop reason: HOSPADM

## 2025-01-13 RX ORDER — HYDROCODONE BITARTRATE AND ACETAMINOPHEN 5; 325 MG/1; MG/1
2 TABLET ORAL ONCE
Status: COMPLETED | OUTPATIENT
Start: 2025-01-13 | End: 2025-01-13

## 2025-01-13 RX ORDER — INSULIN LISPRO 100 [IU]/ML
33-47 INJECTION, SOLUTION INTRAVENOUS; SUBCUTANEOUS
COMMUNITY

## 2025-01-13 RX ORDER — FLUTICASONE FUROATE, UMECLIDINIUM BROMIDE AND VILANTEROL TRIFENATATE 100; 62.5; 25 UG/1; UG/1; UG/1
1 POWDER RESPIRATORY (INHALATION) DAILY
Qty: 60 EACH | Refills: 3 | Status: CANCELLED | OUTPATIENT
Start: 2025-01-13

## 2025-01-13 RX ORDER — MAGNESIUM SULFATE IN WATER 40 MG/ML
2000 INJECTION, SOLUTION INTRAVENOUS ONCE
Status: COMPLETED | OUTPATIENT
Start: 2025-01-13 | End: 2025-01-13

## 2025-01-13 RX ORDER — ONDANSETRON 2 MG/ML
4 INJECTION INTRAMUSCULAR; INTRAVENOUS EVERY 6 HOURS PRN
Status: CANCELLED | OUTPATIENT
Start: 2025-01-13

## 2025-01-13 RX ORDER — ALBUTEROL SULFATE 90 UG/1
2 INHALANT RESPIRATORY (INHALATION) EVERY 4 HOURS PRN
Qty: 18 G | Refills: 11 | Status: CANCELLED | OUTPATIENT
Start: 2025-01-13

## 2025-01-13 RX ORDER — ALBUTEROL SULFATE 0.83 MG/ML
2.5 SOLUTION RESPIRATORY (INHALATION) ONCE
Status: COMPLETED | OUTPATIENT
Start: 2025-01-13 | End: 2025-01-13

## 2025-01-13 RX ORDER — LANOLIN ALCOHOL/MO/W.PET/CERES
400 CREAM (GRAM) TOPICAL DAILY
Status: ON HOLD | COMMUNITY
End: 2025-01-17 | Stop reason: HOSPADM

## 2025-01-13 RX ORDER — IOPAMIDOL 755 MG/ML
75 INJECTION, SOLUTION INTRAVASCULAR
Status: DISCONTINUED | OUTPATIENT
Start: 2025-01-13 | End: 2025-01-13 | Stop reason: HOSPADM

## 2025-01-13 RX ORDER — ONDANSETRON 4 MG/1
4 TABLET, ORALLY DISINTEGRATING ORAL EVERY 8 HOURS PRN
Status: CANCELLED | OUTPATIENT
Start: 2025-01-13

## 2025-01-13 RX ORDER — ONDANSETRON 2 MG/ML
4 INJECTION INTRAMUSCULAR; INTRAVENOUS EVERY 6 HOURS PRN
Status: DISCONTINUED | OUTPATIENT
Start: 2025-01-13 | End: 2025-01-14 | Stop reason: HOSPADM

## 2025-01-13 RX ORDER — IPRATROPIUM BROMIDE AND ALBUTEROL SULFATE 2.5; .5 MG/3ML; MG/3ML
1 SOLUTION RESPIRATORY (INHALATION) EVERY 4 HOURS
Qty: 360 ML | Refills: 3 | Status: CANCELLED | OUTPATIENT
Start: 2025-01-13

## 2025-01-13 RX ORDER — ONDANSETRON 4 MG/1
4 TABLET, ORALLY DISINTEGRATING ORAL EVERY 4 HOURS PRN
COMMUNITY

## 2025-01-13 RX ORDER — METOPROLOL SUCCINATE 50 MG/1
75 TABLET, EXTENDED RELEASE ORAL 2 TIMES DAILY
Status: ON HOLD | COMMUNITY
End: 2025-01-18 | Stop reason: HOSPADM

## 2025-01-13 RX ORDER — INSULIN GLARGINE 100 [IU]/ML
45 INJECTION, SOLUTION SUBCUTANEOUS NIGHTLY
COMMUNITY

## 2025-01-13 RX ADMIN — DOXYCYCLINE 100 MG: 100 INJECTION, POWDER, LYOPHILIZED, FOR SOLUTION INTRAVENOUS at 23:25

## 2025-01-13 RX ADMIN — ONDANSETRON 4 MG: 2 INJECTION, SOLUTION INTRAMUSCULAR; INTRAVENOUS at 19:31

## 2025-01-13 RX ADMIN — IPRATROPIUM BROMIDE AND ALBUTEROL SULFATE 1 DOSE: .5; 3 SOLUTION RESPIRATORY (INHALATION) at 20:01

## 2025-01-13 RX ADMIN — ALBUTEROL SULFATE 2.5 MG: 2.5 SOLUTION RESPIRATORY (INHALATION) at 20:01

## 2025-01-13 RX ADMIN — ASPIRIN 324 MG: 81 TABLET, CHEWABLE ORAL at 19:31

## 2025-01-13 RX ADMIN — HYDROCODONE BITARTRATE AND ACETAMINOPHEN 2 TABLET: 5; 325 TABLET ORAL at 22:10

## 2025-01-13 RX ADMIN — IOPAMIDOL 75 ML: 755 INJECTION, SOLUTION INTRAVENOUS at 20:39

## 2025-01-13 RX ADMIN — MAGNESIUM SULFATE HEPTAHYDRATE 2000 MG: 40 INJECTION, SOLUTION INTRAVENOUS at 21:47

## 2025-01-13 ASSESSMENT — HEART SCORE: ECG: NORMAL

## 2025-01-13 ASSESSMENT — PAIN DESCRIPTION - ORIENTATION: ORIENTATION: RIGHT;LEFT

## 2025-01-13 ASSESSMENT — PAIN SCALES - GENERAL
PAINLEVEL_OUTOF10: 6
PAINLEVEL_OUTOF10: 7

## 2025-01-13 ASSESSMENT — PAIN DESCRIPTION - LOCATION
LOCATION: HIP
LOCATION: ABDOMEN

## 2025-01-13 ASSESSMENT — PAIN - FUNCTIONAL ASSESSMENT: PAIN_FUNCTIONAL_ASSESSMENT: 0-10

## 2025-01-13 ASSESSMENT — PAIN DESCRIPTION - PAIN TYPE: TYPE: ACUTE PAIN

## 2025-01-13 NOTE — PROGRESS NOTES
[facility]  Follow Up Note  Dmitry Chew MD       Cassandra Bowser   YOB: 1975    Date of Visit:  1/13/2025    Assessment/Plan:  1. COPD, severity to be determined (HCC)  She is wheezing on exam  She is taking Prednisone as her primary treatment.   She does have albuterol HFA and Duoneb HHN which she takes as well.    Not on PJP prophylaxis.  Continues to smoke  Has not had requested PFT or CT chest.    I think the only option for her is admission and inpatient treatment  We need to get her over the flare up and get her on a reasonable regimen of inhaled medications.     Spoke with the bed coordinator and thre are no available beds.  Will send her to [facility]      2. Chronic hypoxemic respiratory failure  Her resting SpO2 today was 91% on 2 LPM compressed gas O2    3. Obstructive sleep apnea  She has never been tested    Plan:  Get PSG after she is released from the hospital    4. Morbid obesity, unspecified obesity type  Obesity is a contributor to her symptoms  She has KATTY and is untreated for that.     5. Chronic GERD  This is a big problem for her and maybe responsible for the sensation she gets in her throat that seems to drive her to taking Prednisone    6. Tobacco abuse  Spent 3-10 minutes counseling patient on smoking cessation, discussing strategies and resources to support the patient in quitting.    Clearly, she needs to quit smoking. Discussed options to help her quit, she cannot take Chantix 2/2 bad reaction in the past.      F/U here after DC from the hospital    Chief Complaint   Patient presents with    Acute Renal Failure    Shortness of Breath    Wheezing    Cough       HPI  The patient presents with a chief complaint of shortness of breath. She also complains of congested cough and extreme fatigue. She is on supplemental O2 24 hours per day but her SpO2 at rest is only 91% today. She is not on controller medication for her COPD. She

## 2025-01-14 ENCOUNTER — APPOINTMENT (OUTPATIENT)
Age: 50
End: 2025-01-14
Payer: COMMERCIAL

## 2025-01-14 LAB
ALBUMIN SERPL-MCNC: 3.6 G/DL (ref 3.4–5)
ALBUMIN/GLOB SERPL: 1.6 {RATIO} (ref 1.1–2.2)
ALP SERPL-CCNC: 140 U/L (ref 40–129)
ALT SERPL-CCNC: 65 U/L (ref 10–40)
ANION GAP SERPL CALCULATED.3IONS-SCNC: 8 MMOL/L (ref 3–16)
AST SERPL-CCNC: 49 U/L (ref 15–37)
BASE EXCESS BLDA CALC-SCNC: 14.8 MMOL/L (ref -3–3)
BASE EXCESS BLDA CALC-SCNC: 18.4 MMOL/L (ref -3–3)
BASOPHILS # BLD: 0 K/UL (ref 0–0.2)
BASOPHILS NFR BLD: 0.3 %
BILIRUB SERPL-MCNC: <0.2 MG/DL (ref 0–1)
BUN SERPL-MCNC: 25 MG/DL (ref 7–20)
CALCIUM SERPL-MCNC: 8.9 MG/DL (ref 8.3–10.6)
CHLORIDE SERPL-SCNC: 101 MMOL/L (ref 99–110)
CO2 BLDA-SCNC: 102 MMOL/L
CO2 BLDA-SCNC: 105.8 MMOL/L
CO2 SERPL-SCNC: 35 MMOL/L (ref 21–32)
COHGB MFR BLDA: 2.7 % (ref 0–1.5)
COHGB MFR BLDA: 3.2 % (ref 0–1.5)
CREAT SERPL-MCNC: 0.6 MG/DL (ref 0.6–1.1)
DEPRECATED RDW RBC AUTO: 18.6 % (ref 12.4–15.4)
ECHO AO ASC DIAM: 3.4 CM
ECHO AO ASCENDING AORTA INDEX: 1.6 CM/M2
ECHO AO ROOT DIAM: 2.8 CM
ECHO AO ROOT INDEX: 1.31 CM/M2
ECHO BSA: 2.28 M2
ECHO IVC PROX: 2 CM
ECHO LA DIAMETER INDEX: 1.64 CM/M2
ECHO LA DIAMETER: 3.5 CM
ECHO LA TO AORTIC ROOT RATIO: 1.25
ECHO LV EF PHYSICIAN: 55 %
ECHO LV FRACTIONAL SHORTENING: 31 % (ref 28–44)
ECHO LV INTERNAL DIMENSION DIASTOLE INDEX: 1.97 CM/M2
ECHO LV INTERNAL DIMENSION DIASTOLIC: 4.2 CM (ref 3.9–5.3)
ECHO LV INTERNAL DIMENSION SYSTOLIC INDEX: 1.36 CM/M2
ECHO LV INTERNAL DIMENSION SYSTOLIC: 2.9 CM
ECHO LV IVSD: 0.9 CM (ref 0.6–0.9)
ECHO LV MASS 2D: 127.8 G (ref 67–162)
ECHO LV MASS INDEX 2D: 60 G/M2 (ref 43–95)
ECHO LV POSTERIOR WALL DIASTOLIC: 1 CM (ref 0.6–0.9)
ECHO LV RELATIVE WALL THICKNESS RATIO: 0.48
ECHO LVOT AREA: 3.1 CM2
ECHO LVOT DIAM: 2 CM
ECHO PV MAX VELOCITY: 1.2 M/S
ECHO PV PEAK GRADIENT: 6 MMHG
EKG ATRIAL RATE: 71 BPM
EKG DIAGNOSIS: NORMAL
EKG P AXIS: 58 DEGREES
EKG P-R INTERVAL: 116 MS
EKG Q-T INTERVAL: 360 MS
EKG QRS DURATION: 72 MS
EKG QTC CALCULATION (BAZETT): 391 MS
EKG R AXIS: 53 DEGREES
EKG T AXIS: 23 DEGREES
EKG VENTRICULAR RATE: 71 BPM
EOSINOPHIL # BLD: 0 K/UL (ref 0–0.6)
EOSINOPHIL NFR BLD: 0.1 %
FERRITIN SERPL IA-MCNC: 18.1 NG/ML (ref 15–150)
GFR SERPLBLD CREATININE-BSD FMLA CKD-EPI: >90 ML/MIN/{1.73_M2}
GLUCOSE BLD-MCNC: 153 MG/DL (ref 70–99)
GLUCOSE BLD-MCNC: 155 MG/DL (ref 70–99)
GLUCOSE BLD-MCNC: 166 MG/DL (ref 70–99)
GLUCOSE BLD-MCNC: 179 MG/DL (ref 70–99)
GLUCOSE BLD-MCNC: 228 MG/DL (ref 70–99)
GLUCOSE SERPL-MCNC: 176 MG/DL (ref 70–99)
HCO3 BLDA-SCNC: 43 MMOL/L (ref 21–29)
HCO3 BLDA-SCNC: 45.1 MMOL/L (ref 21–29)
HCT VFR BLD AUTO: 28.6 % (ref 36–48)
HGB BLD-MCNC: 8.7 G/DL (ref 12–16)
HGB BLDA-MCNC: 8.1 G/DL (ref 12–16)
HGB BLDA-MCNC: 8.3 G/DL (ref 12–16)
IRON SATN MFR SERPL: 5 % (ref 15–50)
IRON SERPL-MCNC: 19 UG/DL (ref 37–145)
LYMPHOCYTES # BLD: 1 K/UL (ref 1–5.1)
LYMPHOCYTES NFR BLD: 7.7 %
MCH RBC QN AUTO: 25.6 PG (ref 26–34)
MCHC RBC AUTO-ENTMCNC: 30.4 G/DL (ref 31–36)
MCV RBC AUTO: 84.2 FL (ref 80–100)
METHGB MFR BLDA: 0.8 %
METHGB MFR BLDA: 1.2 %
MONOCYTES # BLD: 0.9 K/UL (ref 0–1.3)
MONOCYTES NFR BLD: 6.7 %
NEUTROPHILS # BLD: 11.1 K/UL (ref 1.7–7.7)
NEUTROPHILS NFR BLD: 85.2 %
O2 THERAPY: ABNORMAL
O2 THERAPY: ABNORMAL
ORGANISM: ABNORMAL
PCO2 BLDA: 69.4 MMHG (ref 35–45)
PCO2 BLDA: 83.4 MMHG (ref 35–45)
PERFORMED ON: ABNORMAL
PH BLDA: 7.32 [PH] (ref 7.35–7.45)
PH BLDA: 7.42 [PH] (ref 7.35–7.45)
PLATELET # BLD AUTO: 149 K/UL (ref 135–450)
PMV BLD AUTO: 8.8 FL (ref 5–10.5)
PO2 BLDA: 102 MMHG (ref 75–108)
PO2 BLDA: 80.6 MMHG (ref 75–108)
POTASSIUM SERPL-SCNC: 4.8 MMOL/L (ref 3.5–5.1)
PROT SERPL-MCNC: 5.9 G/DL (ref 6.4–8.2)
RBC # BLD AUTO: 3.39 M/UL (ref 4–5.2)
REPORT: NORMAL
RESP PATH DNA+RNA PNL NPH NAA+NON-PROBE: ABNORMAL
RSV AG NOSE QL: NEGATIVE
SAO2 % BLDA: 97.6 %
SAO2 % BLDA: 99.6 %
SODIUM SERPL-SCNC: 144 MMOL/L (ref 136–145)
TIBC SERPL-MCNC: 383 UG/DL (ref 260–445)
TROPONIN, HIGH SENSITIVITY: 32 NG/L (ref 0–14)
WBC # BLD AUTO: 13 K/UL (ref 4–11)

## 2025-01-14 PROCEDURE — C8929 TTE W OR WO FOL WCON,DOPPLER: HCPCS

## 2025-01-14 PROCEDURE — 6370000000 HC RX 637 (ALT 250 FOR IP): Performed by: NURSE PRACTITIONER

## 2025-01-14 PROCEDURE — 84484 ASSAY OF TROPONIN QUANT: CPT

## 2025-01-14 PROCEDURE — 93306 TTE W/DOPPLER COMPLETE: CPT | Performed by: INTERNAL MEDICINE

## 2025-01-14 PROCEDURE — 36415 COLL VENOUS BLD VENIPUNCTURE: CPT

## 2025-01-14 PROCEDURE — 83550 IRON BINDING TEST: CPT

## 2025-01-14 PROCEDURE — 94640 AIRWAY INHALATION TREATMENT: CPT

## 2025-01-14 PROCEDURE — 5A09457 ASSISTANCE WITH RESPIRATORY VENTILATION, 24-96 CONSECUTIVE HOURS, CONTINUOUS POSITIVE AIRWAY PRESSURE: ICD-10-PCS | Performed by: PHYSICIAN ASSISTANT

## 2025-01-14 PROCEDURE — 93010 ELECTROCARDIOGRAM REPORT: CPT | Performed by: INTERNAL MEDICINE

## 2025-01-14 PROCEDURE — 82803 BLOOD GASES ANY COMBINATION: CPT

## 2025-01-14 PROCEDURE — 2580000003 HC RX 258: Performed by: NURSE PRACTITIONER

## 2025-01-14 PROCEDURE — 6360000002 HC RX W HCPCS: Performed by: STUDENT IN AN ORGANIZED HEALTH CARE EDUCATION/TRAINING PROGRAM

## 2025-01-14 PROCEDURE — 94761 N-INVAS EAR/PLS OXIMETRY MLT: CPT

## 2025-01-14 PROCEDURE — 94660 CPAP INITIATION&MGMT: CPT

## 2025-01-14 PROCEDURE — 6360000004 HC RX CONTRAST MEDICATION: Performed by: NURSE PRACTITIONER

## 2025-01-14 PROCEDURE — 80053 COMPREHEN METABOLIC PANEL: CPT

## 2025-01-14 PROCEDURE — 2700000000 HC OXYGEN THERAPY PER DAY

## 2025-01-14 PROCEDURE — 2500000003 HC RX 250 WO HCPCS: Performed by: STUDENT IN AN ORGANIZED HEALTH CARE EDUCATION/TRAINING PROGRAM

## 2025-01-14 PROCEDURE — 36600 WITHDRAWAL OF ARTERIAL BLOOD: CPT

## 2025-01-14 PROCEDURE — 2500000003 HC RX 250 WO HCPCS: Performed by: NURSE PRACTITIONER

## 2025-01-14 PROCEDURE — 83540 ASSAY OF IRON: CPT

## 2025-01-14 PROCEDURE — 6360000002 HC RX W HCPCS: Performed by: NURSE PRACTITIONER

## 2025-01-14 PROCEDURE — 82728 ASSAY OF FERRITIN: CPT

## 2025-01-14 PROCEDURE — 6370000000 HC RX 637 (ALT 250 FOR IP): Performed by: STUDENT IN AN ORGANIZED HEALTH CARE EDUCATION/TRAINING PROGRAM

## 2025-01-14 PROCEDURE — 0202U NFCT DS 22 TRGT SARS-COV-2: CPT

## 2025-01-14 PROCEDURE — 99291 CRITICAL CARE FIRST HOUR: CPT | Performed by: STUDENT IN AN ORGANIZED HEALTH CARE EDUCATION/TRAINING PROGRAM

## 2025-01-14 PROCEDURE — 2060000000 HC ICU INTERMEDIATE R&B

## 2025-01-14 PROCEDURE — 85025 COMPLETE CBC W/AUTO DIFF WBC: CPT

## 2025-01-14 RX ORDER — SODIUM CHLORIDE 0.9 % (FLUSH) 0.9 %
5-40 SYRINGE (ML) INJECTION PRN
Status: DISCONTINUED | OUTPATIENT
Start: 2025-01-14 | End: 2025-01-18 | Stop reason: HOSPADM

## 2025-01-14 RX ORDER — CLOPIDOGREL BISULFATE 75 MG/1
75 TABLET ORAL DAILY
Status: DISCONTINUED | OUTPATIENT
Start: 2025-01-14 | End: 2025-01-18 | Stop reason: HOSPADM

## 2025-01-14 RX ORDER — SODIUM CHLORIDE 9 MG/ML
INJECTION, SOLUTION INTRAVENOUS PRN
Status: DISCONTINUED | OUTPATIENT
Start: 2025-01-14 | End: 2025-01-18 | Stop reason: HOSPADM

## 2025-01-14 RX ORDER — POTASSIUM CHLORIDE 1500 MG/1
40 TABLET, EXTENDED RELEASE ORAL PRN
Status: ACTIVE | OUTPATIENT
Start: 2025-01-14 | End: 2025-01-16

## 2025-01-14 RX ORDER — POTASSIUM CHLORIDE 7.45 MG/ML
10 INJECTION INTRAVENOUS PRN
Status: ACTIVE | OUTPATIENT
Start: 2025-01-14 | End: 2025-01-16

## 2025-01-14 RX ORDER — MAGNESIUM SULFATE IN WATER 40 MG/ML
2000 INJECTION, SOLUTION INTRAVENOUS PRN
Status: DISCONTINUED | OUTPATIENT
Start: 2025-01-14 | End: 2025-01-17

## 2025-01-14 RX ORDER — ASPIRIN 81 MG/1
81 TABLET ORAL DAILY
Status: DISCONTINUED | OUTPATIENT
Start: 2025-01-14 | End: 2025-01-18 | Stop reason: HOSPADM

## 2025-01-14 RX ORDER — ACETAMINOPHEN 325 MG/1
650 TABLET ORAL EVERY 6 HOURS PRN
Status: DISCONTINUED | OUTPATIENT
Start: 2025-01-14 | End: 2025-01-18 | Stop reason: HOSPADM

## 2025-01-14 RX ORDER — GLUCAGON 1 MG/ML
1 KIT INJECTION PRN
Status: DISCONTINUED | OUTPATIENT
Start: 2025-01-14 | End: 2025-01-18 | Stop reason: HOSPADM

## 2025-01-14 RX ORDER — INSULIN LISPRO 100 [IU]/ML
0-4 INJECTION, SOLUTION INTRAVENOUS; SUBCUTANEOUS
Status: DISCONTINUED | OUTPATIENT
Start: 2025-01-14 | End: 2025-01-15

## 2025-01-14 RX ORDER — NICOTINE 21 MG/24HR
1 PATCH, TRANSDERMAL 24 HOURS TRANSDERMAL DAILY
Status: DISCONTINUED | OUTPATIENT
Start: 2025-01-14 | End: 2025-01-18 | Stop reason: HOSPADM

## 2025-01-14 RX ORDER — SODIUM CHLORIDE 0.9 % (FLUSH) 0.9 %
5-40 SYRINGE (ML) INJECTION EVERY 12 HOURS SCHEDULED
Status: DISCONTINUED | OUTPATIENT
Start: 2025-01-14 | End: 2025-01-18 | Stop reason: HOSPADM

## 2025-01-14 RX ORDER — DEXTROSE MONOHYDRATE 100 MG/ML
INJECTION, SOLUTION INTRAVENOUS CONTINUOUS PRN
Status: DISCONTINUED | OUTPATIENT
Start: 2025-01-14 | End: 2025-01-18 | Stop reason: HOSPADM

## 2025-01-14 RX ORDER — BUDESONIDE AND FORMOTEROL FUMARATE DIHYDRATE 80; 4.5 UG/1; UG/1
2 AEROSOL RESPIRATORY (INHALATION)
Status: DISCONTINUED | OUTPATIENT
Start: 2025-01-14 | End: 2025-01-14

## 2025-01-14 RX ORDER — ENOXAPARIN SODIUM 100 MG/ML
30 INJECTION SUBCUTANEOUS 2 TIMES DAILY
Status: DISCONTINUED | OUTPATIENT
Start: 2025-01-14 | End: 2025-01-14 | Stop reason: CLARIF

## 2025-01-14 RX ORDER — FAMOTIDINE 40 MG/1
40 TABLET, FILM COATED ORAL DAILY
Status: ON HOLD | COMMUNITY
End: 2025-01-22 | Stop reason: HOSPADM

## 2025-01-14 RX ORDER — IPRATROPIUM BROMIDE AND ALBUTEROL SULFATE 2.5; .5 MG/3ML; MG/3ML
1 SOLUTION RESPIRATORY (INHALATION)
Status: DISCONTINUED | OUTPATIENT
Start: 2025-01-14 | End: 2025-01-14

## 2025-01-14 RX ORDER — IPRATROPIUM BROMIDE AND ALBUTEROL SULFATE 2.5; .5 MG/3ML; MG/3ML
1 SOLUTION RESPIRATORY (INHALATION)
Status: DISCONTINUED | OUTPATIENT
Start: 2025-01-14 | End: 2025-01-15

## 2025-01-14 RX ORDER — PREDNISONE 20 MG/1
40 TABLET ORAL DAILY
Status: DISCONTINUED | OUTPATIENT
Start: 2025-01-16 | End: 2025-01-14

## 2025-01-14 RX ORDER — ONDANSETRON 2 MG/ML
4 INJECTION INTRAMUSCULAR; INTRAVENOUS EVERY 6 HOURS PRN
Status: DISCONTINUED | OUTPATIENT
Start: 2025-01-14 | End: 2025-01-18 | Stop reason: HOSPADM

## 2025-01-14 RX ORDER — ACETAMINOPHEN 650 MG/1
650 SUPPOSITORY RECTAL EVERY 6 HOURS PRN
Status: DISCONTINUED | OUTPATIENT
Start: 2025-01-14 | End: 2025-01-18 | Stop reason: HOSPADM

## 2025-01-14 RX ORDER — PREDNISONE 20 MG/1
40 TABLET ORAL DAILY
Status: DISCONTINUED | OUTPATIENT
Start: 2025-01-16 | End: 2025-01-16

## 2025-01-14 RX ORDER — ONDANSETRON 4 MG/1
4 TABLET, ORALLY DISINTEGRATING ORAL EVERY 8 HOURS PRN
Status: DISCONTINUED | OUTPATIENT
Start: 2025-01-14 | End: 2025-01-18 | Stop reason: HOSPADM

## 2025-01-14 RX ORDER — GUAIFENESIN 600 MG/1
600 TABLET, EXTENDED RELEASE ORAL 2 TIMES DAILY
Status: DISCONTINUED | OUTPATIENT
Start: 2025-01-14 | End: 2025-01-18 | Stop reason: HOSPADM

## 2025-01-14 RX ORDER — LISINOPRIL 10 MG/1
10 TABLET ORAL DAILY
Status: DISCONTINUED | OUTPATIENT
Start: 2025-01-14 | End: 2025-01-18 | Stop reason: HOSPADM

## 2025-01-14 RX ORDER — POLYETHYLENE GLYCOL 3350 17 G/17G
17 POWDER, FOR SOLUTION ORAL DAILY PRN
Status: DISCONTINUED | OUTPATIENT
Start: 2025-01-14 | End: 2025-01-18 | Stop reason: HOSPADM

## 2025-01-14 RX ORDER — BISACODYL 5 MG/1
5 TABLET, DELAYED RELEASE ORAL DAILY PRN
Status: DISCONTINUED | OUTPATIENT
Start: 2025-01-14 | End: 2025-01-18 | Stop reason: HOSPADM

## 2025-01-14 RX ORDER — ACETAMINOPHEN 650 MG/1
650 SUPPOSITORY RECTAL EVERY 6 HOURS PRN
Status: DISCONTINUED | OUTPATIENT
Start: 2025-01-14 | End: 2025-01-14

## 2025-01-14 RX ORDER — ENOXAPARIN SODIUM 100 MG/ML
30 INJECTION SUBCUTANEOUS 2 TIMES DAILY
Status: DISCONTINUED | OUTPATIENT
Start: 2025-01-14 | End: 2025-01-18 | Stop reason: HOSPADM

## 2025-01-14 RX ORDER — ACETAMINOPHEN 325 MG/1
650 TABLET ORAL EVERY 6 HOURS PRN
Status: DISCONTINUED | OUTPATIENT
Start: 2025-01-14 | End: 2025-01-14

## 2025-01-14 RX ORDER — ARFORMOTEROL TARTRATE 15 UG/2ML
15 SOLUTION RESPIRATORY (INHALATION)
Status: DISCONTINUED | OUTPATIENT
Start: 2025-01-14 | End: 2025-01-16

## 2025-01-14 RX ORDER — FUROSEMIDE 10 MG/ML
40 INJECTION INTRAMUSCULAR; INTRAVENOUS 2 TIMES DAILY
Status: COMPLETED | OUTPATIENT
Start: 2025-01-14 | End: 2025-01-14

## 2025-01-14 RX ORDER — INSULIN GLARGINE 100 [IU]/ML
20 INJECTION, SOLUTION SUBCUTANEOUS NIGHTLY
Status: DISCONTINUED | OUTPATIENT
Start: 2025-01-14 | End: 2025-01-16

## 2025-01-14 RX ORDER — BUDESONIDE 0.5 MG/2ML
0.5 INHALANT ORAL
Status: DISCONTINUED | OUTPATIENT
Start: 2025-01-14 | End: 2025-01-16

## 2025-01-14 RX ADMIN — BUDESONIDE 500 MCG: 0.5 INHALANT RESPIRATORY (INHALATION) at 21:29

## 2025-01-14 RX ADMIN — AZITHROMYCIN MONOHYDRATE 500 MG: 500 INJECTION, POWDER, LYOPHILIZED, FOR SOLUTION INTRAVENOUS at 02:11

## 2025-01-14 RX ADMIN — IPRATROPIUM BROMIDE AND ALBUTEROL SULFATE 1 DOSE: .5; 3 SOLUTION RESPIRATORY (INHALATION) at 21:28

## 2025-01-14 RX ADMIN — FUROSEMIDE 40 MG: 10 INJECTION, SOLUTION INTRAMUSCULAR; INTRAVENOUS at 18:17

## 2025-01-14 RX ADMIN — LISINOPRIL 10 MG: 10 TABLET ORAL at 09:19

## 2025-01-14 RX ADMIN — SODIUM CHLORIDE, PRESERVATIVE FREE 10 ML: 5 INJECTION INTRAVENOUS at 09:27

## 2025-01-14 RX ADMIN — IPRATROPIUM BROMIDE AND ALBUTEROL SULFATE 1 DOSE: .5; 3 SOLUTION RESPIRATORY (INHALATION) at 07:58

## 2025-01-14 RX ADMIN — SODIUM CHLORIDE, PRESERVATIVE FREE 10 ML: 5 INJECTION INTRAVENOUS at 21:39

## 2025-01-14 RX ADMIN — BUDESONIDE 500 MCG: 0.5 INHALANT RESPIRATORY (INHALATION) at 11:54

## 2025-01-14 RX ADMIN — SODIUM CHLORIDE, PRESERVATIVE FREE 10 ML: 5 INJECTION INTRAVENOUS at 00:41

## 2025-01-14 RX ADMIN — SODIUM CHLORIDE, PRESERVATIVE FREE 10 ML: 5 INJECTION INTRAVENOUS at 04:00

## 2025-01-14 RX ADMIN — ASPIRIN 81 MG: 81 TABLET, COATED ORAL at 09:19

## 2025-01-14 RX ADMIN — IPRATROPIUM BROMIDE AND ALBUTEROL SULFATE 1 DOSE: .5; 3 SOLUTION RESPIRATORY (INHALATION) at 11:59

## 2025-01-14 RX ADMIN — IPRATROPIUM BROMIDE AND ALBUTEROL SULFATE 1 DOSE: .5; 3 SOLUTION RESPIRATORY (INHALATION) at 15:25

## 2025-01-14 RX ADMIN — GUAIFENESIN 600 MG: 600 TABLET, MULTILAYER, EXTENDED RELEASE ORAL at 21:40

## 2025-01-14 RX ADMIN — SODIUM CHLORIDE, PRESERVATIVE FREE 10 ML: 5 INJECTION INTRAVENOUS at 01:58

## 2025-01-14 RX ADMIN — FUROSEMIDE 40 MG: 10 INJECTION, SOLUTION INTRAMUSCULAR; INTRAVENOUS at 09:20

## 2025-01-14 RX ADMIN — ENOXAPARIN SODIUM 30 MG: 100 INJECTION SUBCUTANEOUS at 21:41

## 2025-01-14 RX ADMIN — IPRATROPIUM BROMIDE AND ALBUTEROL SULFATE 1 DOSE: .5; 3 SOLUTION RESPIRATORY (INHALATION) at 03:48

## 2025-01-14 RX ADMIN — CLOPIDOGREL BISULFATE 75 MG: 75 TABLET ORAL at 09:19

## 2025-01-14 RX ADMIN — ACETAMINOPHEN 650 MG: 325 TABLET ORAL at 21:38

## 2025-01-14 RX ADMIN — INSULIN LISPRO 1 UNITS: 100 INJECTION, SOLUTION INTRAVENOUS; SUBCUTANEOUS at 04:01

## 2025-01-14 RX ADMIN — ENOXAPARIN SODIUM 30 MG: 100 INJECTION SUBCUTANEOUS at 09:19

## 2025-01-14 RX ADMIN — INSULIN GLARGINE 20 UNITS: 100 INJECTION, SOLUTION SUBCUTANEOUS at 04:01

## 2025-01-14 RX ADMIN — GUAIFENESIN 600 MG: 600 TABLET, MULTILAYER, EXTENDED RELEASE ORAL at 09:19

## 2025-01-14 RX ADMIN — ACETAMINOPHEN 650 MG: 325 TABLET ORAL at 09:19

## 2025-01-14 RX ADMIN — SODIUM CHLORIDE, PRESERVATIVE FREE 10 ML: 5 INJECTION INTRAVENOUS at 21:40

## 2025-01-14 RX ADMIN — SULFUR HEXAFLUORIDE 2 ML: 60.7; .19; .19 INJECTION, POWDER, LYOPHILIZED, FOR SUSPENSION INTRAVENOUS; INTRAVESICAL at 13:35

## 2025-01-14 RX ADMIN — ARFORMOTEROL TARTRATE 15 MCG: 15 SOLUTION RESPIRATORY (INHALATION) at 21:28

## 2025-01-14 RX ADMIN — WATER 40 MG: 1 INJECTION INTRAMUSCULAR; INTRAVENOUS; SUBCUTANEOUS at 13:56

## 2025-01-14 RX ADMIN — ARFORMOTEROL TARTRATE 15 MCG: 15 SOLUTION RESPIRATORY (INHALATION) at 11:54

## 2025-01-14 RX ADMIN — WATER 40 MG: 1 INJECTION INTRAMUSCULAR; INTRAVENOUS; SUBCUTANEOUS at 21:52

## 2025-01-14 RX ADMIN — WATER 40 MG: 1 INJECTION INTRAMUSCULAR; INTRAVENOUS; SUBCUTANEOUS at 04:01

## 2025-01-14 ASSESSMENT — PAIN SCALES - GENERAL
PAINLEVEL_OUTOF10: 0
PAINLEVEL_OUTOF10: 7
PAINLEVEL_OUTOF10: 0
PAINLEVEL_OUTOF10: 7

## 2025-01-14 ASSESSMENT — PAIN DESCRIPTION - LOCATION: LOCATION: BACK

## 2025-01-14 ASSESSMENT — ENCOUNTER SYMPTOMS
SHORTNESS OF BREATH: 1
COUGH: 1
VOMITING: 0
NAUSEA: 0
RHINORRHEA: 0
DIARRHEA: 0
ABDOMINAL PAIN: 0

## 2025-01-14 ASSESSMENT — PAIN DESCRIPTION - ORIENTATION: ORIENTATION: MID

## 2025-01-14 ASSESSMENT — PAIN DESCRIPTION - DESCRIPTORS: DESCRIPTORS: ACHING;DISCOMFORT

## 2025-01-14 NOTE — FLOWSHEET NOTE
Zithromax due. Pt has multiple allergies and expressed concerns r/t antibiotics. Son unable to verify if pt has had previous reactions to zithromax in past.      Allergies were reviewed per hospitalist; no allergy to zithromax noted in epic.     Will d/w pt's  to further clarify and give dose after d/w . Zithromax dose time adjusted on MAR to be given at a later time after d/w

## 2025-01-14 NOTE — FLOWSHEET NOTE
ABG unable to be obtained this AM per Ji RT; see previous notes.    RT called and d/w Ji RT; alternate RT to come attempt ABG when available.

## 2025-01-14 NOTE — PLAN OF CARE
Problem: Chronic Conditions and Co-morbidities  Goal: Patient's chronic conditions and co-morbidity symptoms are monitored and maintained or improved  Outcome: Progressing  Flowsheets (Taken 1/14/2025 0919)  Care Plan - Patient's Chronic Conditions and Co-Morbidity Symptoms are Monitored and Maintained or Improved: Monitor and assess patient's chronic conditions and comorbid symptoms for stability, deterioration, or improvement     Problem: Discharge Planning  Goal: Discharge to home or other facility with appropriate resources  Outcome: Progressing  Flowsheets (Taken 1/14/2025 0919)  Discharge to home or other facility with appropriate resources: Identify barriers to discharge with patient and caregiver     Problem: Pain  Goal: Verbalizes/displays adequate comfort level or baseline comfort level  Outcome: Progressing     Problem: Hematologic - Adult  Goal: Maintains hematologic stability  Outcome: Progressing  Flowsheets (Taken 1/14/2025 0919)  Maintains hematologic stability: Assess for signs and symptoms of bleeding or hemorrhage     Problem: Respiratory - Adult  Goal: Achieves optimal ventilation and oxygenation  Outcome: Progressing     Problem: Safety - Adult  Goal: Free from fall injury  Outcome: Progressing     Problem: ABCDS Injury Assessment  Goal: Absence of physical injury  Outcome: Progressing     Problem: Cardiovascular - Adult  Goal: Maintains optimal cardiac output and hemodynamic stability  Outcome: Progressing

## 2025-01-14 NOTE — PROGRESS NOTES
Patient Name: Cassandra Bowser  : 1975 49 y.o.  MRN: [id number]  ED Room #: ED-0010/10     Chief complaint:   Chief Complaint   Patient presents with    Cough    Shortness of Breath     Cough and sob, x 3 wks, states her doctor wanted to admit her today but there were no rooms, pt has multiple other complaints     Hospital Problem/Diagnosis:   Hospital Problems             Last Modified POA    * (Principal) Acute on chronic respiratory failure with hypercapnia 2025 Yes         O2 Flow Rate:O2 Device: Nasal cannula O2 Flow Rate (L/min): 4 L/min (if applicable)  Cardiac Rhythm:   (if applicable)  Active LDA's:   Peripheral IV 25 Left Antecubital (Active)            How does patient ambulate? Wheelchair    2. How does patient take pills? Whole with Water    3. Is patient alert? Alert    4. Is patient oriented? To Person, To Place, To Time, and To Situation    5.   Patient arrived from:  home  Facility Name: ___________________________________________    6. If patient is disoriented or from a Skill Nursing Facility has family been notified of admission? No    7. Patient belongings? Belongings: Clothing    Disposition of belongings? Kept with Patient     8. Any specific patient or family belongings/needs/dynamics?   a.     9. Miscellaneous comments/pending orders?  a.       If there are any additional questions please reach out to the Emergency Department.

## 2025-01-14 NOTE — FLOWSHEET NOTE
Pt newly admitted to [facility] but mostly asleep, had been refusing BIPAP mask, and unable to answer admission questions.      to bedside and able to convince pt to wear BIPAP mask; BIPAP back on patient.     Careplan/active orders d/w  and family at bedside; admission completed per  and daughter at bedside.

## 2025-01-14 NOTE — PROGRESS NOTES
Pharmacy Home Medication Reconciliation Note    A medication reconciliation has been completed for Cassandra Bowser 1975    Pharmacy: [address]  Information provided by: patient and     The patient's home medication list is as follows:  Current Facility-Administered Medications on File Prior to Encounter   Medication Dose Route Frequency Provider Last Rate Last Admin    ticagrelor (BRILINTA) tablet 90 mg  90 mg Oral BID Attila Cantu MD         Current Outpatient Medications on File Prior to Encounter   Medication Sig Dispense Refill    insulin glargine (LANTUS SOLOSTAR) 100 UNIT/ML injection pen Inject 45 Units into the skin nightly      insulin lispro, 1 Unit Dial, (HUMALOG KWIKPEN) 100 UNIT/ML SOPN Inject 33-47 Units into the skin 3 times daily (before meals)      magnesium oxide (MAG-OX) 400 (240 Mg) MG tablet Take 1 tablet by mouth daily      metoprolol succinate (TOPROL XL) 50 MG extended release tablet Take 1.5 tablets by mouth 2 times daily      ondansetron (ZOFRAN-ODT) 4 MG disintegrating tablet Take 1 tablet by mouth every 4 hours as needed for Nausea or Vomiting      predniSONE (DELTASONE) 10 MG tablet Take 1 tablet by mouth See Admin Instructions      fluticasone-umeclidin-vilant (TRELEGY ELLIPTA) 100-62.5-25 MCG/ACT AEPB inhaler Inhale 1 puff into the lungs daily 2 each 0    ipratropium 0.5 mg-albuterol 2.5 mg (DUONEB) 0.5-2.5 (3) MG/3ML SOLN nebulizer solution Inhale 3 mLs into the lungs every 4 hours 360 mL 0    fluticasone (FLONASE) 50 MCG/ACT nasal spray 2 sprays by Each Nostril route daily 16 g 5    furosemide (LASIX) 20 MG tablet TAKE 1 TABLET BY MOUTH 2 TIMES DAILY (Patient taking differently: Take 2 tablets by mouth 2 times daily) 60 tablet 0    lisinopril (PRINIVIL;ZESTRIL) 10 MG tablet TAKE 1 TABLET BY MOUTH EVERY DAY (Patient taking differently: Take 1 tablet by mouth daily) 90 tablet 3    clopidogrel (PLAVIX) 75 MG tablet TAKE 1 TABLET BY  kit by Does not apply route daily as needed (wheezing) Dx: COPD   ICD-10: J44.9 1 kit 1    Nebulizers (COMPRESSOR/NEBULIZER) MISC To be used with albuterol 0.083% - Dx: COPD   ICD-10: J44.9 1 each 0       Patient is no longer taking Anoro Ellipta, Breztri, dapsone, Lopressor (taking Toprol 75 mg BID), nicotine inhaler for cessation (still smoking 2-4 packs a day), omeprazole, ranolazine, or Crestor.    Of note, patient states she took AM meds today but was unable to use inhalers the past few days due to not being able to breathe in. Patient is unable to use nebulizer because of mechanical issue.    Timing of last doses updated.    Thank you,  Stacy Eaton, JIMhT

## 2025-01-14 NOTE — FLOWSHEET NOTE
01/14/25 0155   Vitals   Pulse 67   Heart Rate Source Radial   Cardiac Rhythm Sinus rhythm with PAC   Oxygen Therapy   SpO2 96 %   Pulse Oximetry Type Continuous   Pulse Oximeter Device Mode Continuous   Pulse Oximeter Device Location Finger   O2 Device Nasal cannula   O2 Flow Rate (L/min) 5 L/min     PMH narcolepsy, and family reports pt pulls off her oxygen overnight continuously at home. Pt pulling BiPAP off and refusing for staff to place BIPAP back on. Pt agreeable to n/c; 5L n/c placed on patient. Son at bedside calling pt's  to come to bedside to assist.  on his way.

## 2025-01-14 NOTE — CONSULTS
Pulmonary and Critical Care Medicine    CC: SOB    Date: 2025  Admit Date:  2025  Reason for Consultation: acute on chronic hypercapnic / hypoxic respiratory failure  Consult Requesting Physician: Partha Andre MD     HPI     This is a 50 y/o F w/ a hx of COPD, chronic prednisone, tobacco use, chronic hypoxic respiratory failure 2L, probable KATTY, obesity, GERD, CAD who presented with SOB. Patient was admitted to the medical floor with acute hypoxic/hypercapnic respiratory failure, started on bipap, she was also started on steroids and nebs for COPD exacerbation. Her Influenza, COVID-19 and RSV was negative, CT-PE was negative for PE, however there is mosaic attentuations, similar to previous CT. Today her ABG was worsening while on bipap thus pulmonary was consulted, on my exam she was in room 3368 she was on bipap 10/5 50%, had significant mask leak. She was awake and oriented. Endorsed cough, no chest pain.     ROS: unable to obtain complete ROS given she is on bipap    PMH:  has a past medical history of Allergic, Anemia, Asthma, CAD (coronary artery disease), Chronic back pain, Chronic kidney disease, COPD (chronic obstructive pulmonary disease) (HCC), Diabetes mellitus (HCC), GERD (gastroesophageal reflux disease), Hyperlipidemia, Hypertension, Narcolepsy, Pancreatitis, Pneumonia, Psychiatric problem, and Ulcer.   PSH:  has a past surgical history that includes Tubal ligation;  section; eye surgery; Cholecystectomy, laparoscopic (10/5/2011); Cardiac catheterization (2010, 2011); and Hysterectomy.    SH:  reports that she has been smoking cigarettes. She has never been exposed to tobacco smoke. She has never used smokeless tobacco. She reports that she does not drink alcohol and does not use drugs.   FH: family history includes Heart Disease in her brother, father, mother, and sister; Sleep Apnea in her brother.    Allergies: Sulfa antibiotics, Tramadol, Ultracet      Recent Labs     01/13/25  1917 01/14/25  0444   WBC 14.2* 13.0*   HGB 9.0* 8.7*   HCT 30.5* 28.6*   MCV 84.6 84.2    149     Lab Results   Component Value Date/Time    PROTIME 12.4 03/18/2011 11:45 PM    INR 1.13 03/18/2011 11:45 PM     Lab Results   Component Value Date/Time    UHU2IYC 43.0 01/14/2025 06:11 AM    BEART 14.8 01/14/2025 06:11 AM    I8LJGTCO 99.6 01/14/2025 06:11 AM    PHART 7.320 01/14/2025 06:11 AM    UBU7DXX 83.4 01/14/2025 06:11 AM    PO2ART 102.0 01/14/2025 06:11 AM    SDY5DPR 102.0 01/14/2025 06:11 AM       Intake/Output Summary (Last 24 hours) at 1/14/2025 1137  Last data filed at 1/14/2025 0834  Gross per 24 hour   Intake 426.76 ml   Output 350 ml   Net 76.76 ml      In: 426.8 [I.V.:30]  Out: 350      IMAGES: Reviewed       ASSESSMENT AND PLAN:     Acute on chronic hypoxic / hypercapnic respiratory failure  AE-COPD  Tobacco use  Probable KATTY + OHS  Obesity  Chronic prednisone use   Leukocytosis     Recommendations:  - I have adjusted the bipap to 18/8, 30%, has tidal volumes avg 550s  - mask also adjusted  - obtain ABG in 1 hr  - change Symbicort to brovana/pulmicort BID  - continue duoneb q 4 hrs  - continue solu-medrol 40 q  hrs   - obtain complete RVP  - continue azithromycin   - keep NPO      at the bedside, discussed with hospitalist and RT.     Critical Care time 31 (excluding procedures) - patient is at risk of significant morbidity / mortality from respiratory failure     Yoni Kerr MD  Pulmonary and Critical Care Medicine   [facility]

## 2025-01-14 NOTE — PLAN OF CARE
Problem: Pain  Goal: Verbalizes/displays adequate comfort level or baseline comfort level  Outcome: Progressing     Problem: Hematologic - Adult  Goal: Maintains hematologic stability  Outcome: Progressing     Problem: Respiratory - Adult  Goal: Achieves optimal ventilation and oxygenation  Outcome: Progressing     Patient admitted from emergency department via stretcher on monitor. Transferred to bed. Placed on telemetry monitor. Vital signs obtained. Orders reviewed and acknowledged. Oriented to room and environment. Pt's son at bedside. Questions answered. Bed placed in low position. Call light explained and within reach. Home med rec done per pharmacy. Pt attempting to sleep.  is on his way now to come stay with patient; will do all admission questions with  for accuracy. See all flowsheets.      01/14/25 0032   Vitals   Temp 97.8 °F (36.6 °C)   Temp Source Axillary   Pulse 62   Heart Rate Source Brachial;Radial   Respirations 19   BP (!) 151/72   MAP (Calculated) 98   BP Location Right lower arm   BP Upper/Lower Lower   BP Method Automatic   Patient Position Semi fowlers   Cardiac Rhythm Sinus rhythm with PAC   Pain Assessment   Pain Assessment 0-10   Pain Level 0   Opioid-Induced Sedation   POSS Score 1   Oxygen Therapy   SpO2 95 %   Pulse Oximetry Type Continuous   Pulse Oximeter Device Mode Continuous   Pulse Oximeter Device Location Finger   O2 Device PAP (positive airway pressure)  (10/5)   FiO2  50 %   Height and Weight   Height 1.562 m (5' 1.5\")   Weight - Scale 121 kg (266 lb 12.1 oz)   Weight Method Bed scale   BSA (Calculated - sq m) 2.29 sq meters   BMI (Calculated) 49.7

## 2025-01-14 NOTE — PLAN OF CARE
Problem: Hematologic - Adult  Goal: Maintains hematologic stability  Outcome: Progressing     Problem: Respiratory - Adult  Goal: Achieves optimal ventilation and oxygenation  Outcome: Progressing      01/14/25 0350   Vitals   Temp 97.5 °F (36.4 °C)   Temp Source Axillary   Pulse 78   Heart Rate Source Brachial;Radial   Respirations 18   BP (!) 142/69   MAP (Calculated) 93   BP Location Right lower arm   BP Upper/Lower Lower   BP Method Automatic   Patient Position Right side   Oxygen Therapy   SpO2 97 %   Pulse Oximetry Type Continuous   Pulse Oximeter Device Mode Continuous   Pulse Oximeter Device Location Finger   O2 Device PAP (positive airway pressure)  (10/5)   FiO2  50 %

## 2025-01-14 NOTE — H&P
V2.0  History and Physical      Name:  Cassandra Bowser /Age/Sex: 1975  (49 y.o. female)   MRN & CSN:  [id number] & [id number] Encounter Date/Time: 2025 10:39 PM EST   Location:  [facility] PCP: Cody Solano MD       Hospital Day: 1    Assessment and Plan:   Cassandra Bowser is a 49 y.o. female  who presents with       Plan:  Acute on Chronic Respiratory Failure with Hypoxia and Hypercapnia   Secondary to COPD exacerbation, suspect KATTY  Bipap 10/5 started in ER, repeat ABG pCO2 climbing,  she is tolerating bipap  Repeat ABG in 1 hour  IV azithomycin,   Pulmonary consult    2.  COPD exacerbation   Follows with Pulmonary outpatient but has not had recent PFT that were requested by pulmonary, several missed appointments  IV azithromycin,  she has multiple allergies  IV steroids.     3. Lower extremity Edema  She has 4+ edema to bilateral LE up to lower abdomen, concerned for CHF  Last echo  EF 55%  2D echo    IV lasix x 2 doses     4. Hypertension  BP stable continue home meds    5. Type II DM  Sliding scale insulin,   lantus 20 units daily    6. Anemia  Check iron, tibc, ferritin  No bleeding per patient, monitor stools  Trend hgb, hgb 9.0  was 10.6 23    7. Obesity BMI 46    8. Tobacco Dependent  3 to 4 packs of cigarettes a day     Disposition:   Current Living situation: home  Expected Disposition: home  Estimated D/C: 3    Diet Diet NPO   DVT Prophylaxis [x] Lovenox, []  Heparin, [] SCDs, [] Ambulation,  [] Eliquis, [] Xarelto, [] Coumadin   Code Status Prior   Surrogate Decision Maker/ POA      Personally reviewed Lab Studies and Imaging     Discussed management of the case with Manisha ENRIQUE in ER who recommended admission       History from:     patient, spouse    History of Present Illness:     Chief Complaint: shortness of breath, lethargy  Cassandra Bowser is a 49 y.o. female with pmh of COPD, CAD, obesity who presents with shortness of breath, lethargy.  Patient had

## 2025-01-14 NOTE — ED PROVIDER NOTES
[facility] EMERGENCY DEPARTMENT  EMERGENCY DEPARTMENT ENCOUNTER        Pt Name: Cassandra Bowser  MRN: [id number]  Birthdate 1975  Date of evaluation: 1/13/2025  Provider: Manisha Hair PA-C  PCP: Cody Solano MD  Note Started: 12:09 AM EST 1/14/25       I have seen and evaluated this patient with my supervising physician Aislinn Wong MD.      CHIEF COMPLAINT       Chief Complaint   Patient presents with    Cough    Shortness of Breath     Cough and sob, x 3 wks, states her doctor wanted to admit her today but there were no rooms, pt has multiple other complaints       HISTORY OF PRESENT ILLNESS: 1 or more Elements     History From: Patient, family members at  bedside  Limitations to history : None    Cassandra Bowser is a 49 y.o. female who presents to the emergency department today for evaluation for concerns of shortness of breath.  Patient reports that she has a history of COPD and is on 2 L of oxygen at baseline.  The patient reports that over the past few weeks she has had increasing shortness of breath as well as a cough.  She reports that she was seen at an outside facility and was given Zithromax.  She reports that she is also on high doses of steroids.  She reports that she continues to feel short of breath, continues to cough.  She reports that her cough is productive of sputum.  She is on 2 L of oxygen at baseline however the past couple days she has had increasing oxygen uses due to her shortness of breath.  Patient reports that she has a pain to the left side of her chest with radiation towards her left shoulder.  Seems to be worse with movement and coughing.  She does have a history of stents in place.  She denies any vomiting.  No diarrhea.  She reports that she saw Dr. Chew, her pulmonologist earlier today and recommend that she come to the emergency room to be admitted.      Nursing Notes were all reviewed and agreed with or any disagreements were addressed in the  1/13/25 1931)   iopamidol (ISOVUE-370) 76 % injection 75 mL (75 mLs IntraVENous Given 1/13/25 2039)   HYDROcodone-acetaminophen (NORCO) 5-325 MG per tablet 2 tablet (2 tablets Oral Given 1/13/25 2210)                 Is this patient to be included in the SEP-1 Core Measure due to severe sepsis or septic shock?   No   Exclusion criteria - the patient is NOT to be included for SEP-1 Core Measure due to:    Chronic Conditions affecting care:    has a past medical history of Allergic, Anemia, Asthma, CAD (coronary artery disease), Chronic back pain, Chronic kidney disease, COPD (chronic obstructive pulmonary disease) (HCC), Diabetes mellitus (HCC), GERD (gastroesophageal reflux disease), Hyperlipidemia, Hypertension, Pancreatitis (10/2011), Pneumonia (2008), Psychiatric problem, and Ulcer.    CONSULTS: (Who and What was discussed)  None      Social Determinants Significantly Affecting Health : None    Records Reviewed (External and Source) previous pulmonology visit earlier today    CC/HPI Summary, DDx, ED Course, and Reassessment: Briefly, this is a 49-year-old female who presents to the emergency department today for evaluation for concerns of shortness of breath.  Patient that she has a history of COPD, continues to smoke, she is on 2 L of oxygen continuously but states that she has had increasing oxygen demands.  She has had shortness of breath and cough x 3 weeks.  She is already on steroids.    On examination she is dyspneic with conversation, she is tachypneic, and diminished aeration with wheezing throughout all lung fields    Disposition Considerations (tests considered but not done, Admit vs D/C, Shared Decision Making, Pt Expectation of Test or Tx.):    EKG seconded by my attending will please see her note for further details.    CBC shows leukocytosis of 14.2 which is likely due to recent steroid use.  Her CMP shows a glucose of 285 however there is no anion gap.  Initial troponin is elevated at 32 which is

## 2025-01-14 NOTE — FLOWSHEET NOTE
Pt's daughter at bedside and reports pt \"has always been a deep sleeper her whole life\" and states pt has narcolepsy.     Pt awake and up to BSC to urinate; periwipes used and pt back to bed. Pt agreeable to have ABG drawn.     RT called and came to bedside to attempt ABG. X1 unsuccessful attempt. Ji RT to have alternate RT come attempt.

## 2025-01-14 NOTE — ED PROVIDER NOTES
[facility] Emergency Department      Pt Name: Cassandra Bowser  MRN: [id number]  Birthdate 1975  Date of evaluation: 2025  Provider: SALONI ZUÑIGA MD  I personally saw Cassandra Bowser and made and approved the management plan with the advanced practice provider.  I take responsibility for the patient management.     HPI: Cassandra Bowser presented with   Chief Complaint   Patient presents with    Cough    Shortness of Breath     Cough and sob, x 3 wks, states her doctor wanted to admit her today but there were no rooms, pt has multiple other complaints     Cassandra Bowser has a past medical history of Allergic, Anemia, Asthma, CAD (coronary artery disease), Chronic back pain, Chronic kidney disease, COPD (chronic obstructive pulmonary disease) ([facility]), Diabetes mellitus (), GERD (gastroesophageal reflux disease), Hyperlipidemia, Hypertension, Pancreatitis (10/2011), Pneumonia (), Psychiatric problem, and Ulcer.  She has a past surgical history that includes Tubal ligation;  section; eye surgery; Cholecystectomy, laparoscopic (10/5/2011); Cardiac catheterization (2010, 2011); and Hysterectomy.    Current Facility-Administered Medications on File Prior to Encounter   Medication Dose Route Frequency Provider Last Rate Last Admin    ticagrelor (BRILINTA) tablet 90 mg  90 mg Oral BID Attila Cantu MD         Current Outpatient Medications on File Prior to Encounter   Medication Sig Dispense Refill    fluticasone-umeclidin-vilant (TRELEGY ELLIPTA) 100-62.5-25 MCG/ACT AEPB inhaler Inhale 1 puff into the lungs daily 2 each 0    Budeson-Glycopyrrol-Formoterol (BREZTRI AEROSPHERE) 160-9-4.8 MCG/ACT AERO Inhale 2 puffs into the lungs 2 times daily 1 each 5    dapsone 100 MG tablet Take 0.5 tablets by mouth 2 times daily 60 tablet 5    ipratropium 0.5 mg-albuterol 2.5 mg (DUONEB) 0.5-2.5 (3) MG/3ML SOLN nebulizer solution Inhale 3 mLs into the lungs every 4 hours 360 mL 0    ANORO ELLIPTA    BMI 46.10 kg/m²   Constitutional:  49 y.o. female alert, cooperative  HENT:  Atraumatic scalp, mucous membranes moist  Eyes:   Conjunctiva clear, no icterus  Neck:  Supple, no visible JVD, no signs of injury  Cardiovascular:  Regular, no rubs  Thorax & Lungs:  No accessory muscle usage, rhonchi, wheezes  Abdomen:  Soft, non distended, NT  Back:  No deformity  Genitalia:  Deferred  Rectal:  Deferred  Extremities:  No cyanosis, adequate perfusion  Skin:  Exposed areas warm, dry  Neurologic:  Alert, no slurred speech  Psychiatric:  Affect appropriate    MEDICAL DECISION MAKING:  Briefly, this is an 49 y.o.female who presented with shortness of breath, not getting better with steroids with hx of COPD.  Increased O2 requirement from her baseline.  Followed by Dr Chew, sent here for admission.  Findings of hypercapnea, respiratory acidosis with bronchospasm.  We initiated bipap.  She is mentating normally.  Will add abx, continue steroids and bronchodilators.  Mild troponin elevation, no significant EKG change.  Plan is to admit for further care.     Heart Score for chest pain patients  History: Moderately Suspicious  ECG: Normal  Patient Age: > 45 and < 65 years  *Risk factors for Atherosclerotic disease: Cigarette smoking, Diabetes Mellitus, Hypercholesterolemia, Hypertension, Obesity, Positive family History, Coronary Artery Disease  Risk Factors: > 3 Risk factors or history of atherosclerotic disease*  Troponin: > 1 and < 3X normal limit  Heart Score Total: 5    For further details of Cassandra Bowser's Emergency Department encounter, please see documentation by advanced practice provider KLAUS Nunez.     Labs Reviewed   CBC WITH AUTO DIFFERENTIAL - Abnormal; Notable for the following components:       Result Value    WBC 14.2 (*)     RBC 3.61 (*)     Hemoglobin 9.0 (*)     Hematocrit 30.5 (*)     MCH 25.1 (*)     MCHC 29.6 (*)     RDW 18.3 (*)     Neutrophils Absolute 13.4 (*)     Lymphocytes Absolute

## 2025-01-14 NOTE — RT PROTOCOL NOTE
RT Inhaler-Nebulizer Bronchodilator Protocol Note    There is a bronchodilator order in the chart from a provider indicating to follow the RT Bronchodilator Protocol and there is an “Initiate RT Inhaler-Nebulizer Bronchodilator Protocol” order as well (see protocol at bottom of note).    CXR Findings:  XR CHEST PORTABLE    Result Date: 1/13/2025  Cardiomegaly. No focal airspace consolidation.       The findings from the last RT Protocol Assessment were as follows:   History Pulmonary Disease: Chronic pulmonary disease  Respiratory Pattern: Dyspnea on exertion or RR 21-25 bpm  Breath Sounds: Intermittent or unilateral wheezes  Cough: Strong, spontaneous, non-productive  Indication for Bronchodilator Therapy: Decreased or absent breath sounds, Wheezing associated with pulm disorder  Bronchodilator Assessment Score: 8    Aerosolized bronchodilator medication orders have been revised according to the RT Inhaler-Nebulizer Bronchodilator Protocol below.    Respiratory Therapist to perform RT Therapy Protocol Assessment initially then follow the protocol.  Repeat RT Therapy Protocol Assessment PRN for score 0-3 or on second treatment, BID, and PRN for scores above 3.    No Indications - adjust the frequency to every 6 hours PRN wheezing or bronchospasm, if no treatments needed after 48 hours then discontinue using Per Protocol order mode.     If indication present, adjust the RT bronchodilator orders based on the Bronchodilator Assessment Score as indicated below.  Use Inhaler orders unless patient has one or more of the following: on home nebulizer, not able to hold breath for 10 seconds, is not alert and oriented, cannot activate and use MDI correctly, or respiratory rate 25 breaths per minute or more, then use the equivalent nebulizer order(s) with same Frequency and PRN reasons based on the score.  If a patient is on this medication at home then do not decrease Frequency below that used at home.    0-3 - enter or  revise RT bronchodilator order(s) to equivalent RT Bronchodilator order with Frequency of every 4 hours PRN for wheezing or increased work of breathing using Per Protocol order mode.        4-6 - enter or revise RT Bronchodilator order(s) to two equivalent RT bronchodilator orders with one order with BID Frequency and one order with Frequency of every 4 hours PRN wheezing or increased work of breathing using Per Protocol order mode.        7-10 - enter or revise RT Bronchodilator order(s) to two equivalent RT bronchodilator orders with one order with TID Frequency and one order with Frequency of every 4 hours PRN wheezing or increased work of breathing using Per Protocol order mode.       11-13 - enter or revise RT Bronchodilator order(s) to one equivalent RT bronchodilator order with QID Frequency and an Albuterol order with Frequency of every 4 hours PRN wheezing or increased work of breathing using Per Protocol order mode.      Greater than 13 - enter or revise RT Bronchodilator order(s) to one equivalent RT bronchodilator order with every 4 hours Frequency and an Albuterol order with Frequency of every 2 hours PRN wheezing or increased work of breathing using Per Protocol order mode.     RT to enter RT Home Evaluation for COPD & MDI Assessment order using Per Protocol order mode.    Electronically signed by Ji Logan RCP on 1/14/2025 at 1:19 AM

## 2025-01-14 NOTE — FLOWSHEET NOTE
Pt in ED. Called ED and obtained report now from Dionisio. Dionisio will contact RT for pt transport to room 368, 3tower

## 2025-01-15 LAB
ANION GAP SERPL CALCULATED.3IONS-SCNC: 9 MMOL/L (ref 3–16)
BASE EXCESS BLDV CALC-SCNC: 16.1 MMOL/L (ref -3–3)
BASOPHILS # BLD: 0 K/UL (ref 0–0.2)
BASOPHILS NFR BLD: 0.2 %
BUN SERPL-MCNC: 22 MG/DL (ref 7–20)
CALCIUM SERPL-MCNC: 9 MG/DL (ref 8.3–10.6)
CHLORIDE SERPL-SCNC: 96 MMOL/L (ref 99–110)
CO2 BLDV-SCNC: 90 MMOL/L
CO2 SERPL-SCNC: 38 MMOL/L (ref 21–32)
COHGB MFR BLDV: 6.6 % (ref 0–1.5)
CREAT SERPL-MCNC: 0.5 MG/DL (ref 0.6–1.1)
DEPRECATED RDW RBC AUTO: 18.6 % (ref 12.4–15.4)
EOSINOPHIL # BLD: 0 K/UL (ref 0–0.6)
EOSINOPHIL NFR BLD: 0 %
GFR SERPLBLD CREATININE-BSD FMLA CKD-EPI: >90 ML/MIN/{1.73_M2}
GLUCOSE BLD-MCNC: 242 MG/DL (ref 70–99)
GLUCOSE BLD-MCNC: 401 MG/DL (ref 70–99)
GLUCOSE BLD-MCNC: 416 MG/DL (ref 70–99)
GLUCOSE BLD-MCNC: 433 MG/DL (ref 70–99)
GLUCOSE SERPL-MCNC: 166 MG/DL (ref 70–99)
HCO3 BLDV-SCNC: 39 MMOL/L (ref 23–29)
HCT VFR BLD AUTO: 27.6 % (ref 36–48)
HGB BLD-MCNC: 8.4 G/DL (ref 12–16)
LYMPHOCYTES # BLD: 0.4 K/UL (ref 1–5.1)
LYMPHOCYTES NFR BLD: 4.6 %
MCH RBC QN AUTO: 25.2 PG (ref 26–34)
MCHC RBC AUTO-ENTMCNC: 30.3 G/DL (ref 31–36)
MCV RBC AUTO: 83.2 FL (ref 80–100)
METHGB MFR BLDV: 0.5 %
MONOCYTES # BLD: 0.4 K/UL (ref 0–1.3)
MONOCYTES NFR BLD: 4.1 %
NEUTROPHILS # BLD: 8.5 K/UL (ref 1.7–7.7)
NEUTROPHILS NFR BLD: 91.1 %
O2 CT VFR BLDV CALC: 12 VOL %
O2 THERAPY: ABNORMAL
PCO2 BLDV: 39.7 MMHG (ref 40–50)
PERFORMED ON: ABNORMAL
PH BLDV: 7.6 [PH] (ref 7.35–7.45)
PLATELET # BLD AUTO: 142 K/UL (ref 135–450)
PMV BLD AUTO: 9.5 FL (ref 5–10.5)
PO2 BLDV: 190 MMHG (ref 25–40)
POTASSIUM SERPL-SCNC: 4.2 MMOL/L (ref 3.5–5.1)
RBC # BLD AUTO: 3.32 M/UL (ref 4–5.2)
SAO2 % BLDV: 100 %
SODIUM SERPL-SCNC: 143 MMOL/L (ref 136–145)
WBC # BLD AUTO: 9.3 K/UL (ref 4–11)

## 2025-01-15 PROCEDURE — 36415 COLL VENOUS BLD VENIPUNCTURE: CPT

## 2025-01-15 PROCEDURE — 94761 N-INVAS EAR/PLS OXIMETRY MLT: CPT

## 2025-01-15 PROCEDURE — 2700000000 HC OXYGEN THERAPY PER DAY

## 2025-01-15 PROCEDURE — 2500000003 HC RX 250 WO HCPCS: Performed by: NURSE PRACTITIONER

## 2025-01-15 PROCEDURE — 99233 SBSQ HOSP IP/OBS HIGH 50: CPT | Performed by: STUDENT IN AN ORGANIZED HEALTH CARE EDUCATION/TRAINING PROGRAM

## 2025-01-15 PROCEDURE — 2580000003 HC RX 258: Performed by: NURSE PRACTITIONER

## 2025-01-15 PROCEDURE — 97535 SELF CARE MNGMENT TRAINING: CPT

## 2025-01-15 PROCEDURE — 82803 BLOOD GASES ANY COMBINATION: CPT

## 2025-01-15 PROCEDURE — 97110 THERAPEUTIC EXERCISES: CPT

## 2025-01-15 PROCEDURE — 97162 PT EVAL MOD COMPLEX 30 MIN: CPT

## 2025-01-15 PROCEDURE — 80048 BASIC METABOLIC PNL TOTAL CA: CPT

## 2025-01-15 PROCEDURE — 6370000000 HC RX 637 (ALT 250 FOR IP): Performed by: NURSE PRACTITIONER

## 2025-01-15 PROCEDURE — 6360000002 HC RX W HCPCS: Performed by: NURSE PRACTITIONER

## 2025-01-15 PROCEDURE — 6360000002 HC RX W HCPCS: Performed by: INTERNAL MEDICINE

## 2025-01-15 PROCEDURE — 97166 OT EVAL MOD COMPLEX 45 MIN: CPT

## 2025-01-15 PROCEDURE — 6370000000 HC RX 637 (ALT 250 FOR IP): Performed by: INTERNAL MEDICINE

## 2025-01-15 PROCEDURE — 6360000002 HC RX W HCPCS: Performed by: STUDENT IN AN ORGANIZED HEALTH CARE EDUCATION/TRAINING PROGRAM

## 2025-01-15 PROCEDURE — 2060000000 HC ICU INTERMEDIATE R&B

## 2025-01-15 PROCEDURE — 2500000003 HC RX 250 WO HCPCS: Performed by: STUDENT IN AN ORGANIZED HEALTH CARE EDUCATION/TRAINING PROGRAM

## 2025-01-15 PROCEDURE — 85025 COMPLETE CBC W/AUTO DIFF WBC: CPT

## 2025-01-15 PROCEDURE — 94640 AIRWAY INHALATION TREATMENT: CPT

## 2025-01-15 RX ORDER — IPRATROPIUM BROMIDE AND ALBUTEROL SULFATE 2.5; .5 MG/3ML; MG/3ML
1 SOLUTION RESPIRATORY (INHALATION) EVERY 4 HOURS PRN
Status: DISCONTINUED | OUTPATIENT
Start: 2025-01-15 | End: 2025-01-18 | Stop reason: HOSPADM

## 2025-01-15 RX ORDER — INSULIN LISPRO 100 [IU]/ML
10 INJECTION, SOLUTION INTRAVENOUS; SUBCUTANEOUS ONCE
Status: COMPLETED | OUTPATIENT
Start: 2025-01-15 | End: 2025-01-15

## 2025-01-15 RX ORDER — INSULIN LISPRO 100 [IU]/ML
0-8 INJECTION, SOLUTION INTRAVENOUS; SUBCUTANEOUS
Status: DISCONTINUED | OUTPATIENT
Start: 2025-01-15 | End: 2025-01-18 | Stop reason: HOSPADM

## 2025-01-15 RX ORDER — FUROSEMIDE 10 MG/ML
40 INJECTION INTRAMUSCULAR; INTRAVENOUS 2 TIMES DAILY
Status: COMPLETED | OUTPATIENT
Start: 2025-01-15 | End: 2025-01-15

## 2025-01-15 RX ORDER — INSULIN LISPRO 100 [IU]/ML
15 INJECTION, SOLUTION INTRAVENOUS; SUBCUTANEOUS
Status: DISCONTINUED | OUTPATIENT
Start: 2025-01-15 | End: 2025-01-18 | Stop reason: HOSPADM

## 2025-01-15 RX ORDER — INSULIN LISPRO 100 [IU]/ML
5 INJECTION, SOLUTION INTRAVENOUS; SUBCUTANEOUS
Status: DISCONTINUED | OUTPATIENT
Start: 2025-01-15 | End: 2025-01-15

## 2025-01-15 RX ORDER — ACETAZOLAMIDE 500 MG/5ML
250 INJECTION, POWDER, LYOPHILIZED, FOR SOLUTION INTRAVENOUS ONCE
Status: COMPLETED | OUTPATIENT
Start: 2025-01-15 | End: 2025-01-15

## 2025-01-15 RX ORDER — IPRATROPIUM BROMIDE AND ALBUTEROL SULFATE 2.5; .5 MG/3ML; MG/3ML
1 SOLUTION RESPIRATORY (INHALATION)
Status: DISCONTINUED | OUTPATIENT
Start: 2025-01-15 | End: 2025-01-18 | Stop reason: HOSPADM

## 2025-01-15 RX ORDER — FAMOTIDINE 20 MG/1
40 TABLET, FILM COATED ORAL DAILY
Status: DISCONTINUED | OUTPATIENT
Start: 2025-01-15 | End: 2025-01-16

## 2025-01-15 RX ORDER — MAGNESIUM HYDROXIDE/ALUMINUM HYDROXICE/SIMETHICONE 120; 1200; 1200 MG/30ML; MG/30ML; MG/30ML
30 SUSPENSION ORAL EVERY 6 HOURS PRN
Status: DISCONTINUED | OUTPATIENT
Start: 2025-01-15 | End: 2025-01-18 | Stop reason: HOSPADM

## 2025-01-15 RX ADMIN — IPRATROPIUM BROMIDE AND ALBUTEROL SULFATE 1 DOSE: .5; 3 SOLUTION RESPIRATORY (INHALATION) at 12:10

## 2025-01-15 RX ADMIN — ENOXAPARIN SODIUM 30 MG: 100 INJECTION SUBCUTANEOUS at 21:21

## 2025-01-15 RX ADMIN — GUAIFENESIN 600 MG: 600 TABLET, MULTILAYER, EXTENDED RELEASE ORAL at 09:47

## 2025-01-15 RX ADMIN — ASPIRIN 81 MG: 81 TABLET, COATED ORAL at 09:47

## 2025-01-15 RX ADMIN — ARFORMOTEROL TARTRATE 15 MCG: 15 SOLUTION RESPIRATORY (INHALATION) at 20:43

## 2025-01-15 RX ADMIN — WATER 40 MG: 1 INJECTION INTRAMUSCULAR; INTRAVENOUS; SUBCUTANEOUS at 09:46

## 2025-01-15 RX ADMIN — IPRATROPIUM BROMIDE AND ALBUTEROL SULFATE 1 DOSE: .5; 3 SOLUTION RESPIRATORY (INHALATION) at 08:10

## 2025-01-15 RX ADMIN — INSULIN LISPRO 4 UNITS: 100 INJECTION, SOLUTION INTRAVENOUS; SUBCUTANEOUS at 18:16

## 2025-01-15 RX ADMIN — INSULIN GLARGINE 20 UNITS: 100 INJECTION, SOLUTION SUBCUTANEOUS at 21:19

## 2025-01-15 RX ADMIN — ACETAMINOPHEN 650 MG: 325 TABLET ORAL at 21:20

## 2025-01-15 RX ADMIN — GUAIFENESIN 600 MG: 600 TABLET, MULTILAYER, EXTENDED RELEASE ORAL at 21:20

## 2025-01-15 RX ADMIN — FUROSEMIDE 40 MG: 10 INJECTION, SOLUTION INTRAMUSCULAR; INTRAVENOUS at 18:15

## 2025-01-15 RX ADMIN — INSULIN LISPRO 10 UNITS: 100 INJECTION, SOLUTION INTRAVENOUS; SUBCUTANEOUS at 15:49

## 2025-01-15 RX ADMIN — FAMOTIDINE 40 MG: 20 TABLET, FILM COATED ORAL at 12:41

## 2025-01-15 RX ADMIN — WATER 40 MG: 1 INJECTION INTRAMUSCULAR; INTRAVENOUS; SUBCUTANEOUS at 21:20

## 2025-01-15 RX ADMIN — ACETAMINOPHEN 650 MG: 325 TABLET ORAL at 06:08

## 2025-01-15 RX ADMIN — CLOPIDOGREL BISULFATE 75 MG: 75 TABLET ORAL at 09:47

## 2025-01-15 RX ADMIN — SODIUM CHLORIDE, PRESERVATIVE FREE 10 ML: 5 INJECTION INTRAVENOUS at 09:48

## 2025-01-15 RX ADMIN — ENOXAPARIN SODIUM 30 MG: 100 INJECTION SUBCUTANEOUS at 09:46

## 2025-01-15 RX ADMIN — LISINOPRIL 10 MG: 10 TABLET ORAL at 09:47

## 2025-01-15 RX ADMIN — WATER 40 MG: 1 INJECTION INTRAMUSCULAR; INTRAVENOUS; SUBCUTANEOUS at 03:42

## 2025-01-15 RX ADMIN — IPRATROPIUM BROMIDE AND ALBUTEROL SULFATE 1 DOSE: .5; 3 SOLUTION RESPIRATORY (INHALATION) at 16:05

## 2025-01-15 RX ADMIN — SODIUM CHLORIDE, PRESERVATIVE FREE 10 ML: 5 INJECTION INTRAVENOUS at 21:05

## 2025-01-15 RX ADMIN — INSULIN LISPRO 4 UNITS: 100 INJECTION, SOLUTION INTRAVENOUS; SUBCUTANEOUS at 12:43

## 2025-01-15 RX ADMIN — WATER 40 MG: 1 INJECTION INTRAMUSCULAR; INTRAVENOUS; SUBCUTANEOUS at 15:50

## 2025-01-15 RX ADMIN — ACETAZOLAMIDE SODIUM 250 MG: 500 INJECTION, POWDER, LYOPHILIZED, FOR SOLUTION INTRAVENOUS at 18:15

## 2025-01-15 RX ADMIN — INSULIN LISPRO 5 UNITS: 100 INJECTION, SOLUTION INTRAVENOUS; SUBCUTANEOUS at 12:43

## 2025-01-15 RX ADMIN — FUROSEMIDE 40 MG: 10 INJECTION, SOLUTION INTRAMUSCULAR; INTRAVENOUS at 09:47

## 2025-01-15 RX ADMIN — IPRATROPIUM BROMIDE AND ALBUTEROL SULFATE 1 DOSE: .5; 3 SOLUTION RESPIRATORY (INHALATION) at 20:43

## 2025-01-15 RX ADMIN — AZITHROMYCIN MONOHYDRATE 500 MG: 500 INJECTION, POWDER, LYOPHILIZED, FOR SOLUTION INTRAVENOUS at 02:27

## 2025-01-15 RX ADMIN — BUDESONIDE 500 MCG: 0.5 INHALANT RESPIRATORY (INHALATION) at 20:43

## 2025-01-15 RX ADMIN — SODIUM CHLORIDE, PRESERVATIVE FREE 10 ML: 5 INJECTION INTRAVENOUS at 02:27

## 2025-01-15 RX ADMIN — ARFORMOTEROL TARTRATE 15 MCG: 15 SOLUTION RESPIRATORY (INHALATION) at 08:10

## 2025-01-15 RX ADMIN — ACETAZOLAMIDE SODIUM 250 MG: 500 INJECTION, POWDER, LYOPHILIZED, FOR SOLUTION INTRAVENOUS at 09:46

## 2025-01-15 RX ADMIN — INSULIN LISPRO 15 UNITS: 100 INJECTION, SOLUTION INTRAVENOUS; SUBCUTANEOUS at 18:15

## 2025-01-15 RX ADMIN — BUDESONIDE 500 MCG: 0.5 INHALANT RESPIRATORY (INHALATION) at 08:10

## 2025-01-15 RX ADMIN — INSULIN LISPRO 8 UNITS: 100 INJECTION, SOLUTION INTRAVENOUS; SUBCUTANEOUS at 21:19

## 2025-01-15 RX ADMIN — ALUMINUM HYDROXIDE, MAGNESIUM HYDROXIDE, AND SIMETHICONE 30 ML: 200; 200; 20 SUSPENSION ORAL at 21:42

## 2025-01-15 ASSESSMENT — PAIN DESCRIPTION - LOCATION
LOCATION: GENERALIZED
LOCATION: BACK
LOCATION: CHEST

## 2025-01-15 ASSESSMENT — PAIN DESCRIPTION - ORIENTATION
ORIENTATION: LOWER
ORIENTATION: LOWER;MID
ORIENTATION: UPPER;MID

## 2025-01-15 ASSESSMENT — PAIN DESCRIPTION - DESCRIPTORS
DESCRIPTORS: ACHING;DISCOMFORT
DESCRIPTORS: ACHING
DESCRIPTORS: THROBBING

## 2025-01-15 ASSESSMENT — PAIN SCALES - GENERAL
PAINLEVEL_OUTOF10: 5
PAINLEVEL_OUTOF10: 8
PAINLEVEL_OUTOF10: 4

## 2025-01-15 ASSESSMENT — PAIN DESCRIPTION - FREQUENCY: FREQUENCY: CONTINUOUS

## 2025-01-15 ASSESSMENT — PAIN DESCRIPTION - ONSET: ONSET: ON-GOING

## 2025-01-15 ASSESSMENT — PAIN DESCRIPTION - PAIN TYPE: TYPE: CHRONIC PAIN

## 2025-01-15 NOTE — PLAN OF CARE
Problem: Chronic Conditions and Co-morbidities  Goal: Patient's chronic conditions and co-morbidity symptoms are monitored and maintained or improved  Outcome: Progressing     Problem: Discharge Planning  Goal: Discharge to home or other facility with appropriate resources  Outcome: Progressing     Problem: Pain  Goal: Verbalizes/displays adequate comfort level or baseline comfort level  1/15/2025 1513 by Marga Andre RN  Outcome: Progressing     Problem: Hematologic - Adult  Goal: Maintains hematologic stability  Outcome: Progressing     Problem: Respiratory - Adult  Goal: Achieves optimal ventilation and oxygenation  Outcome: Progressing     Problem: Safety - Adult  Goal: Free from fall injury  Outcome: Progressing     Problem: ABCDS Injury Assessment  Goal: Absence of physical injury  Outcome: Progressing     Problem: Cardiovascular - Adult  Goal: Maintains optimal cardiac output and hemodynamic stability  Outcome: Progressing

## 2025-01-15 NOTE — PLAN OF CARE
Problem: Hematologic - Adult  Goal: Maintains hematologic stability  Outcome: Progressing     Problem: Pain  Goal: Verbalizes/displays adequate comfort level or baseline comfort level  Outcome: Progressing      01/14/25 1929   Vitals   Temp 97.5 °F (36.4 °C)   Temp Source Axillary   Pulse 73   Heart Rate Source Radial;Brachial   Respirations 17   /62   MAP (Calculated) 85   BP Location Right lower arm   BP Upper/Lower Lower   BP Method Automatic   Patient Position Sitting   Cardiac Rhythm Sinus rhythm   Oxygen Therapy   SpO2 100 %   Pulse Oximetry Type Continuous   Pulse Oximeter Device Location Finger   O2 Device PAP (positive airway pressure)  (18/8)   FiO2  50 %

## 2025-01-15 NOTE — PLAN OF CARE
Problem: Hematologic - Adult  Goal: Maintains hematologic stability  Outcome: Progressing      01/15/25 0332   Vitals   Temp 98.2 °F (36.8 °C)   Temp Source Axillary   Pulse 70   Heart Rate Source Brachial;Radial   Respirations 15   /63   MAP (Calculated) 80   BP Location Right lower arm   BP Upper/Lower Lower   BP Method Automatic   Patient Position Left side   Cardiac Rhythm Sinus rhythm   Oxygen Therapy   SpO2 100 %   Pulse Oximetry Type Continuous   Pulse Oximeter Device Location Finger   O2 Device PAP (positive airway pressure)  (18/8)   FiO2  40 %

## 2025-01-15 NOTE — PROGRESS NOTES
Hospitalist Progress Note      PCP: Cody Solano MD    Date of Admission: 1/13/2025    Chief Complaint: SOB    Hospital Course: 48 yo F with severe COPD, chronic respiratory failure with hypoxia on 3  L NC at all times, morbid obesity, tobacco abuse, DM 2, CAD who came to ER with SOB.  Admitted as inpatient for acute COPD exacerbation, acute on chronic respiratory failure with hypoxia and hypercapnia and acute diastolic CHF.  Started on Bipap, IV steroids, nebs and IV Lasix.  Followed by CCM.    Subjective:  Patient is awake on BiPap.  Less SOB.  Coughing.  No CP, HA or abdominal pain       Medications:  Reviewed    Infusion Medications    sodium chloride      sodium chloride      dextrose       Scheduled Medications    ipratropium 0.5 mg-albuterol 2.5 mg  1 Dose Inhalation Q4H WA RT    furosemide  40 mg IntraVENous BID    acetaZOLAMIDE  250 mg IntraVENous Once    sodium chloride flush  5-40 mL IntraVENous 2 times per day    sodium chloride flush  5-40 mL IntraVENous 2 times per day    enoxaparin  30 mg SubCUTAneous BID    insulin lispro  0-4 Units SubCUTAneous 4x Daily AC & HS    guaiFENesin  600 mg Oral BID    aspirin  81 mg Oral Daily    clopidogrel  75 mg Oral Daily    insulin glargine  20 Units SubCUTAneous Nightly    lisinopril  10 mg Oral Daily    azithromycin  500 mg IntraVENous Q24H    nicotine  1 patch TransDERmal Daily    arformoterol tartrate  15 mcg Nebulization BID RT    budesonide  0.5 mg Nebulization BID RT    methylPREDNISolone  40 mg IntraVENous Q6H    Followed by    [START ON 1/16/2025] predniSONE  40 mg Oral Daily     PRN Meds: ipratropium 0.5 mg-albuterol 2.5 mg, sodium chloride flush, sodium chloride, potassium chloride **OR** potassium alternative oral replacement **OR** potassium chloride, magnesium sulfate, acetaminophen **OR** acetaminophen, sodium chloride flush, sodium chloride, ondansetron **OR** ondansetron, polyethylene glycol, dextrose bolus **OR** dextrose bolus, glucagon  07:44 PM    WBCUA 6-10 01/20/2017 07:44 PM    BACTERIA 2+ 01/20/2017 07:44 PM    RBCUA  01/20/2017 07:44 PM    BLOODU see below 01/20/2017 07:44 PM    GLUCOSEU see below 01/20/2017 07:44 PM    GLUCOSEU NEGATIVE 09/26/2011 07:28 PM       Radiology:  CT CHEST PULMONARY EMBOLISM W CONTRAST   Final Result   1. No evidence of pulmonary embolism.   2. Mosaic attenuation within both lungs, which may be related to expiratory   phase of imaging versus air trapping or small vessel/airways disease.         XR CHEST PORTABLE   Final Result   Cardiomegaly. No focal airspace consolidation.             IP CONSULT TO PULMONOLOGY    Assessment/Plan:    Active Hospital Problems    Diagnosis     Acute on chronic respiratory failure with hypercapnia [J96.22]      Acute on chronic respiratory failure with hypoxia and hypercapnia  Change BiPap to NC per Pulm  Pulm consult appreciated  Resume diet  Home Bipap per Pulm  Continue IV Lasix today  Diamox added X 2 today  Acute COPD exacerbation  Continue Solumedrol  40 mg IV q6h  Continue Brovana and Pulmicort  nebs  Continue IV Zithromax  CAD  Continue ASA, Plavix and Lisinopril  DM 2  Continue Lantus 20 U qhs  Add Humalog 5 U with meals  SSI  Hypoglycemia orders  Morbid obesity  BMI is 50  Tobacco abuse disorder  Counseled for 11 minutes on smoking cessation during this admission  Nicotine patch    DVT Prophylaxis: Lovenox  Diet: ADULT DIET; Regular  Code Status: Full Code  PT/OT Eval Status: Requested    Dispo - Home    Discussed with patient, nursing and  CM.    Discussed with .    Looks better.  Continue IV Lasix today.  Home when ok with Pulm.    Partha Ander MD

## 2025-01-15 NOTE — PLAN OF CARE
Problem: Hematologic - Adult  Goal: Maintains hematologic stability  Outcome: Progressing     Problem: Pain  Goal: Verbalizes/displays adequate comfort level or baseline comfort level  Outcome: Progressing      01/14/25 2313   Vitals   Temp 97.7 °F (36.5 °C)   Temp Source Axillary   Pulse 62   Heart Rate Source Brachial;Radial   Respirations 14   /66   MAP (Calculated) 79   BP Location Right lower arm   BP Upper/Lower Lower   BP Method Automatic   Patient Position Left side   Cardiac Rhythm Sinus rhythm   Pain Assessment   Pain Assessment 0-10   Pain Level 7   Non-Pharmaceutical Pain Intervention(s) Declines   Oxygen Therapy   SpO2 100 %   Pulse Oximetry Type Continuous   Pulse Oximeter Device Location Finger   O2 Device PAP (positive airway pressure)  (18/8)   FiO2  50 %

## 2025-01-15 NOTE — FLOWSHEET NOTE
Messaged hospitalist Kris Garcia, DO via perfectserve at 5:32 AM, \"BIPAP continued since pt admission. No ABG/VBG ordered for today. RT inquired into gases for AM. Would you like ABG to re-evaluate?\"      See orders for VBG

## 2025-01-15 NOTE — PROGRESS NOTES
[facility] - Inpatient Rehabilitation Department   Phone: [phone]    Occupational Therapy    [x] Initial Evaluation            [] Daily Treatment Note         [] Discharge Summary      Patient: Casasndra Bowser   : 1975   MRN: [id number]   Date of Service:  1/15/2025    Admitting Diagnosis:  Acute on chronic respiratory failure with hypercapnia  Current Admission Summary: 49 y.o. female who presents with concerns of shortness of breath and cough, COPD exacerbation  Past Medical History:  has a past medical history of Allergic, Anemia, Asthma, CAD (coronary artery disease), Chronic back pain, Chronic kidney disease, COPD (chronic obstructive pulmonary disease) (HCC), Diabetes mellitus (HCC), GERD (gastroesophageal reflux disease), Hyperlipidemia, Hypertension, Narcolepsy, Pancreatitis, Pneumonia, Psychiatric problem, and Ulcer.  Past Surgical History:  has a past surgical history that includes Tubal ligation;  section; eye surgery; Cholecystectomy, laparoscopic (10/5/2011); Cardiac catheterization (2010, 2011); and Hysterectomy.    Discharge Recommendations: Cassandra Bowser scored a 20/24 on the AM-PAC ADL Inpatient form. Current research shows that an AM-PAC score of 18 or greater is typically associated with a discharge to the patient's home setting. Based on the patient's AM-PAC score, and their current ADL deficits, it is recommended that the patient have 2-3 sessions per week of Occupational Therapy at d/c to increase the patient's independence.  At this time, this patient demonstrates the endurance and safety to discharge home with HHOT (home vs OP services) and a follow up treatment frequency of 2-3x/wk.   Please see assessment section for further patient specific details.    If patient discharges prior to next session this note will serve as a discharge summary.  Please see below for the latest assessment towards goals.      DME Required For Discharge: DME to be determined

## 2025-01-15 NOTE — PROGRESS NOTES
[facility] - Inpatient Rehabilitation Department   Phone: [phone]    Physical Therapy    [x] Initial Evaluation            [] Daily Treatment Note         [] Discharge Summary      Patient: Cassandra Bowser   : 1975   MRN: [id number]   Date of Service:  1/15/2025  Admitting Diagnosis: Acute on chronic respiratory failure with hypercapnia  Current Admission Summary: aCssandra Bowser is a 49 y.o. female who presents to the emergency department today for evaluation for concerns of shortness of breath.  Patient reports that she has a history of COPD and is on 2 L of oxygen at baseline.  The patient reports that over the past few weeks she has had increasing shortness of breath as well as a cough.  She reports that she was seen at an outside facility and was given Zithromax.  She reports that she is also on high doses of steroids.  She reports that she continues to feel short of breath, continues to cough.  She reports that her cough is productive of sputum.  She is on 2 L of oxygen at baseline however the past couple days she has had increasing oxygen uses due to her shortness of breath.  Patient reports that she has a pain to the left side of her chest with radiation towards her left shoulder.  Seems to be worse with movement and coughing.  She does have a history of stents in place.  She denies any vomiting.  No diarrhea.  She reports that she saw Dr. Chew, her pulmonologist earlier today and recommend that she come to the emergency room to be admitted.   Past Medical History:  has a past medical history of Allergic, Anemia, Asthma, CAD (coronary artery disease), Chronic back pain, Chronic kidney disease, COPD (chronic obstructive pulmonary disease) (HCC), Diabetes mellitus (HCC), GERD (gastroesophageal reflux disease), Hyperlipidemia, Hypertension, Narcolepsy, Pancreatitis, Pneumonia, Psychiatric problem, and Ulcer.  Past Surgical History:  has a past surgical history that includes Tubal  exercise program, and safety education    Goals  Patient Goals: did not state   Short Term Goals:  Time Frame: discharge  Patient will complete bed mobility at modified independent   Patient will complete transfers at [facility]   Patient will ambulate 50 ft with use of LRAD at [facility]  Patient will ascend/descend 3 stairs with (L) ascending handrail at supervision    Above goals reviewed on 1/15/2025.  All goals are ongoing at this time unless indicated above.      Therapy Session Time      Individual Group Co-treatment   Time In     0854   Time Out     0928   Minutes     34     Timed Code Treatment Minutes:  19 Minutes  Total Treatment Minutes:  34       Electronically Signed By: Fozia Proctor, PT  Thanks, Fozia Proctor, PT, DPT [id number]

## 2025-01-15 NOTE — PROGRESS NOTES
01/15/25 0102   RT Protocol   History Pulmonary Disease 2   Respiratory pattern 2   Breath sounds 2   Cough 1   Indications for Bronchodilator Therapy On home bronchodilators   Bronchodilator Assessment Score 7

## 2025-01-15 NOTE — PROGRESS NOTES
Pulmonary and Critical Care Medicine    CC: SOB    Date: 1/15/2025  Admit Date:  2025  Reason for Consultation: acute on chronic hypercapnic / hypoxic respiratory failure  Consult Requesting Physician: Partha Andre MD     HPI     This is a 50 y/o F w/ a hx of COPD, chronic prednisone, tobacco use, chronic hypoxic respiratory failure 2L, probable KATTY, obesity, GERD, CAD who presented with SOB.     Overnight:  She is Am she is on NC  She did wear mask yesterday  Doing better    ROS: negative except as stated above    OBJECTIVE DATA       PHYSICAL EXAM:   Temp  Av.7 °F (36.5 °C)  Min: 96.7 °F (35.9 °C)  Max: 98.2 °F (36.8 °C)  Pulse  Av.5  Min: 61  Max: 89  BP  Min: 105/66  Max: 146/67  SpO2  Av %  Min: 93 %  Max: 100 %  FiO2   Av %  Min: 40 %  Max: 50 %    General: NAD  Neuro: awake and alert  Lung: scant wheezing, equal BS, non labored  Heart: regular rate    MEDICATIONS: Reviewed  Scheduled Meds:   ipratropium 0.5 mg-albuterol 2.5 mg  1 Dose Inhalation Q4H WA RT    furosemide  40 mg IntraVENous BID    sodium chloride flush  5-40 mL IntraVENous 2 times per day    sodium chloride flush  5-40 mL IntraVENous 2 times per day    enoxaparin  30 mg SubCUTAneous BID    insulin lispro  0-4 Units SubCUTAneous 4x Daily AC & HS    guaiFENesin  600 mg Oral BID    aspirin  81 mg Oral Daily    clopidogrel  75 mg Oral Daily    insulin glargine  20 Units SubCUTAneous Nightly    lisinopril  10 mg Oral Daily    azithromycin  500 mg IntraVENous Q24H    nicotine  1 patch TransDERmal Daily    arformoterol tartrate  15 mcg Nebulization BID RT    budesonide  0.5 mg Nebulization BID RT    methylPREDNISolone  40 mg IntraVENous Q6H    Followed by    [START ON 2025] predniSONE  40 mg Oral Daily     Continuous Infusions:   sodium chloride      sodium chloride      dextrose       PRN Meds:.ipratropium 0.5 mg-albuterol 2.5 mg, sodium chloride flush, sodium chloride, potassium chloride **OR** potassium alternative  oral replacement **OR** potassium chloride, magnesium sulfate, acetaminophen **OR** acetaminophen, sodium chloride flush, sodium chloride, ondansetron **OR** ondansetron, polyethylene glycol, dextrose bolus **OR** dextrose bolus, glucagon (rDNA), dextrose, bisacodyl     LABS: Reviewed.   Recent Labs     01/13/25  1917 01/14/25  0444 01/15/25  0456    144 143   K 5.0 4.8 4.2    101 96*   CO2 38* 35* 38*   BUN 24* 25* 22*   CREATININE 0.7 0.6 0.5*     Recent Labs     01/13/25  1917 01/14/25  0444 01/15/25  0456   WBC 14.2* 13.0* 9.3   HGB 9.0* 8.7* 8.4*   HCT 30.5* 28.6* 27.6*   MCV 84.6 84.2 83.2    149 142     Lab Results   Component Value Date/Time    PROTIME 12.4 03/18/2011 11:45 PM    INR 1.13 03/18/2011 11:45 PM     Lab Results   Component Value Date/Time    LRR6MQI 45.1 01/14/2025 12:37 PM    BEART 18.4 01/14/2025 12:37 PM    A9QGCQYA 97.6 01/14/2025 12:37 PM    PHART 7.421 01/14/2025 12:37 PM    ORV8GZB 69.4 01/14/2025 12:37 PM    PO2ART 80.6 01/14/2025 12:37 PM    ZTK7ZNH 105.8 01/14/2025 12:37 PM       Intake/Output Summary (Last 24 hours) at 1/15/2025 1018  Last data filed at 1/15/2025 0702  Gross per 24 hour   Intake 320.68 ml   Output 2850 ml   Net -2529.32 ml      In: 320.7 [I.V.:30]  Out: 2850      IMAGES: Reviewed       ASSESSMENT AND PLAN:     Acute on chronic hypoxic / hypercapnic respiratory failure  AE-COPD  Rhinovirus infection   Tobacco use  Probable KATTY + OHS  Obesity  Chronic prednisone use   Leukocytosis     Recommendations:  - clinically improved  - blood gas reviewed, will give diamox 250 BID x 2 today  - on NC today  - she should wear NIV at night 16/8  - she will benefit with home bipap  - continue brovana/pulmicort BID  - continue duoneb q 4 hrs  - continue solu-medrol as ordered  - continue azithromycin     This patient has Chronic Hypercapnic Respiratory Failure that is consequent to severe COPD. Recent ABG on 1/14/25 shows she remains hypercapnic with PaCo2 of

## 2025-01-15 NOTE — FLOWSHEET NOTE
01/14/25 7528   Pain Assessment   Non-Pharmaceutical Pain Intervention(s) Other (Comment)  (gave Tylenol now per pt request)

## 2025-01-15 NOTE — FLOWSHEET NOTE
01/14/25 2236   Precautions   Precautions Droplet  (respiratory panel PCR resulted +rhinovirus;droplet precautions added and charge RN updated)

## 2025-01-15 NOTE — CARE COORDINATION
Discharge Planning:    DAVID called [facility] to for BiPAP, [facility] reports he is working on that for the pt.    Electronically signed by João Osullivan on 1/15/2025 at 11:50 AM

## 2025-01-15 NOTE — PLAN OF CARE
Problem: Pain  Goal: Verbalizes/displays adequate comfort level or baseline comfort level  Outcome: Progressing      01/15/25 0608   Pain Assessment   Pain Location Generalized   Pain Descriptors Throbbing   Non-Pharmaceutical Pain Intervention(s) Emotional support  (and giving tylenol now per pt request)

## 2025-01-15 NOTE — PROGRESS NOTES
Hospitalist Progress Note      PCP: Cody Solano MD    Date of Admission: 1/13/2025    Chief Complaint: SOB    Hospital Course: 48 yo F with severe COPD, chronic respiratory failure with hypoxia on 3  L NC at all times, morbid obesity, tobacco abuse, DM 2, CAD who came to ER with SOB.  Admitted as inpatient for acute COPD exacerbation, acute on chronic respiratory failure with hypoxia and hypercapnia and acute diastolic CHF.  Started on Bipap, IV steroids, nebs and IV Lasix.  Followed by CCM.    Subjective:  Patient is slow arouse on BiPap.  Coughing.  Still SOB.  No CP, HA or abdominal pain       Medications:  Reviewed    Infusion Medications    sodium chloride      sodium chloride      dextrose       Scheduled Medications    sodium chloride flush  5-40 mL IntraVENous 2 times per day    sodium chloride flush  5-40 mL IntraVENous 2 times per day    enoxaparin  30 mg SubCUTAneous BID    insulin lispro  0-4 Units SubCUTAneous 4x Daily AC & HS    guaiFENesin  600 mg Oral BID    aspirin  81 mg Oral Daily    clopidogrel  75 mg Oral Daily    insulin glargine  20 Units SubCUTAneous Nightly    lisinopril  10 mg Oral Daily    azithromycin  500 mg IntraVENous Q24H    ipratropium 0.5 mg-albuterol 2.5 mg  1 Dose Inhalation Q4H RT    nicotine  1 patch TransDERmal Daily    arformoterol tartrate  15 mcg Nebulization BID RT    budesonide  0.5 mg Nebulization BID RT    methylPREDNISolone  40 mg IntraVENous Q6H    Followed by    [START ON 1/16/2025] predniSONE  40 mg Oral Daily     PRN Meds: sodium chloride flush, sodium chloride, potassium chloride **OR** potassium alternative oral replacement **OR** potassium chloride, magnesium sulfate, acetaminophen **OR** acetaminophen, sodium chloride flush, sodium chloride, ondansetron **OR** ondansetron, polyethylene glycol, dextrose bolus **OR** dextrose bolus, glucagon (rDNA), dextrose, bisacodyl      Intake/Output Summary (Last 24 hours) at 1/14/2025 1901  Last data filed at

## 2025-01-15 NOTE — ACP (ADVANCE CARE PLANNING)
Advanced Care Planning Note.    Purpose of Encounter: Advanced care planning in light of AECOPD  Parties In Attendance: Patient,   Decisional Capacity: Yes  Subjective: Patient is SOB  Objective: Cr 0.5  Goals of Care Determination: Patient wants full support (CPR, vent, surgery, HD, consider trach or PEG)  Plan:  BiPap, IV steroids, nebs, Abx, Pulm consult, PT/OT  Code Status: Full code   Time spent on Advanced care Plannin minutes  Advanced Care Planning Documents: Completed advanced directives on chart,  is the POA.    Partha Andre MD  1/15/2025 11:06 AM

## 2025-01-16 PROBLEM — R06.02 SHORTNESS OF BREATH: Status: ACTIVE | Noted: 2025-01-16

## 2025-01-16 PROBLEM — J96.02 ACUTE RESPIRATORY FAILURE WITH HYPERCAPNIA: Status: ACTIVE | Noted: 2025-01-13

## 2025-01-16 LAB
ANION GAP SERPL CALCULATED.3IONS-SCNC: 9 MMOL/L (ref 3–16)
BASOPHILS # BLD: 0 K/UL (ref 0–0.2)
BASOPHILS NFR BLD: 0.2 %
BUN SERPL-MCNC: 29 MG/DL (ref 7–20)
CALCIUM SERPL-MCNC: 9 MG/DL (ref 8.3–10.6)
CHLORIDE SERPL-SCNC: 100 MMOL/L (ref 99–110)
CO2 SERPL-SCNC: 31 MMOL/L (ref 21–32)
CREAT SERPL-MCNC: 0.7 MG/DL (ref 0.6–1.1)
DEPRECATED RDW RBC AUTO: 18.7 % (ref 12.4–15.4)
EOSINOPHIL # BLD: 0 K/UL (ref 0–0.6)
EOSINOPHIL NFR BLD: 0 %
GFR SERPLBLD CREATININE-BSD FMLA CKD-EPI: >90 ML/MIN/{1.73_M2}
GLUCOSE BLD-MCNC: 241 MG/DL (ref 70–99)
GLUCOSE BLD-MCNC: 276 MG/DL (ref 70–99)
GLUCOSE BLD-MCNC: 303 MG/DL (ref 70–99)
GLUCOSE BLD-MCNC: 311 MG/DL (ref 70–99)
GLUCOSE SERPL-MCNC: 331 MG/DL (ref 70–99)
HCT VFR BLD AUTO: 28.3 % (ref 36–48)
HGB BLD-MCNC: 8.6 G/DL (ref 12–16)
LYMPHOCYTES # BLD: 0.3 K/UL (ref 1–5.1)
LYMPHOCYTES NFR BLD: 3.6 %
MCH RBC QN AUTO: 25 PG (ref 26–34)
MCHC RBC AUTO-ENTMCNC: 30.4 G/DL (ref 31–36)
MCV RBC AUTO: 82.3 FL (ref 80–100)
MONOCYTES # BLD: 0.7 K/UL (ref 0–1.3)
MONOCYTES NFR BLD: 9 %
NEUTROPHILS # BLD: 6.8 K/UL (ref 1.7–7.7)
NEUTROPHILS NFR BLD: 87.2 %
PERFORMED ON: ABNORMAL
PLATELET # BLD AUTO: 135 K/UL (ref 135–450)
PMV BLD AUTO: 9.4 FL (ref 5–10.5)
POTASSIUM SERPL-SCNC: 3.6 MMOL/L (ref 3.5–5.1)
RBC # BLD AUTO: 3.44 M/UL (ref 4–5.2)
SODIUM SERPL-SCNC: 140 MMOL/L (ref 136–145)
WBC # BLD AUTO: 7.8 K/UL (ref 4–11)

## 2025-01-16 PROCEDURE — 2580000003 HC RX 258: Performed by: INTERNAL MEDICINE

## 2025-01-16 PROCEDURE — 85025 COMPLETE CBC W/AUTO DIFF WBC: CPT

## 2025-01-16 PROCEDURE — 99232 SBSQ HOSP IP/OBS MODERATE 35: CPT | Performed by: STUDENT IN AN ORGANIZED HEALTH CARE EDUCATION/TRAINING PROGRAM

## 2025-01-16 PROCEDURE — 6360000002 HC RX W HCPCS: Performed by: INTERNAL MEDICINE

## 2025-01-16 PROCEDURE — 80048 BASIC METABOLIC PNL TOTAL CA: CPT

## 2025-01-16 PROCEDURE — 99223 1ST HOSP IP/OBS HIGH 75: CPT | Performed by: INTERNAL MEDICINE

## 2025-01-16 PROCEDURE — 94761 N-INVAS EAR/PLS OXIMETRY MLT: CPT

## 2025-01-16 PROCEDURE — 2060000000 HC ICU INTERMEDIATE R&B

## 2025-01-16 PROCEDURE — 2500000003 HC RX 250 WO HCPCS: Performed by: NURSE PRACTITIONER

## 2025-01-16 PROCEDURE — 97530 THERAPEUTIC ACTIVITIES: CPT

## 2025-01-16 PROCEDURE — 6370000000 HC RX 637 (ALT 250 FOR IP): Performed by: NURSE PRACTITIONER

## 2025-01-16 PROCEDURE — 6370000000 HC RX 637 (ALT 250 FOR IP): Performed by: INTERNAL MEDICINE

## 2025-01-16 PROCEDURE — 6370000000 HC RX 637 (ALT 250 FOR IP): Performed by: STUDENT IN AN ORGANIZED HEALTH CARE EDUCATION/TRAINING PROGRAM

## 2025-01-16 PROCEDURE — 94640 AIRWAY INHALATION TREATMENT: CPT

## 2025-01-16 PROCEDURE — 94660 CPAP INITIATION&MGMT: CPT

## 2025-01-16 PROCEDURE — 6360000002 HC RX W HCPCS: Performed by: NURSE PRACTITIONER

## 2025-01-16 PROCEDURE — 6360000002 HC RX W HCPCS: Performed by: STUDENT IN AN ORGANIZED HEALTH CARE EDUCATION/TRAINING PROGRAM

## 2025-01-16 PROCEDURE — 2580000003 HC RX 258: Performed by: NURSE PRACTITIONER

## 2025-01-16 PROCEDURE — 36415 COLL VENOUS BLD VENIPUNCTURE: CPT

## 2025-01-16 PROCEDURE — 2700000000 HC OXYGEN THERAPY PER DAY

## 2025-01-16 RX ORDER — METOPROLOL SUCCINATE 25 MG/1
25 TABLET, EXTENDED RELEASE ORAL DAILY
Status: DISCONTINUED | OUTPATIENT
Start: 2025-01-16 | End: 2025-01-18 | Stop reason: HOSPADM

## 2025-01-16 RX ORDER — PANTOPRAZOLE SODIUM 40 MG/1
40 TABLET, DELAYED RELEASE ORAL
Status: DISCONTINUED | OUTPATIENT
Start: 2025-01-17 | End: 2025-01-18 | Stop reason: HOSPADM

## 2025-01-16 RX ORDER — BUPROPION HYDROCHLORIDE 150 MG/1
150 TABLET ORAL DAILY
Status: DISCONTINUED | OUTPATIENT
Start: 2025-01-16 | End: 2025-01-18 | Stop reason: HOSPADM

## 2025-01-16 RX ORDER — INSULIN GLARGINE 100 [IU]/ML
30 INJECTION, SOLUTION SUBCUTANEOUS 2 TIMES DAILY
Status: DISCONTINUED | OUTPATIENT
Start: 2025-01-16 | End: 2025-01-18 | Stop reason: HOSPADM

## 2025-01-16 RX ORDER — BUDESONIDE AND FORMOTEROL FUMARATE DIHYDRATE 160; 4.5 UG/1; UG/1
2 AEROSOL RESPIRATORY (INHALATION)
Status: DISCONTINUED | OUTPATIENT
Start: 2025-01-16 | End: 2025-01-18 | Stop reason: HOSPADM

## 2025-01-16 RX ORDER — FUROSEMIDE 10 MG/ML
40 INJECTION INTRAMUSCULAR; INTRAVENOUS 2 TIMES DAILY
Status: DISCONTINUED | OUTPATIENT
Start: 2025-01-16 | End: 2025-01-17

## 2025-01-16 RX ORDER — HYDROCODONE BITARTRATE AND ACETAMINOPHEN 10; 325 MG/1; MG/1
1 TABLET ORAL EVERY 4 HOURS PRN
Status: DISCONTINUED | OUTPATIENT
Start: 2025-01-16 | End: 2025-01-17

## 2025-01-16 RX ORDER — GABAPENTIN 300 MG/1
300 CAPSULE ORAL 3 TIMES DAILY
Status: DISCONTINUED | OUTPATIENT
Start: 2025-01-16 | End: 2025-01-18 | Stop reason: HOSPADM

## 2025-01-16 RX ORDER — PREDNISONE 20 MG/1
40 TABLET ORAL DAILY
Status: COMPLETED | OUTPATIENT
Start: 2025-01-17 | End: 2025-01-18

## 2025-01-16 RX ORDER — SPIRONOLACTONE 25 MG/1
25 TABLET ORAL DAILY
Status: DISCONTINUED | OUTPATIENT
Start: 2025-01-16 | End: 2025-01-18 | Stop reason: HOSPADM

## 2025-01-16 RX ADMIN — Medication 2 PUFF: at 20:08

## 2025-01-16 RX ADMIN — INSULIN GLARGINE 30 UNITS: 100 INJECTION, SOLUTION SUBCUTANEOUS at 20:55

## 2025-01-16 RX ADMIN — SPIRONOLACTONE 25 MG: 25 TABLET ORAL at 18:03

## 2025-01-16 RX ADMIN — INSULIN LISPRO 15 UNITS: 100 INJECTION, SOLUTION INTRAVENOUS; SUBCUTANEOUS at 08:51

## 2025-01-16 RX ADMIN — ALUMINUM HYDROXIDE, MAGNESIUM HYDROXIDE, AND SIMETHICONE 30 ML: 200; 200; 20 SUSPENSION ORAL at 13:21

## 2025-01-16 RX ADMIN — INSULIN LISPRO 6 UNITS: 100 INJECTION, SOLUTION INTRAVENOUS; SUBCUTANEOUS at 08:50

## 2025-01-16 RX ADMIN — AZITHROMYCIN MONOHYDRATE 500 MG: 500 INJECTION, POWDER, LYOPHILIZED, FOR SOLUTION INTRAVENOUS at 01:47

## 2025-01-16 RX ADMIN — SODIUM CHLORIDE, PRESERVATIVE FREE 10 ML: 5 INJECTION INTRAVENOUS at 08:52

## 2025-01-16 RX ADMIN — GUAIFENESIN 600 MG: 600 TABLET, MULTILAYER, EXTENDED RELEASE ORAL at 20:56

## 2025-01-16 RX ADMIN — FUROSEMIDE 40 MG: 10 INJECTION, SOLUTION INTRAMUSCULAR; INTRAVENOUS at 11:49

## 2025-01-16 RX ADMIN — SODIUM CHLORIDE, PRESERVATIVE FREE 10 ML: 5 INJECTION INTRAVENOUS at 08:51

## 2025-01-16 RX ADMIN — GABAPENTIN 300 MG: 300 CAPSULE ORAL at 20:56

## 2025-01-16 RX ADMIN — ENOXAPARIN SODIUM 30 MG: 100 INJECTION SUBCUTANEOUS at 08:50

## 2025-01-16 RX ADMIN — FAMOTIDINE 40 MG: 20 TABLET, FILM COATED ORAL at 08:50

## 2025-01-16 RX ADMIN — INSULIN LISPRO 15 UNITS: 100 INJECTION, SOLUTION INTRAVENOUS; SUBCUTANEOUS at 18:02

## 2025-01-16 RX ADMIN — IPRATROPIUM BROMIDE AND ALBUTEROL SULFATE 1 DOSE: .5; 3 SOLUTION RESPIRATORY (INHALATION) at 20:08

## 2025-01-16 RX ADMIN — BUDESONIDE 500 MCG: 0.5 INHALANT RESPIRATORY (INHALATION) at 07:56

## 2025-01-16 RX ADMIN — METOPROLOL SUCCINATE 25 MG: 25 TABLET, FILM COATED, EXTENDED RELEASE ORAL at 18:04

## 2025-01-16 RX ADMIN — BUPROPION HYDROCHLORIDE 150 MG: 150 TABLET, EXTENDED RELEASE ORAL at 11:48

## 2025-01-16 RX ADMIN — SODIUM CHLORIDE, PRESERVATIVE FREE 10 ML: 5 INJECTION INTRAVENOUS at 02:07

## 2025-01-16 RX ADMIN — INSULIN LISPRO 6 UNITS: 100 INJECTION, SOLUTION INTRAVENOUS; SUBCUTANEOUS at 13:21

## 2025-01-16 RX ADMIN — ARFORMOTEROL TARTRATE 15 MCG: 15 SOLUTION RESPIRATORY (INHALATION) at 07:57

## 2025-01-16 RX ADMIN — ENOXAPARIN SODIUM 30 MG: 100 INJECTION SUBCUTANEOUS at 20:55

## 2025-01-16 RX ADMIN — GABAPENTIN 300 MG: 300 CAPSULE ORAL at 11:48

## 2025-01-16 RX ADMIN — GUAIFENESIN 600 MG: 600 TABLET, MULTILAYER, EXTENDED RELEASE ORAL at 08:50

## 2025-01-16 RX ADMIN — FUROSEMIDE 40 MG: 10 INJECTION, SOLUTION INTRAMUSCULAR; INTRAVENOUS at 18:04

## 2025-01-16 RX ADMIN — CLOPIDOGREL BISULFATE 75 MG: 75 TABLET ORAL at 08:50

## 2025-01-16 RX ADMIN — LISINOPRIL 10 MG: 10 TABLET ORAL at 08:50

## 2025-01-16 RX ADMIN — PREDNISONE 40 MG: 20 TABLET ORAL at 08:50

## 2025-01-16 RX ADMIN — INSULIN LISPRO 2 UNITS: 100 INJECTION, SOLUTION INTRAVENOUS; SUBCUTANEOUS at 20:55

## 2025-01-16 RX ADMIN — ASPIRIN 81 MG: 81 TABLET, COATED ORAL at 08:50

## 2025-01-16 RX ADMIN — SODIUM CHLORIDE 125 MG: 9 INJECTION, SOLUTION INTRAVENOUS at 19:40

## 2025-01-16 RX ADMIN — INSULIN GLARGINE 30 UNITS: 100 INJECTION, SOLUTION SUBCUTANEOUS at 11:49

## 2025-01-16 RX ADMIN — INSULIN LISPRO 4 UNITS: 100 INJECTION, SOLUTION INTRAVENOUS; SUBCUTANEOUS at 18:03

## 2025-01-16 RX ADMIN — IPRATROPIUM BROMIDE AND ALBUTEROL SULFATE 1 DOSE: .5; 3 SOLUTION RESPIRATORY (INHALATION) at 13:02

## 2025-01-16 RX ADMIN — IPRATROPIUM BROMIDE AND ALBUTEROL SULFATE 1 DOSE: .5; 3 SOLUTION RESPIRATORY (INHALATION) at 07:56

## 2025-01-16 RX ADMIN — INSULIN LISPRO 15 UNITS: 100 INJECTION, SOLUTION INTRAVENOUS; SUBCUTANEOUS at 13:20

## 2025-01-16 RX ADMIN — ALUMINUM HYDROXIDE, MAGNESIUM HYDROXIDE, AND SIMETHICONE 30 ML: 200; 200; 20 SUSPENSION ORAL at 20:55

## 2025-01-16 RX ADMIN — SODIUM CHLORIDE, PRESERVATIVE FREE 10 ML: 5 INJECTION INTRAVENOUS at 20:56

## 2025-01-16 RX ADMIN — HYDROCODONE BITARTRATE AND ACETAMINOPHEN 1 TABLET: 10; 325 TABLET ORAL at 18:03

## 2025-01-16 RX ADMIN — HYDROCODONE BITARTRATE AND ACETAMINOPHEN 1 TABLET: 10; 325 TABLET ORAL at 11:48

## 2025-01-16 ASSESSMENT — PAIN DESCRIPTION - LOCATION
LOCATION: BACK
LOCATION: BACK

## 2025-01-16 ASSESSMENT — PAIN SCALES - GENERAL
PAINLEVEL_OUTOF10: 0
PAINLEVEL_OUTOF10: 8
PAINLEVEL_OUTOF10: 4
PAINLEVEL_OUTOF10: 8

## 2025-01-16 ASSESSMENT — PAIN DESCRIPTION - ORIENTATION
ORIENTATION: MID;LOWER
ORIENTATION: LOWER;MID

## 2025-01-16 ASSESSMENT — PAIN DESCRIPTION - DESCRIPTORS
DESCRIPTORS: ACHING;DISCOMFORT
DESCRIPTORS: ACHING;DISCOMFORT

## 2025-01-16 NOTE — FLOWSHEET NOTE
Lab called. Urine sample is down in lab, but no order present to run testing at this time. Sample will be good for \"3days,\" but RN will need to call lab to process testing if labs end up being ordered.

## 2025-01-16 NOTE — PLAN OF CARE
Problem: Chronic Conditions and Co-morbidities  Goal: Patient's chronic conditions and co-morbidity symptoms are monitored and maintained or improved  Outcome: Progressing     Problem: Discharge Planning  Goal: Discharge to home or other facility with appropriate resources  Outcome: Progressing     Problem: Pain  Goal: Verbalizes/displays adequate comfort level or baseline comfort level  1/16/2025 1825 by Marga Andre RN  Outcome: Progressing     Problem: Hematologic - Adult  Goal: Maintains hematologic stability  1/16/2025 1825 by Marga Andre RN  Outcome: Progressing     Problem: Respiratory - Adult  Goal: Achieves optimal ventilation and oxygenation  1/16/2025 1825 by Marga Andre RN  Outcome: Progressing     Problem: Safety - Adult  Goal: Free from fall injury  Outcome: Progressing     Problem: ABCDS Injury Assessment  Goal: Absence of physical injury  Outcome: Progressing     Problem: Cardiovascular - Adult  Goal: Maintains optimal cardiac output and hemodynamic stability  Outcome: Progressing

## 2025-01-16 NOTE — FLOWSHEET NOTE
Mesaged hospitalist Kris Garcia, DO via [place] at 5:48 AM, \" Pt admitted 1/13 with acute on chronic respiratory failure with hypercapnia. Urine from [facility] cloudy with past urination. Pt denies s/s UTI. Placed new hat in [facility] and obtained urine sample at this time with urination now. Pt is already on zithromax IV for COPD. Would you like to order U/A, C&S? '    Message read at 0548, and DO responded at 0549, \"Not if asymptomatic\"    No new orders obtained

## 2025-01-16 NOTE — PLAN OF CARE
Problem: Pain  Goal: Verbalizes/displays adequate comfort level or baseline comfort level  Outcome: Progressing      01/15/25 2120 01/15/25 2142   Pain Assessment   Pain Assessment  --  0-10   Pain Level  --  4   Pain Location  --  Chest   Pain Orientation  --  Upper;Mid   Pain Descriptors  --  Aching   Non-Pharmaceutical Pain Intervention(s) Emotional support  (and giving tylenol) Emotional support  (and giving maalox now)

## 2025-01-16 NOTE — PLAN OF CARE
Problem: Hematologic - Adult  Goal: Maintains hematologic stability  Outcome: Progressing      01/16/25 0537   Vitals   Temp 97.9 °F (36.6 °C)   Temp Source Axillary   Pulse 78   Heart Rate Source Brachial;Radial   Respirations 20   BP (!) 159/85   MAP (Calculated) 110   BP Location Right lower arm   BP Upper/Lower Lower   BP Method Automatic   Patient Position Sitting   Cardiac Rhythm Sinus rhythm   Oxygen Therapy   SpO2 100 %   O2 Device PAP (positive airway pressure)   FiO2  40 %      01/16/25 0537   Output (mL)   Urine 400 mL  (cloudy;will message hospitalist)   Urine Assessment   Urinary Status Voiding   Urine Color Yellow/straw   Urine Appearance Cloudy  (cloudy;will message hospitalist)

## 2025-01-16 NOTE — DISCHARGE INSTRUCTIONS
sites:    [email or web address]  --- this is American Heart Association interactive Healthier Living with Heart Failure guidebook. Please click hyperlink or copy / paste link into search bar. Use your mouse to scroll through the pages. Lots of information about weight monitoring, diet tips, activity, meds, etc    HF Hammond geovanna -- this is a free smart phone geovanna available for iPhone and Android download. Use your phone to track sodium / fluid intake, zone tool symptom tracking, weights, medications, etc. Click on this hyperlink HF Hammond Geovanna for QR code for easy download.    DASH (Dietary Approach to Stop Hypertension) diet -- https://www.nhlbi.nih.gov/education/dash-eating-plan -- this diet is a flexible eating plan that promotes heart healthy eating style. Click on hyperlink or copy / paste link into search bar. Lots of low sodium recipes and tips.    [email or web address]/recipes -- more free recipes      Your information:  Name: Cassandra Bowser  : 1975    Your Discharge Instructions    What to do after you leave the hospital:    Read, review and familiarize yourself with the information provided below and in a separate packet on  COPD: Exacerbation, Heart Failure Zones: General Info and Tobacco Use: Quitting    Diet: No Added Salt (3-4 grams per day), 1500 ml per day fluid restriction    Recommended activity:  Resume as tolerated  Avoid strenuous activity until instructed by your physician to resume; balance rest with periods of light to normal activity.     If you experience any of the following: Unusual or inadequately controlled pain; unusual transient shortness of breath; recurrent or persistent nausea, heartburn, palpitations or lightheadedness; increased swelling; increased fatigue; fever >100; please follow up with Cody Solano MD or go to the Emergency Room.      Home Health/ Outpatient Services: Referral has been made for Outpatient OT      Information

## 2025-01-16 NOTE — PROGRESS NOTES
clothes to change into so deferred ADL  Instrumental Activities of Daily Living  No IADL completed on this date.  Functional Mobility  Bed Mobility:  Scooting: modified independent  Comments: w/ bed rail  Transfers:  Sit to stand transfer:supervision  Stand to sit transfer: supervision  Comments:  Functional Mobility  Functional Mobility Activity: 30 ft+ 35 ft  Device Use: rolling walker  Required Assistance: stand by assistance  Comment: HR increased to ~127 bpm, SPO2> 87% (use of 3L of O2), SPO2 increased >90 seconds w/in 15 seconds of seated rest break  Balance:  Static Sitting Balance: good: independent with functional balance in unsupported position  Dynamic Sitting Balance: good: independent with functional balance in unsupported position  Static Standing Balance: fair (-): maintains balance at CGA with use of UE support  Dynamic Standing Balance: fair (-): maintains balance at CGA with use of UE support  Comments: tolerate standing 4x, ~1 min per each stand  Other Therapeutic Interventions  Standing therapeutic activities to improve endurance 1x 30 seconds while in stance w/ UE support on RW: alternating shoulder flexion and alternating stepping in place. Pt required 2 min rest break b/w task.   Functional Outcomes  AM-PAC Inpatient Daily Activity Raw Score: 18                                    Cognition  WFL  Orientation:    alert and oriented x 4  Command Following:   WFL     Education  Barriers To Learning: none  Patient Education: patient educated on goals, OT role and benefits, plan of care, proper use of assistive device/equipment, discharge recommendations  Learning Assessment:  patient verbalizes and demonstrates understanding    Assessment  Activity Tolerance: poor, HR elevated w/ use of O2  Impairments Requiring Therapeutic Intervention: decreased functional mobility, decreased ADL status, decreased endurance, increased pain  Prognosis: good  Clinical Assessment: Pt w/ fair progress towards therapy  goals however is significantly limited by global deconditioning. Pt required use of 3L of O2 and prolonged seated rest breaks. Pt completed t/f w/ SUP and functional mobility w/ use of RW and SBA. Pt able to tolerate short standing durations however is significantly limited by decreased endurance, poor activity tolerance, and global pain. Pt would continue to benefit from OT services to address endurance and energy conservation.   Safety Interventions: patient left in chair, chair alarm in place, call light within reach, gait belt, and nurse notified    Plan  Frequency: 3-5 x/per week  Current Treatment Recommendations: functional mobility training, endurance training, patient/caregiver education, and ADL/self-care training    Goals  Patient Goals: to return home    Short Term Goals:  Time Frame: by discharge   Patient will complete upper body ADL at modified independent   Patient will complete lower body ADL at modified independent   Patient will complete toileting at modified independent   Patient will complete grooming at modified independent, in stance at sink for 10-15 minutes, no rest breaks    Patient will complete functional transfers at modified independent   Patient will complete functional mobility at modified independent   Patient will verbalize/demonstrate 3/3 energy conservation techniques to incorporate in daily routine.    Above goals reviewed on 1/16/2025.  All goals are ongoing at this time unless indicated above.       Therapy Session Time     Individual Group Co-treatment   Time In 1009     Time Out 1044     Minutes 35          Timed Code Treatment Minutes:  Timed Code Treatment Minutes: 35 Minutes    Total Treatment Minutes:  35       Electronically Signed By: TRICE Gusman OTR/L, CNS ([id number]) 1/16/2025 10:58 AM

## 2025-01-16 NOTE — FLOWSHEET NOTE
01/15/25 2232   Treatment Team Notification   Reason for Communication Patient/Family request   Name of Team Member Notified RN called RT; pt ready for BIPAP to be placed   Treatment Team Role Respiratory Therapist   Method of Communication Call   Response En route   Notification Time 2232

## 2025-01-16 NOTE — PLAN OF CARE
Problem: Respiratory - Adult  Goal: Achieves optimal ventilation and oxygenation  Outcome: Progressing      01/15/25 1929   Oxygen Therapy   O2 Device Nasal cannula  (pt up to bathroom at this time)   O2 Flow Rate (L/min) 3 L/min

## 2025-01-16 NOTE — PLAN OF CARE
Problem: Hematologic - Adult  Goal: Maintains hematologic stability  Outcome: Progressing     Problem: Respiratory - Adult  Goal: Achieves optimal ventilation and oxygenation  Outcome: Progressing      01/16/25 0049   Vitals   Temp 98.8 °F (37.1 °C)   Temp Source Axillary   Pulse 76   Heart Rate Source Monitor   Respirations 18   BP (!) 120/56   MAP (Calculated) 77   BP Location Right lower arm   BP Upper/Lower Lower   BP Method Automatic   Patient Position Lying left side   Cardiac Rhythm Sinus rhythm   Oxygen Therapy   SpO2 94 %   Pulse Oximetry Type Intermittent   O2 Device PAP (positive airway pressure)  (16/8)   FiO2  40 %

## 2025-01-16 NOTE — PLAN OF CARE
Problem: Pain  Goal: Verbalizes/displays adequate comfort level or baseline comfort level  Outcome: Progressing     Problem: Hematologic - Adult  Goal: Maintains hematologic stability  Outcome: Progressing     Problem: Respiratory - Adult  Goal: Achieves optimal ventilation and oxygenation  Outcome: Progressing      01/15/25 2107   Vitals   Temp 98.7 °F (37.1 °C)   Temp Source Axillary   Pulse (!) 115   Heart Rate Source Radial   Respirations 20   BP (!) 150/54   MAP (Calculated) 86   BP Location Right lower arm   BP Upper/Lower Lower   BP Method Automatic   Patient Position Sitting   Cardiac Rhythm Sinus tachy   Pain Assessment   Pain Assessment 0-10   Pain Level 8   Pain Location Back;Hip;Leg;Chest   Pain Orientation Lower  (lower back at right hip worse pain)   Pain Type Chronic pain   Pain Frequency Continuous   Pain Onset On-going   Non-Pharmaceutical Pain Intervention(s) Emotional support   Oxygen Therapy   SpO2 93 %   Pulse Oximetry Type Intermittent   Pulse Oximeter Device Location Finger   O2 Device Nasal cannula   O2 Flow Rate (L/min) 3 L/min

## 2025-01-16 NOTE — CARE COORDINATION
Discharge Planning:    CM was informed pt needs some therapy- mental health by OT  CM provided pt will betterhelp information and informed the pt and  that OP PT/OT will be given to them with the DC paperwork and they can go to OP place offered by insurance. Pt and  agreed.  Pt will need RW    Electronically signed by João Osullivan on 1/16/2025 at 11:48 AM

## 2025-01-16 NOTE — ACP (ADVANCE CARE PLANNING)
Advanced Care Planning Note.    Purpose of Encounter: Advanced care planning in light of AECOPD  Parties In Attendance: Patient,   Decisional Capacity: Yes  Subjective: Patient has pain in legs  Objective: Cr 0.7  Goals of Care Determination: Patient wants full support (CPR, vent, surgery, HD, consider trach or PEG)  Plan:  BiPap at home, IV Lasix, Echo, new home O2 orders, IV steroids, nebs, Abx, Pulm consult, PT/OT (cannot offer at home due to aggressive dog)  Code Status: Full code   Time spent on Advanced care Plannin minutes  Advanced Care Planning Documents: Completed advanced directives on chart,  is the POA.    Partha Andre MD  2025 5:13 PM

## 2025-01-16 NOTE — PROGRESS NOTES
Pulmonary and Critical Care Medicine    CC: SOB    Date: 2025  Admit Date:  2025  Reason for Consultation: acute on chronic hypercapnic / hypoxic respiratory failure  Consult Requesting Physician: Partha Andre MD     HPI     This is a 48 y/o F w/ a hx of COPD, chronic prednisone, tobacco use, chronic hypoxic respiratory failure 2L, probable KATTY, obesity, GERD, CAD who presented with SOB.     Overnight:  She is significantly better  She is on 3LNC  NIV was set up for home     ROS: negative except as stated above    OBJECTIVE DATA       PHYSICAL EXAM:   Temp  Av.3 °F (36.8 °C)  Min: 97.9 °F (36.6 °C)  Max: 98.8 °F (37.1 °C)  Pulse  Av.3  Min: 74  Max: 115  BP  Min: 120/56  Max: 159/85  SpO2  Av.7 %  Min: 93 %  Max: 100 %  FiO2   Av %  Min: 40 %  Max: 40 %    General: NAD  Neuro: awake and alert  Lung: CTA  Heart: regular rate    MEDICATIONS: Reviewed  Scheduled Meds:   furosemide  40 mg IntraVENous BID    gabapentin  300 mg Oral TID    buPROPion  150 mg Oral Daily    insulin glargine  30 Units SubCUTAneous BID    [START ON 2025] pantoprazole  40 mg Oral QAM AC    budesonide-formoterol  2 puff Inhalation BID RT    tiotropium  2 puff Inhalation Daily RT    predniSONE  40 mg Oral Daily    ipratropium 0.5 mg-albuterol 2.5 mg  1 Dose Inhalation Q4H WA RT    insulin lispro  15 Units SubCUTAneous TID WC    insulin lispro  0-8 Units SubCUTAneous 4x Daily AC & HS    sodium chloride flush  5-40 mL IntraVENous 2 times per day    sodium chloride flush  5-40 mL IntraVENous 2 times per day    enoxaparin  30 mg SubCUTAneous BID    guaiFENesin  600 mg Oral BID    aspirin  81 mg Oral Daily    clopidogrel  75 mg Oral Daily    lisinopril  10 mg Oral Daily    nicotine  1 patch TransDERmal Daily     Continuous Infusions:   sodium chloride      sodium chloride      dextrose       PRN Meds:.HYDROcodone-acetaminophen, phenol, ipratropium 0.5 mg-albuterol 2.5 mg, aluminum & magnesium

## 2025-01-16 NOTE — CONSULTS
[facility]  Advanced CHF/Pulmonary Hypertension   Cardiac Evaluation      Cassandra Bowser  YOB: 1975    Requesting PHysician:  Dr. TAMMY Andre      Chief Complaint   Patient presents with    Cough    Shortness of Breath     Cough and sob, x 3 wks, states her doctor wanted to admit her today but there were no rooms, pt has multiple other complaints        History of Present Illness:  Cassandra Bowser is a 48 yo female with longstanding history of COPD, CAD, obesity who presented to the ED 1/13/25 with shortness of breath and lethargy.  She was sent to the ED from pulmonary for difficulty breathing.  For the last several months, she has had increasing shortness of breath with cough.  Cough has been productive of yellow sputum.  No fever.  She denies chest pain.  She smokes 3-4 packs per day.      In the ED, she was noted to have hypoxia on 3 liters.  She was admitted to treat COPD exacerbation with hypoxia and hypercapnia.  She was started on BIPAP, IV steroids, nebs, and IV lasix.  She has a history of CAD and used to follow with Dr. Wahl.  She has also followed with Dr. Cantu, but says that she was dismissed from the practice due to lack of follow up.  She has been following at [facility] cardiology, but wants to come back to [facility] cardiology.  Since admission, she has diuresed and is feeling better.  She has chronic edema in her legs.  She is very sedentary, sits all day with her legs dangling.  She is morbidly obese, and has evidence of edema in her abdominal folds.  We are asked to see her for CHF management.    Her echo is normal.  Normal LVEF.  She has anemia that is iron deficient.        Labs:  Sodium 144  K 5.0  BUN/Cre 24/0.7  Bnp 773 (no previous)  H/H 8.6/28.3   Platelet 135  WBC 7.8  Ferritin 18  Iron 19, 5%    CT chest:  IMPRESSION:  1. No evidence of pulmonary embolism.  2. Mosaic attenuation within both lungs, which may be related to expiratory  phase of imaging versus air trapping  or small vessel/airways disease.    CXR:  IMPRESSION:  Cardiomegaly. No focal airspace consolidation.    Echo:    Left Ventricle: Normal left ventricular systolic function with a visually estimated EF of 55 - 60%. Left ventricle size is normal. Normal wall thickness. Unable to assess wall motion.    Right Ventricle: Not well visualized.    Image quality is poor. Contrast used: Lumason. Technically difficult study due to patient's body habitus.    Meds prior to admission:  Aspirin 81 qd  Plavix 75 qd  Lasix 20 bid  Lisinopril 10 qd  Toprol xl 75 bid  Ranexa 500 bid  Crestor 10 qd    Allergies   Allergen Reactions    Sulfa Antibiotics Shortness Of Breath    Tramadol      Breathing difficulties    Ultracet [Tramadol-Acetaminophen]      Rash      Bactrim Rash    Cephalexin Rash    Ketorolac Tromethamine Rash    Levofloxacin Rash     Current Facility-Administered Medications   Medication Dose Route Frequency Provider Last Rate Last Admin    furosemide (LASIX) injection 40 mg  40 mg IntraVENous BID Partha Andre MD   40 mg at 01/16/25 1149    HYDROcodone-acetaminophen (NORCO)  MG per tablet 1 tablet  1 tablet Oral Q4H PRN Partha Andre MD   1 tablet at 01/16/25 1148    gabapentin (NEURONTIN) capsule 300 mg  300 mg Oral TID Partha Andre MD   300 mg at 01/16/25 1148    buPROPion (WELLBUTRIN XL) extended release tablet 150 mg  150 mg Oral Daily Partha Andre MD   150 mg at 01/16/25 1148    insulin glargine (LANTUS) injection vial 30 Units  30 Units SubCUTAneous BID Partha Andre MD   30 Units at 01/16/25 1149    [START ON 1/17/2025] pantoprazole (PROTONIX) tablet 40 mg  40 mg Oral QAM AC Partha Andre MD        phenol 1.4 % mouth spray 1 spray  1 spray Mouth/Throat Q2H PRN Partha Andre MD        budesonide-formoterol (SYMBICORT) 160-4.5 MCG/ACT inhaler 2 puff  2 puff Inhalation BID RT Yoni Kerr MD        tiotropium (SPIRIVA RESPIMAT) 2.5 MCG/ACT inhaler 2 puff  2 puff Inhalation Daily RT Yoni Kerr MD

## 2025-01-16 NOTE — FLOWSHEET NOTE
[address] 416 at 1944; d/w hospitalist Soniya Sanchez NP. Low dose sliding scale insulin changed to medium coverage. Pt to receive 8units humalog tonight and scheduled Lantus as already ordered. See orders and MAR. Insulin given at 2119

## 2025-01-16 NOTE — CARE COORDINATION
Case Management Assessment  Initial Evaluation    Date/Time of Evaluation: 1/16/2025 4:07 PM  Assessment Completed by: João Osullivan    If patient is discharged prior to next notation, then this note serves as note for discharge by case management.    Patient Name: Cassandra Bowser                   YOB: 1975  Diagnosis: Shortness of breath [R06.02]  Elevated troponin [R79.89]  COPD exacerbation (HCC) [J44.1]  Acute respiratory failure with hypercapnia [J96.02]  Acute on chronic respiratory failure with hypercapnia [J96.22]                   Date / Time: 1/13/2025  6:27 PM    Patient Admission Status: Inpatient   Readmission Risk (Low < 19, Mod (19-27), High > 27): Readmission Risk Score: 15.4    Current PCP: Cody Solano MD  PCP verified by CM? (P) Yes    Chart Reviewed: Yes      History Provided by: (P) Patient  Patient Orientation: (P) Alert and Oriented    Patient Cognition: (P) Alert    Hospitalization in the last 30 days (Readmission):  No    If yes, Readmission Assessment in  Navigator will be completed.    Advance Directives:      Code Status: Full Code   Patient's Primary Decision Maker is: (P) Legal Next of Kin (spouse Attila)      Discharge Planning:    Patient lives with: (P) Spouse/Significant Other, Children Type of Home: (P) House  Primary Care Giver: (P) Self  Patient Support Systems include: (P) Spouse/Significant Other, Children   Current Financial resources: (P) Medicaid  Current community resources: (P) None  Current services prior to admission: (P) None            Current DME:              Type of Home Care services:  (P) None    ADLS  Prior functional level: (P) Independent in ADLs/IADLs  Current functional level: (P) Assistance with the following:, Housework, Cooking    PT AM-PAC: 19 /24  OT AM-PAC: 18 /24    Family can provide assistance at DC: (P) Yes  Would you like Case Management to discuss the discharge plan with any other family members/significant others, and if  or concerns at this time.    The Plan for Transition of Care is related to the following treatment goals of Shortness of breath [R06.02]  Elevated troponin [R79.89]  COPD exacerbation (HCC) [J44.1]  Acute respiratory failure with hypercapnia [J96.02]  Acute on chronic respiratory failure with hypercapnia [J96.22]    IF APPLICABLE: The Patient and/or patient representative Cassandra and her family were provided with a choice of provider and agrees with the discharge plan. Freedom of choice list with basic dialogue that supports the patient's individualized plan of care/goals and shares the quality data associated with the providers was provided to:     Patient Representative Name:       The Patient and/or Patient Representative Agree with the Discharge Plan?      João Osullivan  Case Management Department  Ph: [phone] Fax: [phone]    Electronically signed by João Osullivan on 1/16/2025 at 4:07 PM     01/16/25 4906   Service Assessment   Patient Orientation Alert and Oriented   Cognition Alert   History Provided By Patient   Primary Caregiver Self   Accompanied By/Relationship self   Support Systems Spouse/Significant Other;Children   Patient's Healthcare Decision Maker is: Legal Next of Kin  (spouse Attila)   PCP Verified by CM Yes   Last Visit to PCP Within last 6 months   Prior Functional Level Independent in ADLs/IADLs   Current Functional Level Assistance with the following:;Housework;Cooking   Can patient return to prior living arrangement Yes   Ability to make needs known: Good   Family able to assist with home care needs: Yes   Would you like for me to discuss the discharge plan with any other family members/significant others, and if so, who? Yes  (spouse Attila)   Financial Resources Medicaid   Community Resources None   CM/SW Referral Other (see comment)  (DC planning and assistance)   Discharge Planning   Type of Residence House   Living Arrangements Spouse/Significant Other;Children   Current Services

## 2025-01-16 NOTE — FLOWSHEET NOTE
01/16/25 0150   Output (mL)   Urine 475 mL  (bsc)   Urine Assessment   Urinary Status Voiding   Urine Color Yellow/straw   Urine Appearance Cloudy  (Urine from BSC cloudy with this urination. Pt denies s/s UTI. Placed new hat in BSC for next urination to further assess & obtain specimen if needed)   Urine Odor No odor

## 2025-01-16 NOTE — CONSULTS
[facility] [place]  HEART FAILURE PROGRAM      Cassandra Bowser 1975    History:  Past Medical History:   Diagnosis Date    Allergic     Anemia     Asthma     CAD (coronary artery disease)     Stent LAD    Chronic back pain     back    Chronic kidney disease     kidney stones    COPD (chronic obstructive pulmonary disease) (HCC)     Diabetes mellitus (HCC)     gestional diabetes only    GERD (gastroesophageal reflux disease)     Hyperlipidemia     Hypertension     Narcolepsy     Pancreatitis 10/01/2011    Pneumonia 01/01/2008    Psychiatric problem     anxiewty, depression    Ulcer     stomach       ECHO:    1/14/25  Left Ventricle Normal left ventricular systolic function with a visually estimated EF of 55 - 60%. Left ventricle size is normal. Normal wall thickness. Unable to assess wall motion. Indeterminate diastolic function due to poor image quality.   Left Atrium Not well visualized.   Interatrial Septum Interatrial septum was not assessed.   Right Ventricle Not well visualized. Unable to assess systolic function.   Right Atrium Not well visualized.   Aortic Valve Not well visualized. Valve structure is normal. No regurgitation. No stenosis.   Mitral Valve Thickened leaflets. No regurgitation. No stenosis noted.   Tricuspid Valve Not well visualized. Valve structure is normal. No regurgitation. No stenosis noted. Unable to assess RVSP due to inadequate or insignificant tricuspid regurgitation.   Pulmonic Valve The pulmonic valve was not well visualized. No regurgitation. No stenosis noted.   Aorta Normal sized aortic root and ascending aorta.   IVC/Hepatic Veins IVC diameter is less than or equal to 21 mm and decreases greater than 50% during inspiration; therefore the estimated right atrial pressure is normal (~3 mmHg). IVC size is normal.   Pericardium No pericardial effusion.   Extracardiac No pleural effusion present.        ACE/ARB/ARNi: lisinopril 10 mg daily   BB:   Aldosterone Antagonist:

## 2025-01-16 NOTE — PROGRESS NOTES
Hospitalist Progress Note      PCP: Cody Solano MD    Date of Admission: 1/13/2025    Chief Complaint: SOB    Hospital Course: 50 yo F with severe COPD, chronic respiratory failure with hypoxia on 3  L NC at all times, morbid obesity, tobacco abuse, DM 2, CAD who came to ER with SOB.  Admitted as inpatient for acute COPD exacerbation, acute on chronic respiratory failure with hypoxia and hypercapnia and acute diastolic CHF.  Started on Bipap, IV steroids, nebs and IV Lasix.  Followed by CCM.    Subjective:  Patient is -5.2 L since admission.  Awake off BiPap, on NC.  Less SOB.  Still swollen in LE and less abdominal bloating.  Anxious.  Complaining of pain in legs from neuropathy.  No CP, HA or fevers.      Medications:  Reviewed    Infusion Medications    sodium chloride      sodium chloride      dextrose       Scheduled Medications    furosemide  40 mg IntraVENous BID    gabapentin  300 mg Oral TID    buPROPion  150 mg Oral Daily    insulin glargine  30 Units SubCUTAneous BID    [START ON 1/17/2025] pantoprazole  40 mg Oral QAM AC    budesonide-formoterol  2 puff Inhalation BID RT    tiotropium  2 puff Inhalation Daily RT    [START ON 1/17/2025] predniSONE  40 mg Oral Daily    ipratropium 0.5 mg-albuterol 2.5 mg  1 Dose Inhalation Q4H WA RT    insulin lispro  15 Units SubCUTAneous TID WC    insulin lispro  0-8 Units SubCUTAneous 4x Daily AC & HS    sodium chloride flush  5-40 mL IntraVENous 2 times per day    sodium chloride flush  5-40 mL IntraVENous 2 times per day    enoxaparin  30 mg SubCUTAneous BID    guaiFENesin  600 mg Oral BID    aspirin  81 mg Oral Daily    clopidogrel  75 mg Oral Daily    lisinopril  10 mg Oral Daily    nicotine  1 patch TransDERmal Daily     PRN Meds: HYDROcodone-acetaminophen, phenol, ipratropium 0.5 mg-albuterol 2.5 mg, aluminum & magnesium hydroxide-simethicone, sodium chloride flush, sodium chloride, magnesium sulfate, acetaminophen **OR** acetaminophen, sodium  01/13/25 1917 01/14/25  0444   TROPHS 32* 32*       Urinalysis:      Lab Results   Component Value Date/Time    NITRU see below 01/20/2017 07:44 PM    WBCUA 6-10 01/20/2017 07:44 PM    BACTERIA 2+ 01/20/2017 07:44 PM    RBCUA  01/20/2017 07:44 PM    BLOODU see below 01/20/2017 07:44 PM    GLUCOSEU see below 01/20/2017 07:44 PM    GLUCOSEU NEGATIVE 09/26/2011 07:28 PM       Radiology:  CT CHEST PULMONARY EMBOLISM W CONTRAST   Final Result   1. No evidence of pulmonary embolism.   2. Mosaic attenuation within both lungs, which may be related to expiratory   phase of imaging versus air trapping or small vessel/airways disease.         XR CHEST PORTABLE   Final Result   Cardiomegaly. No focal airspace consolidation.             IP CONSULT TO PULMONOLOGY  IP CONSULT TO HEART FAILURE NURSE/COORDINATOR  IP CONSULT TO CARDIOLOGY    Assessment/Plan:    Active Hospital Problems    Diagnosis     Acute on chronic respiratory failure with hypercapnia [J96.22]      Acute on chronic respiratory failure with hypoxia and hypercapnia  Continue NC per Pulm  Needs home O2 eval and new home O2 orders on day of DC as she wants to use [employer] from here now  Pulm consult appreciated  Resumed diet, FR 1.5 L daily  Home Bipap per Pulm (arranged to be available tomorrow)  Continue Lasix 40 mg IV bid (has a lot of fluid to diurese)  Acute COPD exacerbation  Wean Solumedrol  40 mg IV q6h to q8h  Discharge on Prednisone 40 mg daily (apparently been  on 80 mg daily as OP) and will address taper with Dr Chew (primary Pulm) in office  Continue Brovana and Pulmicort  nebs  Continue IV Zithromax  Acute on chronic diastolic CHF  Lasix 40 mg IV bid  CHF RN consult  Echo with normal EF  FR 1.5 L daily  CAD  Continue ASA, Plavix and Lisinopril  DM 2  Increase Lantus 20 U qhs to 30 U bid  Continue Humalog 15 U with meals  SSI  Hypoglycemia orders  Add Gabapentin for diabetic neuropathy  Resume Norco PRN  Morbid obesity  BMI is 50  Tobacco

## 2025-01-17 LAB
ANION GAP SERPL CALCULATED.3IONS-SCNC: 11 MMOL/L (ref 3–16)
BASOPHILS # BLD: 0 K/UL (ref 0–0.2)
BASOPHILS NFR BLD: 0.2 %
BUN SERPL-MCNC: 30 MG/DL (ref 7–20)
CALCIUM SERPL-MCNC: 9.3 MG/DL (ref 8.3–10.6)
CHLORIDE SERPL-SCNC: 102 MMOL/L (ref 99–110)
CO2 SERPL-SCNC: 30 MMOL/L (ref 21–32)
CREAT SERPL-MCNC: 0.7 MG/DL (ref 0.6–1.1)
DEPRECATED RDW RBC AUTO: 18.9 % (ref 12.4–15.4)
EOSINOPHIL # BLD: 0 K/UL (ref 0–0.6)
EOSINOPHIL NFR BLD: 0.1 %
GFR SERPLBLD CREATININE-BSD FMLA CKD-EPI: >90 ML/MIN/{1.73_M2}
GLUCOSE BLD-MCNC: 109 MG/DL (ref 70–99)
GLUCOSE BLD-MCNC: 145 MG/DL (ref 70–99)
GLUCOSE BLD-MCNC: 185 MG/DL (ref 70–99)
GLUCOSE BLD-MCNC: 251 MG/DL (ref 70–99)
GLUCOSE SERPL-MCNC: 100 MG/DL (ref 70–99)
HCT VFR BLD AUTO: 33.2 % (ref 36–48)
HGB BLD-MCNC: 9.8 G/DL (ref 12–16)
LYMPHOCYTES # BLD: 1.7 K/UL (ref 1–5.1)
LYMPHOCYTES NFR BLD: 14 %
MAGNESIUM SERPL-MCNC: 3.06 MG/DL (ref 1.8–2.4)
MCH RBC QN AUTO: 24.8 PG (ref 26–34)
MCHC RBC AUTO-ENTMCNC: 29.4 G/DL (ref 31–36)
MCV RBC AUTO: 84.2 FL (ref 80–100)
MONOCYTES # BLD: 1.1 K/UL (ref 0–1.3)
MONOCYTES NFR BLD: 9.4 %
NEUTROPHILS # BLD: 9.2 K/UL (ref 1.7–7.7)
NEUTROPHILS NFR BLD: 76.3 %
PERFORMED ON: ABNORMAL
PLATELET # BLD AUTO: 138 K/UL (ref 135–450)
PMV BLD AUTO: 9.2 FL (ref 5–10.5)
POTASSIUM SERPL-SCNC: 3.2 MMOL/L (ref 3.5–5.1)
RBC # BLD AUTO: 3.94 M/UL (ref 4–5.2)
SODIUM SERPL-SCNC: 143 MMOL/L (ref 136–145)
WBC # BLD AUTO: 12 K/UL (ref 4–11)

## 2025-01-17 PROCEDURE — 6360000002 HC RX W HCPCS: Performed by: NURSE PRACTITIONER

## 2025-01-17 PROCEDURE — 6370000000 HC RX 637 (ALT 250 FOR IP): Performed by: HOSPITALIST

## 2025-01-17 PROCEDURE — 97530 THERAPEUTIC ACTIVITIES: CPT

## 2025-01-17 PROCEDURE — 36415 COLL VENOUS BLD VENIPUNCTURE: CPT

## 2025-01-17 PROCEDURE — 6370000000 HC RX 637 (ALT 250 FOR IP): Performed by: NURSE PRACTITIONER

## 2025-01-17 PROCEDURE — 80048 BASIC METABOLIC PNL TOTAL CA: CPT

## 2025-01-17 PROCEDURE — 2500000003 HC RX 250 WO HCPCS: Performed by: NURSE PRACTITIONER

## 2025-01-17 PROCEDURE — 85025 COMPLETE CBC W/AUTO DIFF WBC: CPT

## 2025-01-17 PROCEDURE — 6370000000 HC RX 637 (ALT 250 FOR IP): Performed by: STUDENT IN AN ORGANIZED HEALTH CARE EDUCATION/TRAINING PROGRAM

## 2025-01-17 PROCEDURE — 97535 SELF CARE MNGMENT TRAINING: CPT

## 2025-01-17 PROCEDURE — 94761 N-INVAS EAR/PLS OXIMETRY MLT: CPT

## 2025-01-17 PROCEDURE — 6360000002 HC RX W HCPCS: Performed by: INTERNAL MEDICINE

## 2025-01-17 PROCEDURE — 94640 AIRWAY INHALATION TREATMENT: CPT

## 2025-01-17 PROCEDURE — 6370000000 HC RX 637 (ALT 250 FOR IP): Performed by: INTERNAL MEDICINE

## 2025-01-17 PROCEDURE — 2580000003 HC RX 258: Performed by: INTERNAL MEDICINE

## 2025-01-17 PROCEDURE — 83735 ASSAY OF MAGNESIUM: CPT

## 2025-01-17 PROCEDURE — 99232 SBSQ HOSP IP/OBS MODERATE 35: CPT | Performed by: NURSE PRACTITIONER

## 2025-01-17 PROCEDURE — 2060000000 HC ICU INTERMEDIATE R&B

## 2025-01-17 PROCEDURE — 2700000000 HC OXYGEN THERAPY PER DAY

## 2025-01-17 RX ORDER — POTASSIUM CHLORIDE 7.45 MG/ML
10 INJECTION INTRAVENOUS PRN
Status: DISCONTINUED | OUTPATIENT
Start: 2025-01-17 | End: 2025-01-18 | Stop reason: HOSPADM

## 2025-01-17 RX ORDER — HYDROCODONE BITARTRATE AND ACETAMINOPHEN 10; 325 MG/1; MG/1
1 TABLET ORAL EVERY 6 HOURS PRN
Status: DISCONTINUED | OUTPATIENT
Start: 2025-01-17 | End: 2025-01-18 | Stop reason: HOSPADM

## 2025-01-17 RX ORDER — TORSEMIDE 20 MG/1
60 TABLET ORAL DAILY
Status: DISCONTINUED | OUTPATIENT
Start: 2025-01-18 | End: 2025-01-18 | Stop reason: HOSPADM

## 2025-01-17 RX ORDER — BUDESONIDE AND FORMOTEROL FUMARATE DIHYDRATE 160; 4.5 UG/1; UG/1
2 AEROSOL RESPIRATORY (INHALATION)
Qty: 10.2 G | Refills: 3 | Status: SHIPPED | OUTPATIENT
Start: 2025-01-17

## 2025-01-17 RX ORDER — MECLIZINE HCL 12.5 MG 12.5 MG/1
25 TABLET ORAL 3 TIMES DAILY PRN
Status: CANCELLED | OUTPATIENT
Start: 2025-01-17

## 2025-01-17 RX ORDER — NICOTINE 21 MG/24HR
1 PATCH, TRANSDERMAL 24 HOURS TRANSDERMAL DAILY
Qty: 30 PATCH | Refills: 3 | Status: SHIPPED | OUTPATIENT
Start: 2025-01-18

## 2025-01-17 RX ORDER — POTASSIUM CHLORIDE 1500 MG/1
40 TABLET, EXTENDED RELEASE ORAL PRN
Status: DISCONTINUED | OUTPATIENT
Start: 2025-01-17 | End: 2025-01-18 | Stop reason: HOSPADM

## 2025-01-17 RX ORDER — SPIRONOLACTONE 25 MG/1
25 TABLET ORAL DAILY
Qty: 30 TABLET | Refills: 3 | Status: SHIPPED | OUTPATIENT
Start: 2025-01-18

## 2025-01-17 RX ORDER — MECLIZINE HCL 12.5 MG 12.5 MG/1
25 TABLET ORAL 3 TIMES DAILY
Status: DISCONTINUED | OUTPATIENT
Start: 2025-01-17 | End: 2025-01-18 | Stop reason: HOSPADM

## 2025-01-17 RX ORDER — SUCRALFATE 1 G/1
1 TABLET ORAL EVERY 12 HOURS SCHEDULED
Status: DISCONTINUED | OUTPATIENT
Start: 2025-01-17 | End: 2025-01-18 | Stop reason: HOSPADM

## 2025-01-17 RX ADMIN — SODIUM CHLORIDE 125 MG: 9 INJECTION, SOLUTION INTRAVENOUS at 11:45

## 2025-01-17 RX ADMIN — INSULIN LISPRO 15 UNITS: 100 INJECTION, SOLUTION INTRAVENOUS; SUBCUTANEOUS at 09:15

## 2025-01-17 RX ADMIN — GABAPENTIN 300 MG: 300 CAPSULE ORAL at 21:38

## 2025-01-17 RX ADMIN — INSULIN LISPRO 2 UNITS: 100 INJECTION, SOLUTION INTRAVENOUS; SUBCUTANEOUS at 17:15

## 2025-01-17 RX ADMIN — INSULIN GLARGINE 30 UNITS: 100 INJECTION, SOLUTION SUBCUTANEOUS at 09:15

## 2025-01-17 RX ADMIN — IPRATROPIUM BROMIDE AND ALBUTEROL SULFATE 1 DOSE: .5; 3 SOLUTION RESPIRATORY (INHALATION) at 11:39

## 2025-01-17 RX ADMIN — SODIUM CHLORIDE, PRESERVATIVE FREE 10 ML: 5 INJECTION INTRAVENOUS at 09:15

## 2025-01-17 RX ADMIN — ENOXAPARIN SODIUM 30 MG: 100 INJECTION SUBCUTANEOUS at 09:15

## 2025-01-17 RX ADMIN — SODIUM CHLORIDE, PRESERVATIVE FREE 10 ML: 5 INJECTION INTRAVENOUS at 21:39

## 2025-01-17 RX ADMIN — INSULIN GLARGINE 30 UNITS: 100 INJECTION, SOLUTION SUBCUTANEOUS at 21:39

## 2025-01-17 RX ADMIN — ALUMINUM HYDROXIDE, MAGNESIUM HYDROXIDE, AND SIMETHICONE 30 ML: 200; 200; 20 SUSPENSION ORAL at 21:38

## 2025-01-17 RX ADMIN — GUAIFENESIN 600 MG: 600 TABLET, MULTILAYER, EXTENDED RELEASE ORAL at 09:15

## 2025-01-17 RX ADMIN — Medication 2 PUFF: at 22:24

## 2025-01-17 RX ADMIN — PANTOPRAZOLE SODIUM 40 MG: 40 TABLET, DELAYED RELEASE ORAL at 05:08

## 2025-01-17 RX ADMIN — HYDROCODONE BITARTRATE AND ACETAMINOPHEN 1 TABLET: 10; 325 TABLET ORAL at 05:15

## 2025-01-17 RX ADMIN — LISINOPRIL 10 MG: 10 TABLET ORAL at 09:15

## 2025-01-17 RX ADMIN — ASPIRIN 81 MG: 81 TABLET, COATED ORAL at 09:15

## 2025-01-17 RX ADMIN — METOPROLOL SUCCINATE 25 MG: 25 TABLET, FILM COATED, EXTENDED RELEASE ORAL at 09:15

## 2025-01-17 RX ADMIN — FUROSEMIDE 40 MG: 10 INJECTION, SOLUTION INTRAMUSCULAR; INTRAVENOUS at 09:15

## 2025-01-17 RX ADMIN — INSULIN LISPRO 15 UNITS: 100 INJECTION, SOLUTION INTRAVENOUS; SUBCUTANEOUS at 17:15

## 2025-01-17 RX ADMIN — HYDROCODONE BITARTRATE AND ACETAMINOPHEN 1 TABLET: 10; 325 TABLET ORAL at 09:15

## 2025-01-17 RX ADMIN — MECLIZINE 25 MG: 12.5 TABLET ORAL at 21:38

## 2025-01-17 RX ADMIN — ALUMINUM HYDROXIDE, MAGNESIUM HYDROXIDE, AND SIMETHICONE 30 ML: 200; 200; 20 SUSPENSION ORAL at 05:15

## 2025-01-17 RX ADMIN — CLOPIDOGREL BISULFATE 75 MG: 75 TABLET ORAL at 09:15

## 2025-01-17 RX ADMIN — Medication 2 PUFF: at 07:54

## 2025-01-17 RX ADMIN — MECLIZINE 25 MG: 12.5 TABLET ORAL at 17:15

## 2025-01-17 RX ADMIN — SUCRALFATE 1 G: 1 TABLET ORAL at 11:45

## 2025-01-17 RX ADMIN — TIOTROPIUM BROMIDE INHALATION SPRAY 2 PUFF: 3.12 SPRAY, METERED RESPIRATORY (INHALATION) at 07:58

## 2025-01-17 RX ADMIN — ENOXAPARIN SODIUM 30 MG: 100 INJECTION SUBCUTANEOUS at 21:38

## 2025-01-17 RX ADMIN — POTASSIUM CHLORIDE 40 MEQ: 1500 TABLET, EXTENDED RELEASE ORAL at 09:25

## 2025-01-17 RX ADMIN — PREDNISONE 40 MG: 20 TABLET ORAL at 09:15

## 2025-01-17 RX ADMIN — IPRATROPIUM BROMIDE AND ALBUTEROL SULFATE 1 DOSE: .5; 3 SOLUTION RESPIRATORY (INHALATION) at 15:53

## 2025-01-17 RX ADMIN — HYDROCODONE BITARTRATE AND ACETAMINOPHEN 1 TABLET: 10; 325 TABLET ORAL at 21:38

## 2025-01-17 RX ADMIN — GABAPENTIN 300 MG: 300 CAPSULE ORAL at 13:35

## 2025-01-17 RX ADMIN — ALUMINUM HYDROXIDE, MAGNESIUM HYDROXIDE, AND SIMETHICONE 30 ML: 200; 200; 20 SUSPENSION ORAL at 13:35

## 2025-01-17 RX ADMIN — GABAPENTIN 300 MG: 300 CAPSULE ORAL at 09:15

## 2025-01-17 RX ADMIN — INSULIN LISPRO 4 UNITS: 100 INJECTION, SOLUTION INTRAVENOUS; SUBCUTANEOUS at 21:39

## 2025-01-17 RX ADMIN — IPRATROPIUM BROMIDE AND ALBUTEROL SULFATE 1 DOSE: .5; 3 SOLUTION RESPIRATORY (INHALATION) at 22:23

## 2025-01-17 RX ADMIN — HYDROCODONE BITARTRATE AND ACETAMINOPHEN 1 TABLET: 10; 325 TABLET ORAL at 13:35

## 2025-01-17 RX ADMIN — INSULIN LISPRO 15 UNITS: 100 INJECTION, SOLUTION INTRAVENOUS; SUBCUTANEOUS at 13:35

## 2025-01-17 RX ADMIN — SUCRALFATE 1 G: 1 TABLET ORAL at 21:38

## 2025-01-17 RX ADMIN — GUAIFENESIN 600 MG: 600 TABLET, MULTILAYER, EXTENDED RELEASE ORAL at 21:38

## 2025-01-17 RX ADMIN — SPIRONOLACTONE 25 MG: 25 TABLET ORAL at 09:15

## 2025-01-17 RX ADMIN — SODIUM CHLORIDE, PRESERVATIVE FREE 10 ML: 5 INJECTION INTRAVENOUS at 11:45

## 2025-01-17 RX ADMIN — IPRATROPIUM BROMIDE AND ALBUTEROL SULFATE 1 DOSE: .5; 3 SOLUTION RESPIRATORY (INHALATION) at 07:53

## 2025-01-17 ASSESSMENT — PAIN DESCRIPTION - LOCATION: LOCATION: BACK

## 2025-01-17 ASSESSMENT — PAIN SCALES - GENERAL
PAINLEVEL_OUTOF10: 8
PAINLEVEL_OUTOF10: 7
PAINLEVEL_OUTOF10: 9
PAINLEVEL_OUTOF10: 8

## 2025-01-17 ASSESSMENT — PAIN DESCRIPTION - ORIENTATION: ORIENTATION: MID

## 2025-01-17 ASSESSMENT — PAIN - FUNCTIONAL ASSESSMENT: PAIN_FUNCTIONAL_ASSESSMENT: ACTIVITIES ARE NOT PREVENTED

## 2025-01-17 ASSESSMENT — PAIN DESCRIPTION - DESCRIPTORS: DESCRIPTORS: DULL;SORE

## 2025-01-17 NOTE — PLAN OF CARE
Problem: Pain  Goal: Verbalizes/displays adequate comfort level or baseline comfort level  Outcome: Progressing  Note: Patient with c/o pain 8/10.  PRN Norco administered per orders - see MAR.  Will continue to monitor.     Problem: Safety - Adult  Goal: Free from fall injury  Outcome: Progressing  Note: Patient remains absent from falls at this time.  Remains alert and oriented, in bed with call light and belongings in reach.  Non-slip footwear on and 2/4 siderails raised.  Bed remains in lowest/locked position at all times with alarm activated.  Fall precautions in place.  Patient encouraged to use call light to request assistance, v/u.  Will continue to monitor.

## 2025-01-17 NOTE — PLAN OF CARE
Problem: Chronic Conditions and Co-morbidities  Goal: Patient's chronic conditions and co-morbidity symptoms are monitored and maintained or improved  1/16/2025 2325 by James Paredes RN  Outcome: Progressing  1/16/2025 1825 by Marga Andre RN  Outcome: Progressing     Problem: Discharge Planning  Goal: Discharge to home or other facility with appropriate resources  1/16/2025 2325 by James Paerdes RN  Outcome: Progressing  1/16/2025 1825 by Marga Andre RN  Outcome: Progressing     Problem: Pain  Goal: Verbalizes/displays adequate comfort level or baseline comfort level  1/16/2025 2325 by James Paredes RN  Outcome: Progressing  1/16/2025 1825 by Marga Andre RN  Outcome: Progressing     Problem: Hematologic - Adult  Goal: Maintains hematologic stability  1/16/2025 2325 by James Paredes RN  Outcome: Progressing  1/16/2025 1825 by Marga Andre RN  Outcome: Progressing     Problem: Respiratory - Adult  Goal: Achieves optimal ventilation and oxygenation  1/16/2025 2325 by James Paredes RN  Outcome: Progressing  1/16/2025 1825 by Marga Andre RN  Outcome: Progressing

## 2025-01-17 NOTE — DISCHARGE INSTR - COC
Continuity of Care Form    Patient Name: Cassandra Bowser   :  1975  MRN:  [id number]    Admit date:  2025  Discharge date:  2025    Code Status Order: Full Code   Advance Directives:   Advance Care Flowsheet Documentation             Admitting Physician:  Kj Mark MD  PCP: Cody Solano MD    Discharging Nurse: CRIS Lund RN  Discharging Hospital Unit/Room#: [id number]-[id number]/3368-01  Discharging Unit Phone Number: [phone]    Emergency Contact:   Extended Emergency Contact Information  Primary Emergency Contact: Attila Bowser  Address: [address] [place] [address] of [facility]  Home Phone: [phone]  Mobile Phone: [phone]  Relation: Spouse    Past Surgical History:  Past Surgical History:   Procedure Laterality Date    CARDIAC CATHETERIZATION  2010, 2011    Dr. Wahl     SECTION      CHOLECYSTECTOMY, LAPAROSCOPIC  10/5/2011    with intraoperative cholangiogram    EYE SURGERY      age 1    HYSTERECTOMY (CERVIX STATUS UNKNOWN)      TUBAL LIGATION         Immunization History:   Immunization History   Administered Date(s) Administered    Influenza Virus Vaccine 2006, 2010, 2011    Pneumococcal, PPSV23, PNEUMOVAX 23, (age 2y+), SC/IM, 0.5mL 2010    Rubella 2006       Active Problems:  Patient Active Problem List   Diagnosis Code    Chest pain R07.9    Smoker F17.200    Anxiety F41.9    Essential hypertension I10    COPD exacerbation (HCC) J44.1    Hypercholesteremia E78.00    Coronary artery disease due to lipid rich plaque I25.10, I25.83    Chronic kidney disease N18.9    Acute pancreatitis K85.90    Abnormal LFTs R79.89    Gallstones and inflammation of gallbladder with obstruction K80.19    Abscess of breast N61.1    Anemia D64.9    Pre-operative cardiovascular examination Z01.810    Iron deficiency anemia D50.9    Unstable angina (HCC) I20.0    Fibroids D21.9    Hoarseness R49.0    Menorrhagia N92.0    Morbid

## 2025-01-17 NOTE — PROGRESS NOTES
[facility] - Inpatient Rehabilitation Department   Phone: [phone]    Occupational Therapy    [] Initial Evaluation            [x] Daily Treatment Note         [] Discharge Summary      Patient: Cassandra Bowser   : 1975   MRN: [id number]   Date of Service:  2025    Admitting Diagnosis:  Acute respiratory failure with hypercapnia  Current Admission Summary: 49 y.o. female who presents with concerns of shortness of breath and cough, COPD exacerbation  Past Medical History:  has a past medical history of Allergic, Anemia, Asthma, CAD (coronary artery disease), Chronic back pain, Chronic kidney disease, COPD (chronic obstructive pulmonary disease) (HCC), Diabetes mellitus (HCC), GERD (gastroesophageal reflux disease), Hyperlipidemia, Hypertension, Narcolepsy, Pancreatitis, Pneumonia, Psychiatric problem, and Ulcer.  Past Surgical History:  has a past surgical history that includes Tubal ligation;  section; eye surgery; Cholecystectomy, laparoscopic (10/5/2011); Cardiac catheterization (2010, 2011); and Hysterectomy.    Discharge Recommendations: Cassandra Bowser scored a 19/24 on the AM-PAC ADL Inpatient form. Current research shows that an AM-PAC score of 18 or greater is typically associated with a discharge to the patient's home setting. Based on the patient's AM-PAC score, and their current ADL deficits, it is recommended that the patient have 2-3 sessions per week of Occupational Therapy at d/c to increase the patient's independence.  At this time, this patient demonstrates the endurance and safety to discharge home with OP OT(home vs OP services) and a follow up treatment frequency of 2-3x/wk.   Please see assessment section for further patient specific details.    If patient discharges prior to next session this note will serve as a discharge summary.  Please see below for the latest assessment towards goals.      Per conversation w/ pt, no home health therapies d/t  Mobility:  Supine to Sit: modified independent  Sit to Supine: modified independent  Comments: use of HR, HOB flat  Transfers:  Sit to stand transfer:stand by assistance  Stand to sit transfer: stand by assistance  Toilet transfer: stand by assistance  Toilet transfer equipment: standard toilet  Toilet transfer comments: use of R HR  Comments:  Functional Mobility  Functional Mobility Activity: to/from bathroom  Device Use: rolling walker  Required Assistance: stand by assistance  Comment: decreased endurance noted  Balance:  Static Sitting Balance: good: independent with functional balance in unsupported position  Dynamic Sitting Balance: fair (+): maintains balance at SBA/supervision without use of UE support  Static Standing Balance: fair (-): maintains balance at SBA with use of UE support  Dynamic Standing Balance: fair (-): maintains balance at SBA with use of UE support  Comments:    Other Therapeutic Interventions  Review HEP including shoulder flexion/extension, elbow flexion/extension, horizontal abduction/adduction, shoulder abduction/adduction, chest press. Pt verbalized and demo understanding.      Functional Outcomes  AM-PAC Inpatient Daily Activity Raw Score: 19            Cognition  WFL  Orientation:    alert and oriented x 4  Command Following:   WFL     Education  Barriers To Learning: none  Patient Education: patient educated on goals, OT role and benefits, plan of care, proper use of assistive device/equipment, discharge recommendations  Learning Assessment:  patient verbalizes and demonstrates understanding    Assessment  Activity Tolerance: fair, limited by decreased endurance  Impairments Requiring Therapeutic Intervention: decreased functional mobility, decreased ADL status, decreased endurance, increased pain  Prognosis: good  Clinical Assessment: Pt w/ fair progress towards therapy goals however is significantly limited by global deconditioning. Pt required use of 3L of O2 and prolonged

## 2025-01-17 NOTE — PROGRESS NOTES
Hospitalist Progress Note      PCP: Cody Solano MD    Date of Admission: 1/13/2025    Chief Complaint: SOB    Hospital Course: 50 yo F with severe COPD, chronic respiratory failure with hypoxia on 3  L NC at all times, morbid obesity, tobacco abuse, DM 2, CAD who came to ER with SOB.  Admitted as inpatient for acute COPD exacerbation, acute on chronic respiratory failure with hypoxia and hypercapnia and acute diastolic CHF.  Started on Bipap, IV steroids, nebs and IV Lasix.  Followed by CCM.    Subjective:  Patient is awak Less SOB.  Coughing.  No CP, HA or abdominal pain   Had some issues with hallucination with Wellbutrin yesterday.    Medications:  Reviewed    Infusion Medications    sodium chloride      sodium chloride      dextrose       Scheduled Medications    furosemide  40 mg IntraVENous BID    gabapentin  300 mg Oral TID    [Held by provider] buPROPion  150 mg Oral Daily    insulin glargine  30 Units SubCUTAneous BID    pantoprazole  40 mg Oral QAM AC    budesonide-formoterol  2 puff Inhalation BID RT    tiotropium  2 puff Inhalation Daily RT    predniSONE  40 mg Oral Daily    ferric gluconate  125 mg IntraVENous Daily    spironolactone  25 mg Oral Daily    metoprolol succinate  25 mg Oral Daily    ipratropium 0.5 mg-albuterol 2.5 mg  1 Dose Inhalation Q4H WA RT    insulin lispro  15 Units SubCUTAneous TID WC    insulin lispro  0-8 Units SubCUTAneous 4x Daily AC & HS    sodium chloride flush  5-40 mL IntraVENous 2 times per day    sodium chloride flush  5-40 mL IntraVENous 2 times per day    enoxaparin  30 mg SubCUTAneous BID    guaiFENesin  600 mg Oral BID    aspirin  81 mg Oral Daily    clopidogrel  75 mg Oral Daily    lisinopril  10 mg Oral Daily    nicotine  1 patch TransDERmal Daily     PRN Meds: potassium chloride **OR** potassium alternative oral replacement **OR** potassium chloride, HYDROcodone-acetaminophen, phenol, ipratropium 0.5 mg-albuterol 2.5 mg, aluminum & magnesium  hydroxide-simethicone, sodium chloride flush, sodium chloride, magnesium sulfate, acetaminophen **OR** acetaminophen, sodium chloride flush, sodium chloride, ondansetron **OR** ondansetron, polyethylene glycol, dextrose bolus **OR** dextrose bolus, glucagon (rDNA), dextrose, bisacodyl      Intake/Output Summary (Last 24 hours) at 1/17/2025 0824  Last data filed at 1/16/2025 1917  Gross per 24 hour   Intake --   Output 1600 ml   Net -1600 ml       Physical Exam Performed:    /64   Pulse 82   Temp 97.6 °F (36.4 °C) (Oral)   Resp 18   Ht 1.549 m (5' 1\")   Wt 111.4 kg (245 lb 11.2 oz)   LMP 10/15/2012   SpO2 96%   BMI 46.42 kg/m²     General appearance: On BiPap, awake, pleasant, morbidly obese  HEENT: Pupils equal, round, and reactive to light. Conjunctivae/corneas clear.  Neck: Supple, with full range of motion. No jugular venous distention. Trachea midline.  Respiratory:  Poor AE BL.    Cardiovascular: Regular rate and rhythm with normal S1/S2 without murmurs, rubs or gallops.  Abdomen: Soft, non-tender, obese, non-distended with normal bowel sounds.  Musculoskeletal: No clubbing, cyanosis or edema bilaterally.  Full range of motion without deformity.  Skin: Skin color, texture, turgor normal.  No rashes or lesions.  Neurologic:  Neurovascularly intact without any focal sensory/motor deficits. Cranial nerves: II-XII intact, grossly non-focal.  Psychiatric: Not agitated  Capillary Refill: Brisk, 3 seconds, normal   Peripheral Pulses: +2 palpable, equal bilaterally       Labs:   Recent Labs     01/15/25  0456 01/16/25  0502 01/17/25  0539   WBC 9.3 7.8 12.0*   HGB 8.4* 8.6* 9.8*   HCT 27.6* 28.3* 33.2*    135 138     Recent Labs     01/15/25  0456 01/16/25  0502 01/17/25  0539    140 143   K 4.2 3.6 3.2*   CL 96* 100 102   CO2 38* 31 30   BUN 22* 29* 30*   CREATININE 0.5* 0.7 0.7   CALCIUM 9.0 9.0 9.3     No results for input(s): \"AST\", \"ALT\", \"BILIDIR\", \"BILITOT\", \"ALKPHOS\" in the last 72

## 2025-01-17 NOTE — PROGRESS NOTES
Clubbing  Extremities: 1+ edema  Neurological/Psychiatric:  Oriented to time, place, and person  Non-anxious    Pertinent labs, diagnostic, device, and imaging results reviewed as a part of this visit    MEDICATIONS:   Scheduled Meds:   Scheduled Meds:   furosemide  40 mg IntraVENous BID    gabapentin  300 mg Oral TID    [Held by provider] buPROPion  150 mg Oral Daily    insulin glargine  30 Units SubCUTAneous BID    pantoprazole  40 mg Oral QAM AC    budesonide-formoterol  2 puff Inhalation BID RT    tiotropium  2 puff Inhalation Daily RT    predniSONE  40 mg Oral Daily    ferric gluconate  125 mg IntraVENous Daily    spironolactone  25 mg Oral Daily    metoprolol succinate  25 mg Oral Daily    ipratropium 0.5 mg-albuterol 2.5 mg  1 Dose Inhalation Q4H WA RT    insulin lispro  15 Units SubCUTAneous TID WC    insulin lispro  0-8 Units SubCUTAneous 4x Daily AC & HS    sodium chloride flush  5-40 mL IntraVENous 2 times per day    sodium chloride flush  5-40 mL IntraVENous 2 times per day    enoxaparin  30 mg SubCUTAneous BID    guaiFENesin  600 mg Oral BID    aspirin  81 mg Oral Daily    clopidogrel  75 mg Oral Daily    lisinopril  10 mg Oral Daily    nicotine  1 patch TransDERmal Daily     Continuous Infusions:   sodium chloride      sodium chloride      dextrose       PRN Meds:.potassium chloride **OR** potassium alternative oral replacement **OR** potassium chloride, HYDROcodone-acetaminophen, phenol, ipratropium 0.5 mg-albuterol 2.5 mg, aluminum & magnesium hydroxide-simethicone, sodium chloride flush, sodium chloride, magnesium sulfate, acetaminophen **OR** acetaminophen, sodium chloride flush, sodium chloride, ondansetron **OR** ondansetron, polyethylene glycol, dextrose bolus **OR** dextrose bolus, glucagon (rDNA), dextrose, bisacodyl  Continuous Infusions:   sodium chloride      sodium chloride      dextrose         Intake/Output Summary (Last 24 hours) at 1/17/2025 0921  Last data filed at 1/16/2025  QoL.      1. WEIGHT: Admit Weight - Scale: 110.7 kg (244 lb)      Today  Weight - Scale: 113.9 kg (251 lb 3.2 oz)   2. I/O   Intake/Output Summary (Last 24 hours) at 1/17/2025 0921  Last data filed at 1/16/2025 1917  Gross per 24 hour   Intake --   Output 1600 ml   Net -1600 ml         Assessment/Plan:     Acute Heart Failure:  ~Decompensated   ~  on admit, 7L out with IV lasix  ~Plan:Change IV lasix to po torsemide, janna started, replace iron, continue ACE, pt ok for d/c this afternoon  HTN:   ~controlled  ~Plan: continue current meds  CAD:   ~Status: stable  ~Meds:   Statin crestor - restart at d/c  BB: Metoprolol succinate  Plavix     All questions and concerns were addressed to the patient. Alternatives to my treatment were discussed. I have discussed the above stated plan with patient and the nurse. The patient verbalized understanding and agreed with the plan.    I appreciate the opportunity of cooperating in the care of this individual.    LUKE ROSENBAUM, APRN - CNP, ACNP, AGPCNP 1/17/2025, 9:21 AM  Heart Failure  [facility] [place] [address]  Ph: [phone]

## 2025-01-17 NOTE — CARE COORDINATION
Case Management -  Discharge Note      Patient Name: Cassandra Bowser                   YOB: 1975  Room: [facility]3368/3368-            Readmission Risk (Low < 19, Mod (19-27), High > 27): Readmission Risk Score: 14.2    Current PCP: Cody Solano MD      ([facility]) Important Message from Medicare:    Has pt received appropriate compliance notices before being discharged if required: N/A      PT AM-PAC: 19 /24  OT AM-PAC: 19 /24    Patient/patient representative has been educated on the benefits of home with OP PT/OT as well as the possible risks of declining recommended services. Patient/patient representative has acknowledged the information provided and decided on the following discharge plan. Patient/ patient representative has been provided freedom of choice regarding service provider, supported by basic dialogue that supports the patient's individualized plan of care/goals.    (Add Smart Phrase Here/Delete)      [facility] agency notified of discharge:  [x] Yes [] No  [] NA    Family notified of discharge:  [] Yes  [] No  [x] NA    Facility notified of discharge:  [] Yes  [] No  [x] NA     Financial    Payor: [facility] / Plan: [facility] MEDICAID / Product Type: *No Product type* /     Pharmacy:  Potential assistance Purchasing Medications: No  Meds-to-Beds request:        [id number] [place], [place] - [address] [phone] - F [phone]  [address] [address]  Phone: [phone] Fax: [phone]    [id number] - [place], [place] - [address] [phone] - F [phone]  [address] [address]  Phone: [phone] Fax: [phone]    [id number] - [place], [place] - [address] -  [phone] - F [phone]  [address] [address]  Phone: [phone] Fax: [phone]      Notes:    Additional Case Management Notes: Pt has DC order in. CM delivered the walker to pt's room. Pt's daughter is in the  room. Pt will be going to OP PT/OT. No other needs identified. [facility].    Electronically signed by João Osullivan on 1/17/2025 at 4:00 PM

## 2025-01-18 VITALS
SYSTOLIC BLOOD PRESSURE: 143 MMHG | OXYGEN SATURATION: 93 % | BODY MASS INDEX: 47.43 KG/M2 | HEART RATE: 88 BPM | WEIGHT: 251.2 LBS | RESPIRATION RATE: 20 BRPM | DIASTOLIC BLOOD PRESSURE: 77 MMHG | HEIGHT: 61 IN | TEMPERATURE: 98.4 F

## 2025-01-18 PROBLEM — R63.8 DIETARY INDISCRETION: Status: ACTIVE | Noted: 2025-01-18

## 2025-01-18 PROBLEM — I50.32 CHRONIC DIASTOLIC HEART FAILURE (HCC): Status: ACTIVE | Noted: 2025-01-18

## 2025-01-18 LAB
GLUCOSE BLD-MCNC: 101 MG/DL (ref 70–99)
PERFORMED ON: ABNORMAL

## 2025-01-18 PROCEDURE — 6370000000 HC RX 637 (ALT 250 FOR IP): Performed by: STUDENT IN AN ORGANIZED HEALTH CARE EDUCATION/TRAINING PROGRAM

## 2025-01-18 PROCEDURE — 6370000000 HC RX 637 (ALT 250 FOR IP): Performed by: NURSE PRACTITIONER

## 2025-01-18 PROCEDURE — 6370000000 HC RX 637 (ALT 250 FOR IP): Performed by: INTERNAL MEDICINE

## 2025-01-18 PROCEDURE — 94640 AIRWAY INHALATION TREATMENT: CPT

## 2025-01-18 PROCEDURE — 2580000003 HC RX 258: Performed by: INTERNAL MEDICINE

## 2025-01-18 PROCEDURE — 2500000003 HC RX 250 WO HCPCS: Performed by: NURSE PRACTITIONER

## 2025-01-18 PROCEDURE — 6360000002 HC RX W HCPCS: Performed by: INTERNAL MEDICINE

## 2025-01-18 PROCEDURE — 2700000000 HC OXYGEN THERAPY PER DAY

## 2025-01-18 PROCEDURE — 94761 N-INVAS EAR/PLS OXIMETRY MLT: CPT

## 2025-01-18 PROCEDURE — 6360000002 HC RX W HCPCS: Performed by: NURSE PRACTITIONER

## 2025-01-18 PROCEDURE — 6370000000 HC RX 637 (ALT 250 FOR IP): Performed by: HOSPITALIST

## 2025-01-18 PROCEDURE — 99232 SBSQ HOSP IP/OBS MODERATE 35: CPT | Performed by: CLINICAL NURSE SPECIALIST

## 2025-01-18 RX ORDER — ASPIRIN 81 MG/1
81 TABLET ORAL DAILY
Qty: 30 TABLET | Refills: 0 | Status: SHIPPED | OUTPATIENT
Start: 2025-01-18

## 2025-01-18 RX ORDER — ALBUTEROL SULFATE 90 UG/1
2 INHALANT RESPIRATORY (INHALATION) EVERY 4 HOURS PRN
Qty: 1 EACH | Refills: 0 | Status: SHIPPED | OUTPATIENT
Start: 2025-01-18

## 2025-01-18 RX ORDER — FLUTICASONE FUROATE, UMECLIDINIUM BROMIDE AND VILANTEROL TRIFENATATE 100; 62.5; 25 UG/1; UG/1; UG/1
1 POWDER RESPIRATORY (INHALATION) DAILY
Qty: 2 EACH | Refills: 0 | Status: SHIPPED | OUTPATIENT
Start: 2025-01-18

## 2025-01-18 RX ORDER — MECLIZINE HYDROCHLORIDE 25 MG/1
25 TABLET ORAL 3 TIMES DAILY
Qty: 30 TABLET | Refills: 0 | Status: SHIPPED | OUTPATIENT
Start: 2025-01-18 | End: 2025-01-28

## 2025-01-18 RX ORDER — CLOPIDOGREL BISULFATE 75 MG/1
75 TABLET ORAL DAILY
Qty: 30 TABLET | Refills: 0 | Status: ON HOLD | OUTPATIENT
Start: 2025-01-18 | End: 2025-01-22

## 2025-01-18 RX ORDER — METOPROLOL SUCCINATE 25 MG/1
25 TABLET, EXTENDED RELEASE ORAL DAILY
Qty: 30 TABLET | Refills: 3 | Status: SHIPPED | OUTPATIENT
Start: 2025-01-19

## 2025-01-18 RX ORDER — LISINOPRIL 10 MG/1
10 TABLET ORAL DAILY
Qty: 30 TABLET | Refills: 0 | Status: SHIPPED | OUTPATIENT
Start: 2025-01-18

## 2025-01-18 RX ADMIN — PANTOPRAZOLE SODIUM 40 MG: 40 TABLET, DELAYED RELEASE ORAL at 07:50

## 2025-01-18 RX ADMIN — INSULIN LISPRO 15 UNITS: 100 INJECTION, SOLUTION INTRAVENOUS; SUBCUTANEOUS at 09:25

## 2025-01-18 RX ADMIN — ASPIRIN 81 MG: 81 TABLET, COATED ORAL at 09:25

## 2025-01-18 RX ADMIN — GUAIFENESIN 600 MG: 600 TABLET, MULTILAYER, EXTENDED RELEASE ORAL at 09:25

## 2025-01-18 RX ADMIN — SUCRALFATE 1 G: 1 TABLET ORAL at 09:25

## 2025-01-18 RX ADMIN — IPRATROPIUM BROMIDE AND ALBUTEROL SULFATE 1 DOSE: .5; 3 SOLUTION RESPIRATORY (INHALATION) at 11:37

## 2025-01-18 RX ADMIN — SODIUM CHLORIDE 125 MG: 9 INJECTION, SOLUTION INTRAVENOUS at 09:35

## 2025-01-18 RX ADMIN — PREDNISONE 40 MG: 20 TABLET ORAL at 09:25

## 2025-01-18 RX ADMIN — ENOXAPARIN SODIUM 30 MG: 100 INJECTION SUBCUTANEOUS at 09:25

## 2025-01-18 RX ADMIN — CLOPIDOGREL BISULFATE 75 MG: 75 TABLET ORAL at 09:25

## 2025-01-18 RX ADMIN — SODIUM CHLORIDE, PRESERVATIVE FREE 10 ML: 5 INJECTION INTRAVENOUS at 09:25

## 2025-01-18 RX ADMIN — LISINOPRIL 10 MG: 10 TABLET ORAL at 09:25

## 2025-01-18 RX ADMIN — ALUMINUM HYDROXIDE, MAGNESIUM HYDROXIDE, AND SIMETHICONE 30 ML: 200; 200; 20 SUSPENSION ORAL at 11:35

## 2025-01-18 RX ADMIN — HYDROCODONE BITARTRATE AND ACETAMINOPHEN 1 TABLET: 10; 325 TABLET ORAL at 07:50

## 2025-01-18 RX ADMIN — GABAPENTIN 300 MG: 300 CAPSULE ORAL at 09:25

## 2025-01-18 RX ADMIN — Medication 2 PUFF: at 10:03

## 2025-01-18 RX ADMIN — MECLIZINE 25 MG: 12.5 TABLET ORAL at 09:25

## 2025-01-18 RX ADMIN — METOPROLOL SUCCINATE 25 MG: 25 TABLET, FILM COATED, EXTENDED RELEASE ORAL at 09:25

## 2025-01-18 RX ADMIN — INSULIN GLARGINE 30 UNITS: 100 INJECTION, SOLUTION SUBCUTANEOUS at 09:25

## 2025-01-18 RX ADMIN — TIOTROPIUM BROMIDE INHALATION SPRAY 2 PUFF: 3.12 SPRAY, METERED RESPIRATORY (INHALATION) at 10:03

## 2025-01-18 RX ADMIN — TORSEMIDE 60 MG: 20 TABLET ORAL at 09:25

## 2025-01-18 RX ADMIN — SPIRONOLACTONE 25 MG: 25 TABLET ORAL at 09:25

## 2025-01-18 ASSESSMENT — PAIN DESCRIPTION - LOCATION
LOCATION: BACK;FOOT
LOCATION: BACK;FOOT

## 2025-01-18 ASSESSMENT — PAIN SCALES - GENERAL
PAINLEVEL_OUTOF10: 10
PAINLEVEL_OUTOF10: 8

## 2025-01-18 NOTE — PROGRESS NOTES
Nutrition Education    Provided heart failure diet education.  Education included low sodium diet guidelines (3-4 gm Na+/day) and fluid restriction (64 oz/day).  Reviewed foods to choose and foods to avoid, along with label reading and ways to add flavor to food.  Pt does not currently follow a low sodium diet at home.  Pt states understanding of education.  Time spent with patient: 10 minutes.       Educated on CHF  Learners: Patient and Family  Readiness: Acceptance  Method: Explanation and Handout  Response: Verbalizes Understanding  Contact name and number provided.    Althea Gómez RD, LD  Contact Number: [phone]

## 2025-01-18 NOTE — PROGRESS NOTES
Data- discharge order received, pt verbalized agreement to discharge, disposition to previous residence, no needs for HHC/DME.     Action- discharge instructions prepared and given to patient, pt verbalized understanding. Medication information packet given r/t NEW and/or CHANGED prescriptions emphasizing name/purpose/side effects, pt verbalized understanding. Discharge instruction summary: Diet- No Added Salt, 1500 ml fluid restriction, Activity- Resume as tolerated, Primary Care Physician as follows: Cody Solano -[phone] f/u appointment 1-2 weeks, immunizations reviewed and up-to-date, prescription medications filled at pharmacy of choice. Inpatient surgical procedure precautions reviewed: N/A  CHF Education reviewed. Pt/ Family has had a total of 60 minutes CHF education this admission encounter.     1. WEIGHT: Admit Weight - Scale: 110.7 kg (244 lb) (01/13/25 1836)        Today  Weight - Scale: 113.9 kg (251 lb 3.2 oz) (01/17/25 0754)       2. O2 SAT.: SpO2: 93 % (01/18/25 1139)    Response- Pt belongings gathered, IV removed. Disposition is home (no HHC/DME needs), transported with belongings, taken to lobby via w/c w/ spouse, no complications.

## 2025-01-18 NOTE — PROGRESS NOTES
[facility]   Daily Progress Note      Admit Date:  1/13/2025    HPI:    Ms. Bowser is a 49 year old female with history of COPD, CAD, obesity, smoker    She presented to the hospital with shortness of breath, cough and lethargy.  She smokes 3-4 packs per day.  She was started on bipap, IV steroids, nebs and IV lasix.  She was dismissed by Dr Cantu due to lack of follow up.  She was following at [facility] but wants to come back to [facility].  Echo is normal       Subjective:  Patient is being seen for acute on chronic diastolic heart failure. There were no acute overnight cardiac events. She is on 2 L of oxygen and sitting on the side of the bed with her  at her side.  She feels better today and is wanting to continue to walk more today.  No chest pain, palpitations or increased shortness of breath.  Still with significant swelling in bilateral lower legs.   states they were spending $130 a week on her cigarettes   Weight 266-251 and -7.5 L out     Objective:   BP (!) 128/52   Pulse 76   Temp 98.2 °F (36.8 °C) (Oral)   Resp 20   Ht 1.549 m (5' 1\")   Wt 113.9 kg (251 lb 3.2 oz)   LMP 10/15/2012   SpO2 96%   BMI 47.46 kg/m²     Intake/Output Summary (Last 24 hours) at 1/18/2025 0755  Last data filed at 1/17/2025 2138  Gross per 24 hour   Intake 10 ml   Output 750 ml   Net -740 ml          Physical Exam:  General:  Awake, alert, oriented in NAD  Skin:  Warm and dry.  No unusual bruising or rash  Neck:  Supple.  No JVD or carotid bruit appreciated  Chest:  Normal effort.  Decreased bilaterally in bases  Cardiovascular:  RRR, S1/S2, no murmur/gallop/rub  Abdomen:  Soft, nontender, +bowel sounds  Extremities:  bilateral lower ankles to lower thigh edema  Neurological: No focal deficits  Psychological: Normal mood and affect      Medications:    sucralfate  1 g Oral 2 times per day    torsemide  60 mg Oral Daily    meclizine  25 mg Oral TID    gabapentin  300 mg Oral TID    [Held by

## 2025-01-18 NOTE — PLAN OF CARE
Problem: Pain  Goal: Verbalizes/displays adequate comfort level or baseline comfort level  Outcome: Progressing  Note: Patient continues with c/o generalized pain 8/10.  Scheduled medications administered per orders - see MAR.  Will continue to monitor.     Problem: Safety - Adult  Goal: Free from fall injury  Outcome: Progressing  Note: Patient remains absent from falls at this time.  Remains alert and oriented, in bed with call light and belongings in reach.  Non-slip footwear on and 2/4 siderails raised.  Bed remains in lowest/locked position at all times with alarm activated.  Fall precautions in place.  Patient encouraged to use call light to request assistance, v/u.  Will continue to monitor.

## 2025-01-20 ENCOUNTER — APPOINTMENT (OUTPATIENT)
Dept: GENERAL RADIOLOGY | Age: 50
DRG: 133 | End: 2025-01-20
Payer: COMMERCIAL

## 2025-01-20 ENCOUNTER — HOSPITAL ENCOUNTER (INPATIENT)
Age: 50
LOS: 3 days | Discharge: HOME | DRG: 133 | End: 2025-01-23
Attending: EMERGENCY MEDICINE | Admitting: INTERNAL MEDICINE
Payer: COMMERCIAL

## 2025-01-20 DIAGNOSIS — R79.89 ELEVATED TROPONIN: ICD-10-CM

## 2025-01-20 DIAGNOSIS — R06.00 DYSPNEA, UNSPECIFIED TYPE: Primary | ICD-10-CM

## 2025-01-20 PROBLEM — J96.21 ACUTE ON CHRONIC HYPOXIC RESPIRATORY FAILURE: Status: ACTIVE | Noted: 2025-01-20

## 2025-01-20 LAB
ALBUMIN SERPL-MCNC: 3.7 G/DL (ref 3.4–5)
ALBUMIN/GLOB SERPL: 1.6 {RATIO} (ref 1.1–2.2)
ALP SERPL-CCNC: 111 U/L (ref 40–129)
ALT SERPL-CCNC: 46 U/L (ref 10–40)
ANION GAP SERPL CALCULATED.3IONS-SCNC: 9 MMOL/L (ref 3–16)
AST SERPL-CCNC: 28 U/L (ref 15–37)
BASE EXCESS BLDA CALC-SCNC: 9.6 MMOL/L (ref -3–3)
BASE EXCESS BLDV CALC-SCNC: 9.6 MMOL/L (ref -3–3)
BASOPHILS # BLD: 0 K/UL (ref 0–0.2)
BASOPHILS NFR BLD: 0.2 %
BILIRUB SERPL-MCNC: 0.3 MG/DL (ref 0–1)
BUN SERPL-MCNC: 38 MG/DL (ref 7–20)
CALCIUM SERPL-MCNC: 8.7 MG/DL (ref 8.3–10.6)
CHLORIDE SERPL-SCNC: 101 MMOL/L (ref 99–110)
CO2 BLDA-SCNC: 83.1 MMOL/L
CO2 BLDV-SCNC: 84 MMOL/L
CO2 SERPL-SCNC: 33 MMOL/L (ref 21–32)
COHGB MFR BLDA: 2.8 % (ref 0–1.5)
COHGB MFR BLDV: 3.9 % (ref 0–1.5)
CREAT SERPL-MCNC: 1 MG/DL (ref 0.6–1.1)
DEPRECATED RDW RBC AUTO: 19.6 % (ref 12.4–15.4)
DO-HGB MFR BLDV: 4 %
EKG ATRIAL RATE: 87 BPM
EKG DIAGNOSIS: NORMAL
EKG P AXIS: 63 DEGREES
EKG P-R INTERVAL: 124 MS
EKG Q-T INTERVAL: 368 MS
EKG QRS DURATION: 68 MS
EKG QTC CALCULATION (BAZETT): 442 MS
EKG R AXIS: 64 DEGREES
EKG T AXIS: 143 DEGREES
EKG VENTRICULAR RATE: 87 BPM
EOSINOPHIL # BLD: 0.1 K/UL (ref 0–0.6)
EOSINOPHIL NFR BLD: 0.8 %
FLUAV RNA RESP QL NAA+PROBE: NOT DETECTED
FLUBV RNA RESP QL NAA+PROBE: NOT DETECTED
GFR SERPLBLD CREATININE-BSD FMLA CKD-EPI: 68 ML/MIN/{1.73_M2}
GLUCOSE BLD-MCNC: 117 MG/DL (ref 70–99)
GLUCOSE BLD-MCNC: 134 MG/DL (ref 70–99)
GLUCOSE BLD-MCNC: 90 MG/DL (ref 70–99)
GLUCOSE SERPL-MCNC: 125 MG/DL (ref 70–99)
HCO3 BLDA-SCNC: 35.4 MMOL/L (ref 21–29)
HCO3 BLDV-SCNC: 35.9 MMOL/L (ref 23–29)
HCT VFR BLD AUTO: 29.1 % (ref 36–48)
HGB BLD-MCNC: 8.7 G/DL (ref 12–16)
HGB BLDA-MCNC: 8 G/DL (ref 12–16)
LACTATE BLDV-SCNC: 0.8 MMOL/L (ref 0.4–2)
LYMPHOCYTES # BLD: 0.8 K/UL (ref 1–5.1)
LYMPHOCYTES NFR BLD: 4.2 %
MCH RBC QN AUTO: 25 PG (ref 26–34)
MCHC RBC AUTO-ENTMCNC: 29.8 G/DL (ref 31–36)
MCV RBC AUTO: 83.8 FL (ref 80–100)
METHGB MFR BLDA: 1.2 %
METHGB MFR BLDV: 0.8 %
MONOCYTES # BLD: 0.9 K/UL (ref 0–1.3)
MONOCYTES NFR BLD: 4.9 %
NEUTROPHILS # BLD: 15.9 K/UL (ref 1.7–7.7)
NEUTROPHILS NFR BLD: 89.9 %
NT-PROBNP SERPL-MCNC: 184 PG/ML (ref 0–124)
O2 CT VFR BLDV CALC: 11 VOL %
O2 THERAPY: ABNORMAL
O2 THERAPY: ABNORMAL
PCO2 BLDA: 55.9 MMHG (ref 35–45)
PCO2 BLDV: 58.9 MMHG (ref 40–50)
PERFORMED ON: ABNORMAL
PERFORMED ON: ABNORMAL
PERFORMED ON: NORMAL
PH BLDA: 7.41 [PH] (ref 7.35–7.45)
PH BLDV: 7.39 [PH] (ref 7.35–7.45)
PLATELET # BLD AUTO: 140 K/UL (ref 135–450)
PMV BLD AUTO: 9 FL (ref 5–10.5)
PO2 BLDA: 75.2 MMHG (ref 75–108)
PO2 BLDV: 79.6 MMHG (ref 25–40)
POTASSIUM SERPL-SCNC: 4.9 MMOL/L (ref 3.5–5.1)
PROT SERPL-MCNC: 6 G/DL (ref 6.4–8.2)
RBC # BLD AUTO: 3.47 M/UL (ref 4–5.2)
SAO2 % BLDA: 96.1 %
SAO2 % BLDV: 95 %
SARS-COV-2 RNA RESP QL NAA+PROBE: NOT DETECTED
SODIUM SERPL-SCNC: 143 MMOL/L (ref 136–145)
TROPONIN, HIGH SENSITIVITY: 65 NG/L (ref 0–14)
TROPONIN, HIGH SENSITIVITY: 74 NG/L (ref 0–14)
WBC # BLD AUTO: 17.7 K/UL (ref 4–11)

## 2025-01-20 PROCEDURE — 83605 ASSAY OF LACTIC ACID: CPT

## 2025-01-20 PROCEDURE — 87040 BLOOD CULTURE FOR BACTERIA: CPT

## 2025-01-20 PROCEDURE — 99254 IP/OBS CNSLTJ NEW/EST MOD 60: CPT | Performed by: INTERNAL MEDICINE

## 2025-01-20 PROCEDURE — 6370000000 HC RX 637 (ALT 250 FOR IP): Performed by: INTERNAL MEDICINE

## 2025-01-20 PROCEDURE — 87636 SARSCOV2 & INF A&B AMP PRB: CPT

## 2025-01-20 PROCEDURE — 2580000003 HC RX 258: Performed by: EMERGENCY MEDICINE

## 2025-01-20 PROCEDURE — 2500000003 HC RX 250 WO HCPCS: Performed by: INTERNAL MEDICINE

## 2025-01-20 PROCEDURE — 2060000000 HC ICU INTERMEDIATE R&B

## 2025-01-20 PROCEDURE — 6370000000 HC RX 637 (ALT 250 FOR IP): Performed by: NURSE PRACTITIONER

## 2025-01-20 PROCEDURE — 94761 N-INVAS EAR/PLS OXIMETRY MLT: CPT

## 2025-01-20 PROCEDURE — 94660 CPAP INITIATION&MGMT: CPT

## 2025-01-20 PROCEDURE — 80053 COMPREHEN METABOLIC PANEL: CPT

## 2025-01-20 PROCEDURE — 83880 ASSAY OF NATRIURETIC PEPTIDE: CPT

## 2025-01-20 PROCEDURE — 36600 WITHDRAWAL OF ARTERIAL BLOOD: CPT

## 2025-01-20 PROCEDURE — 94640 AIRWAY INHALATION TREATMENT: CPT

## 2025-01-20 PROCEDURE — 85025 COMPLETE CBC W/AUTO DIFF WBC: CPT

## 2025-01-20 PROCEDURE — 5A09357 ASSISTANCE WITH RESPIRATORY VENTILATION, LESS THAN 24 CONSECUTIVE HOURS, CONTINUOUS POSITIVE AIRWAY PRESSURE: ICD-10-PCS | Performed by: INTERNAL MEDICINE

## 2025-01-20 PROCEDURE — 6360000002 HC RX W HCPCS: Performed by: EMERGENCY MEDICINE

## 2025-01-20 PROCEDURE — 99285 EMERGENCY DEPT VISIT HI MDM: CPT

## 2025-01-20 PROCEDURE — 6370000000 HC RX 637 (ALT 250 FOR IP): Performed by: EMERGENCY MEDICINE

## 2025-01-20 PROCEDURE — 93010 ELECTROCARDIOGRAM REPORT: CPT | Performed by: INTERNAL MEDICINE

## 2025-01-20 PROCEDURE — 2580000003 HC RX 258: Performed by: INTERNAL MEDICINE

## 2025-01-20 PROCEDURE — 93005 ELECTROCARDIOGRAM TRACING: CPT | Performed by: EMERGENCY MEDICINE

## 2025-01-20 PROCEDURE — 6360000002 HC RX W HCPCS: Performed by: INTERNAL MEDICINE

## 2025-01-20 PROCEDURE — 84484 ASSAY OF TROPONIN QUANT: CPT

## 2025-01-20 PROCEDURE — 82803 BLOOD GASES ANY COMBINATION: CPT

## 2025-01-20 PROCEDURE — 71045 X-RAY EXAM CHEST 1 VIEW: CPT

## 2025-01-20 PROCEDURE — 94669 MECHANICAL CHEST WALL OSCILL: CPT

## 2025-01-20 PROCEDURE — 99255 IP/OBS CONSLTJ NEW/EST HI 80: CPT | Performed by: INTERNAL MEDICINE

## 2025-01-20 PROCEDURE — 36415 COLL VENOUS BLD VENIPUNCTURE: CPT

## 2025-01-20 PROCEDURE — 2700000000 HC OXYGEN THERAPY PER DAY

## 2025-01-20 PROCEDURE — 94760 N-INVAS EAR/PLS OXIMETRY 1: CPT

## 2025-01-20 RX ORDER — ACETAMINOPHEN 325 MG/1
650 TABLET ORAL EVERY 6 HOURS PRN
Status: DISCONTINUED | OUTPATIENT
Start: 2025-01-20 | End: 2025-01-23 | Stop reason: HOSPADM

## 2025-01-20 RX ORDER — POTASSIUM CHLORIDE 7.45 MG/ML
10 INJECTION INTRAVENOUS PRN
Status: DISCONTINUED | OUTPATIENT
Start: 2025-01-20 | End: 2025-01-23 | Stop reason: HOSPADM

## 2025-01-20 RX ORDER — INSULIN GLARGINE 100 [IU]/ML
20 INJECTION, SOLUTION SUBCUTANEOUS EVERY MORNING
Status: DISCONTINUED | OUTPATIENT
Start: 2025-01-21 | End: 2025-01-23 | Stop reason: HOSPADM

## 2025-01-20 RX ORDER — ONDANSETRON 2 MG/ML
4 INJECTION INTRAMUSCULAR; INTRAVENOUS EVERY 6 HOURS PRN
Status: DISCONTINUED | OUTPATIENT
Start: 2025-01-20 | End: 2025-01-23 | Stop reason: HOSPADM

## 2025-01-20 RX ORDER — FLUTICASONE PROPIONATE 50 MCG
2 SPRAY, SUSPENSION (ML) NASAL DAILY
Status: DISCONTINUED | OUTPATIENT
Start: 2025-01-20 | End: 2025-01-20 | Stop reason: ALTCHOICE

## 2025-01-20 RX ORDER — MECLIZINE HCL 12.5 MG 12.5 MG/1
25 TABLET ORAL 3 TIMES DAILY PRN
Status: DISCONTINUED | OUTPATIENT
Start: 2025-01-20 | End: 2025-01-23 | Stop reason: HOSPADM

## 2025-01-20 RX ORDER — ACETAMINOPHEN 650 MG/1
650 SUPPOSITORY RECTAL EVERY 6 HOURS PRN
Status: DISCONTINUED | OUTPATIENT
Start: 2025-01-20 | End: 2025-01-23 | Stop reason: HOSPADM

## 2025-01-20 RX ORDER — DEXTROSE MONOHYDRATE 100 MG/ML
INJECTION, SOLUTION INTRAVENOUS CONTINUOUS PRN
Status: DISCONTINUED | OUTPATIENT
Start: 2025-01-20 | End: 2025-01-23 | Stop reason: HOSPADM

## 2025-01-20 RX ORDER — SODIUM CHLORIDE 9 MG/ML
INJECTION, SOLUTION INTRAVENOUS PRN
Status: DISCONTINUED | OUTPATIENT
Start: 2025-01-20 | End: 2025-01-23 | Stop reason: HOSPADM

## 2025-01-20 RX ORDER — PREDNISONE 20 MG/1
40 TABLET ORAL DAILY
Status: DISCONTINUED | OUTPATIENT
Start: 2025-01-20 | End: 2025-01-21

## 2025-01-20 RX ORDER — GLUCAGON 1 MG/ML
1 KIT INJECTION PRN
Status: DISCONTINUED | OUTPATIENT
Start: 2025-01-20 | End: 2025-01-23 | Stop reason: HOSPADM

## 2025-01-20 RX ORDER — IPRATROPIUM BROMIDE AND ALBUTEROL SULFATE 2.5; .5 MG/3ML; MG/3ML
1 SOLUTION RESPIRATORY (INHALATION)
Status: DISCONTINUED | OUTPATIENT
Start: 2025-01-20 | End: 2025-01-23 | Stop reason: HOSPADM

## 2025-01-20 RX ORDER — BUDESONIDE AND FORMOTEROL FUMARATE DIHYDRATE 160; 4.5 UG/1; UG/1
2 AEROSOL RESPIRATORY (INHALATION)
Status: DISCONTINUED | OUTPATIENT
Start: 2025-01-20 | End: 2025-01-23 | Stop reason: HOSPADM

## 2025-01-20 RX ORDER — INSULIN LISPRO 100 [IU]/ML
0-8 INJECTION, SOLUTION INTRAVENOUS; SUBCUTANEOUS
Status: DISCONTINUED | OUTPATIENT
Start: 2025-01-20 | End: 2025-01-23 | Stop reason: HOSPADM

## 2025-01-20 RX ORDER — SODIUM CHLORIDE 0.9 % (FLUSH) 0.9 %
5-40 SYRINGE (ML) INJECTION PRN
Status: DISCONTINUED | OUTPATIENT
Start: 2025-01-20 | End: 2025-01-23 | Stop reason: HOSPADM

## 2025-01-20 RX ORDER — CLOPIDOGREL BISULFATE 75 MG/1
75 TABLET ORAL DAILY
Status: DISCONTINUED | OUTPATIENT
Start: 2025-01-20 | End: 2025-01-23 | Stop reason: HOSPADM

## 2025-01-20 RX ORDER — GUAIFENESIN 600 MG/1
1200 TABLET, EXTENDED RELEASE ORAL 2 TIMES DAILY
Status: DISCONTINUED | OUTPATIENT
Start: 2025-01-20 | End: 2025-01-23 | Stop reason: HOSPADM

## 2025-01-20 RX ORDER — GUAIFENESIN 600 MG/1
1200 TABLET, EXTENDED RELEASE ORAL 2 TIMES DAILY
COMMUNITY

## 2025-01-20 RX ORDER — POTASSIUM CHLORIDE 1500 MG/1
40 TABLET, EXTENDED RELEASE ORAL PRN
Status: DISCONTINUED | OUTPATIENT
Start: 2025-01-20 | End: 2025-01-23 | Stop reason: HOSPADM

## 2025-01-20 RX ORDER — SUCRALFATE 1 G/1
1 TABLET ORAL
Status: DISCONTINUED | OUTPATIENT
Start: 2025-01-20 | End: 2025-01-23 | Stop reason: HOSPADM

## 2025-01-20 RX ORDER — 0.9 % SODIUM CHLORIDE 0.9 %
250 INTRAVENOUS SOLUTION INTRAVENOUS ONCE
Status: COMPLETED | OUTPATIENT
Start: 2025-01-20 | End: 2025-01-20

## 2025-01-20 RX ORDER — INSULIN LISPRO 100 [IU]/ML
10 INJECTION, SOLUTION INTRAVENOUS; SUBCUTANEOUS
Status: DISCONTINUED | OUTPATIENT
Start: 2025-01-20 | End: 2025-01-20

## 2025-01-20 RX ORDER — ASPIRIN 81 MG/1
81 TABLET ORAL DAILY
Status: DISCONTINUED | OUTPATIENT
Start: 2025-01-20 | End: 2025-01-23 | Stop reason: HOSPADM

## 2025-01-20 RX ORDER — NICOTINE 21 MG/24HR
1 PATCH, TRANSDERMAL 24 HOURS TRANSDERMAL DAILY
Status: DISCONTINUED | OUTPATIENT
Start: 2025-01-20 | End: 2025-01-23 | Stop reason: HOSPADM

## 2025-01-20 RX ORDER — FAMOTIDINE 20 MG/1
40 TABLET, FILM COATED ORAL DAILY
Status: DISCONTINUED | OUTPATIENT
Start: 2025-01-20 | End: 2025-01-20

## 2025-01-20 RX ORDER — POLYETHYLENE GLYCOL 3350 17 G/17G
17 POWDER, FOR SOLUTION ORAL DAILY PRN
Status: DISCONTINUED | OUTPATIENT
Start: 2025-01-20 | End: 2025-01-23 | Stop reason: HOSPADM

## 2025-01-20 RX ORDER — ENOXAPARIN SODIUM 100 MG/ML
40 INJECTION SUBCUTANEOUS DAILY
Status: DISCONTINUED | OUTPATIENT
Start: 2025-01-20 | End: 2025-01-23 | Stop reason: HOSPADM

## 2025-01-20 RX ORDER — IPRATROPIUM BROMIDE AND ALBUTEROL SULFATE 2.5; .5 MG/3ML; MG/3ML
1 SOLUTION RESPIRATORY (INHALATION) ONCE
Status: COMPLETED | OUTPATIENT
Start: 2025-01-20 | End: 2025-01-20

## 2025-01-20 RX ORDER — MIDODRINE HYDROCHLORIDE 5 MG/1
10 TABLET ORAL
Status: DISCONTINUED | OUTPATIENT
Start: 2025-01-20 | End: 2025-01-22

## 2025-01-20 RX ORDER — SODIUM CHLORIDE 0.9 % (FLUSH) 0.9 %
5-40 SYRINGE (ML) INJECTION EVERY 12 HOURS SCHEDULED
Status: DISCONTINUED | OUTPATIENT
Start: 2025-01-20 | End: 2025-01-23 | Stop reason: HOSPADM

## 2025-01-20 RX ORDER — PANTOPRAZOLE SODIUM 40 MG/10ML
40 INJECTION, POWDER, LYOPHILIZED, FOR SOLUTION INTRAVENOUS DAILY
Status: DISCONTINUED | OUTPATIENT
Start: 2025-01-20 | End: 2025-01-20

## 2025-01-20 RX ORDER — ONDANSETRON 4 MG/1
4 TABLET, ORALLY DISINTEGRATING ORAL EVERY 8 HOURS PRN
Status: DISCONTINUED | OUTPATIENT
Start: 2025-01-20 | End: 2025-01-23 | Stop reason: HOSPADM

## 2025-01-20 RX ORDER — MAGNESIUM HYDROXIDE/ALUMINUM HYDROXICE/SIMETHICONE 120; 1200; 1200 MG/30ML; MG/30ML; MG/30ML
30 SUSPENSION ORAL EVERY 6 HOURS PRN
Status: DISCONTINUED | OUTPATIENT
Start: 2025-01-20 | End: 2025-01-23 | Stop reason: HOSPADM

## 2025-01-20 RX ORDER — MAGNESIUM SULFATE IN WATER 40 MG/ML
2000 INJECTION, SOLUTION INTRAVENOUS PRN
Status: DISCONTINUED | OUTPATIENT
Start: 2025-01-20 | End: 2025-01-23 | Stop reason: HOSPADM

## 2025-01-20 RX ORDER — INSULIN LISPRO 100 [IU]/ML
5 INJECTION, SOLUTION INTRAVENOUS; SUBCUTANEOUS
Status: DISCONTINUED | OUTPATIENT
Start: 2025-01-21 | End: 2025-01-23 | Stop reason: HOSPADM

## 2025-01-20 RX ORDER — HYDROCODONE BITARTRATE AND ACETAMINOPHEN 10; 325 MG/1; MG/1
1 TABLET ORAL EVERY 6 HOURS PRN
Status: DISCONTINUED | OUTPATIENT
Start: 2025-01-20 | End: 2025-01-23 | Stop reason: HOSPADM

## 2025-01-20 RX ORDER — ASPIRIN 325 MG
325 TABLET ORAL ONCE
Status: COMPLETED | OUTPATIENT
Start: 2025-01-20 | End: 2025-01-20

## 2025-01-20 RX ORDER — BUDESONIDE AND FORMOTEROL FUMARATE DIHYDRATE 160; 4.5 UG/1; UG/1
2 AEROSOL RESPIRATORY (INHALATION)
Status: DISCONTINUED | OUTPATIENT
Start: 2025-01-20 | End: 2025-01-20

## 2025-01-20 RX ORDER — ONDANSETRON 2 MG/ML
4 INJECTION INTRAMUSCULAR; INTRAVENOUS ONCE
Status: COMPLETED | OUTPATIENT
Start: 2025-01-20 | End: 2025-01-20

## 2025-01-20 RX ORDER — PANTOPRAZOLE SODIUM 40 MG/1
40 TABLET, DELAYED RELEASE ORAL
Status: DISCONTINUED | OUTPATIENT
Start: 2025-01-21 | End: 2025-01-21

## 2025-01-20 RX ORDER — INSULIN GLARGINE 100 [IU]/ML
20 INJECTION, SOLUTION SUBCUTANEOUS 2 TIMES DAILY
Status: DISCONTINUED | OUTPATIENT
Start: 2025-01-20 | End: 2025-01-20

## 2025-01-20 RX ADMIN — GUAIFENESIN 1200 MG: 600 TABLET ORAL at 20:07

## 2025-01-20 RX ADMIN — INSULIN GLARGINE 20 UNITS: 100 INJECTION, SOLUTION SUBCUTANEOUS at 12:53

## 2025-01-20 RX ADMIN — PANTOPRAZOLE SODIUM 40 MG: 40 INJECTION, POWDER, FOR SOLUTION INTRAVENOUS at 13:05

## 2025-01-20 RX ADMIN — ACETAMINOPHEN 650 MG: 325 TABLET ORAL at 16:05

## 2025-01-20 RX ADMIN — PREDNISONE 40 MG: 20 TABLET ORAL at 18:31

## 2025-01-20 RX ADMIN — SUCRALFATE 1 G: 1 TABLET ORAL at 12:58

## 2025-01-20 RX ADMIN — ONDANSETRON 4 MG: 2 INJECTION, SOLUTION INTRAMUSCULAR; INTRAVENOUS at 09:37

## 2025-01-20 RX ADMIN — HYDROCODONE BITARTRATE AND ACETAMINOPHEN 1 TABLET: 10; 325 TABLET ORAL at 12:52

## 2025-01-20 RX ADMIN — IPRATROPIUM BROMIDE AND ALBUTEROL SULFATE 1 DOSE: .5; 3 SOLUTION RESPIRATORY (INHALATION) at 15:30

## 2025-01-20 RX ADMIN — SODIUM CHLORIDE 250 ML: 9 INJECTION, SOLUTION INTRAVENOUS at 08:25

## 2025-01-20 RX ADMIN — HYDROCODONE BITARTRATE AND ACETAMINOPHEN 1 TABLET: 10; 325 TABLET ORAL at 20:07

## 2025-01-20 RX ADMIN — ALUMINUM HYDROXIDE, MAGNESIUM HYDROXIDE, AND SIMETHICONE 30 ML: 200; 200; 20 SUSPENSION ORAL at 21:26

## 2025-01-20 RX ADMIN — MIDODRINE HYDROCHLORIDE 10 MG: 5 TABLET ORAL at 12:52

## 2025-01-20 RX ADMIN — INSULIN GLARGINE 20 UNITS: 100 INJECTION, SOLUTION SUBCUTANEOUS at 20:07

## 2025-01-20 RX ADMIN — MIDODRINE HYDROCHLORIDE 10 MG: 5 TABLET ORAL at 18:31

## 2025-01-20 RX ADMIN — ASPIRIN 325 MG: 325 TABLET ORAL at 09:37

## 2025-01-20 RX ADMIN — CLOPIDOGREL BISULFATE 75 MG: 75 TABLET ORAL at 12:53

## 2025-01-20 RX ADMIN — SODIUM CHLORIDE 250 ML: 9 INJECTION, SOLUTION INTRAVENOUS at 12:45

## 2025-01-20 RX ADMIN — Medication 2 PUFF: at 20:57

## 2025-01-20 RX ADMIN — ASPIRIN 81 MG: 81 TABLET, COATED ORAL at 12:52

## 2025-01-20 RX ADMIN — IPRATROPIUM BROMIDE AND ALBUTEROL SULFATE 1 DOSE: .5; 3 SOLUTION RESPIRATORY (INHALATION) at 20:57

## 2025-01-20 RX ADMIN — INSULIN LISPRO 10 UNITS: 100 INJECTION, SOLUTION INTRAVENOUS; SUBCUTANEOUS at 13:06

## 2025-01-20 RX ADMIN — IPRATROPIUM BROMIDE AND ALBUTEROL SULFATE 1 DOSE: .5; 3 SOLUTION RESPIRATORY (INHALATION) at 09:42

## 2025-01-20 RX ADMIN — ENOXAPARIN SODIUM 40 MG: 100 INJECTION SUBCUTANEOUS at 12:53

## 2025-01-20 RX ADMIN — GUAIFENESIN 1200 MG: 600 TABLET ORAL at 13:16

## 2025-01-20 RX ADMIN — Medication 10 ML: at 20:07

## 2025-01-20 RX ADMIN — IPRATROPIUM BROMIDE AND ALBUTEROL SULFATE 1 DOSE: .5; 3 SOLUTION RESPIRATORY (INHALATION) at 12:33

## 2025-01-20 ASSESSMENT — PAIN SCALES - GENERAL
PAINLEVEL_OUTOF10: 8
PAINLEVEL_OUTOF10: 6
PAINLEVEL_OUTOF10: 4
PAINLEVEL_OUTOF10: 8
PAINLEVEL_OUTOF10: 5

## 2025-01-20 ASSESSMENT — PAIN DESCRIPTION - LOCATION
LOCATION: GENERALIZED
LOCATION: GENERALIZED

## 2025-01-20 NOTE — CONSULTS
[facility] Pulmonary [facility] Critical [facility]   Consult Note      Reason for Consult: Altered mental status/chronic hypercapnic respiratory failure  Requesting Physician: Heidi Silva    Subjective:   CHIEF COMPLAINT: Shortness of breath and altered mental status     HPI: Patient was recently hospitalized between  and  with COPD exacerbation, treated with antibiotics/steroids and discharged on NIV for chronic hypercapnic respiratory failure.   noted that patient did not do well since discharge, increased somnolence and tremors.  Hence brought her to the hospital once again for an evaluation.    History of COPD on 2 L O2, chronic prednisone dependence-as high as 80 mg/day, morbid obesity, CAD/CHF with diastolic dysfunction.  States that she has actually quit smoking since her recent hospitalization.  No prior PFTs available.       The patient is a 49 y.o. female with significant past medical history of:      Diagnosis Date    Allergic     Anemia     Asthma     CAD (coronary artery disease)     Stent LAD    Chronic back pain     back    Chronic kidney disease     kidney stones    COPD (chronic obstructive pulmonary disease) (HCC)     Diabetes mellitus (HCC)     gestional diabetes only    GERD (gastroesophageal reflux disease)     Hyperlipidemia     Hypertension     Narcolepsy     Pancreatitis 10/01/2011    Pneumonia 2008    Psychiatric problem     anxiewty, depression    Ulcer     stomach        Past Surgical History:        Procedure Laterality Date    CARDIAC CATHETERIZATION  2010, 2011    Dr. Wahl     SECTION      CHOLECYSTECTOMY, LAPAROSCOPIC  10/5/2011    with intraoperative cholangiogram    EYE SURGERY      age 1    HYSTERECTOMY (CERVIX STATUS UNKNOWN)      TUBAL LIGATION       Current Medications:    Current Facility-Administered Medications: sodium chloride flush 0.9 % injection 5-40 mL, 5-40 mL, IntraVENous, 2 times per day  sodium chloride flush 0.9 % injection 5-40 mL, 5-40 mL,

## 2025-01-20 NOTE — ED NOTES
Patient Name: Cassandra Bowser  : 1975 49 y.o.  MRN: [id number]  ED Room #: ED-0012/12     Chief complaint:   Chief Complaint   Patient presents with    Hypoxia     Pt states her daughter was watching her sleep and checking her 02 saturations. States her O2 was low even on her bipap. States it got down to 79%. States her daughter woke her up and it was still low.      Hospital Problem/Diagnosis:   Hospital Problems             Last Modified POA    * (Principal) Acute on chronic hypoxic respiratory failure 2025 Yes         O2 Flow Rate:O2 Device: Nasal cannula O2 Flow Rate (L/min): 3 L/min (if applicable)  Cardiac Rhythm:   (if applicable)  Active LDA's:   Peripheral IV 25 Right Antecubital (Active)            How does patient ambulate? Unknown, did not assess in the Emergency Department    2. How does patient take pills? Whole with Water    3. Is patient alert? Alert    4. Is patient oriented? To Person, To Place, To Time, and To Situation    5.   Patient arrived from:  home  Facility Name: ___________________________________________    6. If patient is disoriented or from a Skill Nursing Facility has family been notified of admission? No    7. Patient belongings?     Disposition of belongings?     8. Any specific patient or family belongings/needs/dynamics?   a.     9. Miscellaneous comments/pending orders?  a.      If there are any additional questions please reach out to the Emergency Department.

## 2025-01-20 NOTE — PROGRESS NOTES
Patient is from home with spouse, A&O x4, up to BSC with SBA and walker. Nasal cannula at 3 liters, wears 2 liters at home. States she wears 3 liters with her bipap at HS.

## 2025-01-20 NOTE — PROGRESS NOTES
Patient lethargic, will wake up to take medications and answer questions but then immediately falls back asleep.  states she is like this at home off and on too but it has been worse since her discharge home Saturday. Currently on Bipap continuously. Spouse helped update her home med list. Prior to her last admission she was smoking up to 4 packs of cigarettes a day. Patch applied. Call light within reach.

## 2025-01-20 NOTE — PROGRESS NOTES
[facility] Daily Progress Note      Admit Date:  1/20/2025      Cardiology consult: Chest pain    Subjective:  Ms. Bowser continues to have complaints of shortness of breath and fatigue.  She is wondering whether her anemia is contributing.     Relevant available office/hospital notes, medications, labs, imaging and cardiovascular diagnostics were reviewed.     History of present illness:   Cassandra Bowser has a  PMH of CAD, COPD, diabetes mellitus, hypertension, dyslipidemia, narcolepsy, obesity who presented to the hospital with complaints of worsening shortness of breath.  She had recently been hospitalized from 1/13/2025 to 1/18/2025.  She was discharged and then presented to 1/20/2025.  She describes intermittent chest discomfort.  She has also had hypoxia and O2 saturations in had also developed hypotension.  She follows with CHF service, Dr. Weaver.       Objective:   BP 95/63   Pulse 91   Temp 97.4 °F (36.3 °C) (Temporal)   Resp 22   Wt 114.8 kg (253 lb)   LMP 10/15/2012   SpO2 99%   BMI 47.80 kg/m²     Intake/Output Summary (Last 24 hours) at 1/21/2025 1043  Last data filed at 1/21/2025 0145  Gross per 24 hour   Intake 180 ml   Output 1225 ml   Net -1045 ml       Physical Exam:  General:  Awake, alert, oriented x 3, NAD  Skin:  Warm and dry  Neck:  JVD is difficult to assess  Chest:  decreased air exchange bibasilar  Cardiovascular:  RRR S1S2, no S3, 2/6 systolic at lower left sternal border  Abdomen:  Soft, ND, NT, No HSM  Extremities:  Trace edema    Medications:    sodium chloride flush  5-40 mL IntraVENous 2 times per day    enoxaparin  40 mg SubCUTAneous Daily    insulin lispro  0-8 Units SubCUTAneous 4x Daily AC & HS    clopidogrel  75 mg Oral Daily    budesonide-formoterol  2 puff Inhalation BID RT    ipratropium 0.5 mg-albuterol 2.5 mg  1 Dose Inhalation Q4H WA RT    aspirin  81 mg Oral Daily    sucralfate  1 g Oral TID AC    midodrine  10 mg Oral TID WC    guaiFENesin  1,200  artery  calcifications.  There is no acute abnormality of the thoracic aorta.     Cardiac cath [facility] 11/22/2022:  LVEDP was elevated at 23mmHg.  There was a mild pressure gradient on   pullback across the aortic valve.     Patent mid LAD and RCA (mid and distal) stents.  There is otherwise   mild-to-moderate disease, as described.        Discussed with Dr. Zhang.  Will start lasix, 40mg PO daily.  Plan for BMP   in 1 week and close f/u with Dr. Zhang.  Continue secondary CAD   prevention.     Coronary Findings Diagnostic Dominance: Right   Left Main: Distal 20%   Left Anterior Descending: Proximal 30%. Mid stent patent with 30% diffuse ISR. Distal 10-20% diffuse disease. The apical vessel tapers into a small caliber vessel. D1 has mild luminal irregularities.   Ramus Intermedius: Proximal 30%   Left Circumflex: Very small caliber vessel with moderate diffuse disease.   Right Coronary Artery: Proximal 40%; mid stent patent; distal stent patent into rPL. rPD has ostial 40% discrete stenosis. rPL has a patent proximal stent.     Intervention   No interventions have been documented.     Left Ventricle   LV end diastolic pressure is elevated. LVEDP-23mmHg     Aortic Valve   There is mild AV gradient.      Cardiac cath 4/26/2021:  Findings:  Artery Findings/Result   LM Normal   LAD Patent mid stent with 30% instent   Cx 90% mid small caliber ~2mm   RI patent   RCA Patent distal stent, 95% third PLB, 50% ostial to proximal PDA   LVEDP 15-25 (coughing)   LVG NA      Intervention:         PCI of PLB     Post Cath Dx:       CAD as above     CT chest w/o contrast 11/11/2019:  IMPRESSION:  1. Redemonstration of multiple pulmonary nodules mostly in the right upper  lung some new, with the largest measuring up to 7.4 mm.  No active pleural  disease.  This process may represent granulomatous disease although continued  surveillance is recommended.  2. No evidence of mediastinal lymphadenopathy.  Calcified mediastinal

## 2025-01-20 NOTE — ED PROVIDER NOTES
[facility] Emergency Department      Pt Name: Cassandra Bowser  MRN: [id number]  Birthdate 1975  Date of evaluation: 1/20/2025  Provider: SALONI ZUÑIGA MD  CHIEF COMPLAINT  Chief Complaint   Patient presents with    Hypoxia     Pt states her daughter was watching her sleep and checking her 02 saturations. States her O2 was low even on her bipap. States it got down to 79%. States her daughter woke her up and it was still low.      HPI  Cassandra Bowser is a 49 y.o. female who presents because of low O2 sats.  She has a history of COPD, CHF, CKD.  She was just admitted to the hospital for COPD exacerbation and volume overload.  She was released from the hospital on Saturday.  She was discharged with BiPAP which she did not use previously.  She was not given Lasix prescription go home with so they called the doctor on Sunday and got a refill which she took last night.  She was also started on gabapentin during her admission.  She has felt sleepy and foggy headed since she went home.  She denies any known fever.  She does have a congested feeling in her chest.  She quit smoking last week and is now using a vape.  She did not take any of her morning meds today.    REVIEW OF SYSTEMS:  No fever, no abdominal pain Pertinent positives and negatives as per the HPI.  All other pertinent review of systems reviewed and negative.  Nursing notes reviewed.    PAST MEDICAL HISTORY  Past Medical History:   Diagnosis Date    Allergic     Anemia     Asthma     CAD (coronary artery disease)     Stent LAD    Chronic back pain     back    Chronic kidney disease     kidney stones    COPD (chronic obstructive pulmonary disease) (HCC)     Diabetes mellitus (HCC)     gestional diabetes only    GERD (gastroesophageal reflux disease)     Hyperlipidemia     Hypertension     Narcolepsy     Pancreatitis 10/01/2011    Pneumonia 01/01/2008    Psychiatric problem     anxiewty, depression    Ulcer     stomach     SURGICAL HISTORY  Past Surgical

## 2025-01-20 NOTE — CONSULTS
[facility]  Interventional Cardiology Consultation  [phone]      Chief Complaint   Patient presents with    Hypoxia     Pt states her daughter was watching her sleep and checking her 02 saturations. States her O2 was low even on her bipap. States it got down to 79%. States her daughter woke her up and it was still low.          History of Present Illness:  Cassandra Bowser is a 49 y.o. patient with a PMH of CAD, COPD, diabetes mellitus, hypertension, dyslipidemia, narcolepsy, obesity who presented to the hospital with complaints of worsening shortness of breath.  She had recently been hospitalized from 2025 to 2025.  She was discharged and then presented to 2025.  She describes intermittent chest discomfort.  She has also had hypoxia and O2 saturations in had also developed hypotension.  She follows with CHF service, Dr. Weaver.       Relevant available office/hospital notes, medications, labs, imaging and cardiovascular diagnostics were reviewed.     Past Medical History:   has a past medical history of Allergic, Anemia, Asthma, CAD (coronary artery disease), Chronic back pain, Chronic kidney disease, COPD (chronic obstructive pulmonary disease) (HCC), Diabetes mellitus (HCC), GERD (gastroesophageal reflux disease), Hyperlipidemia, Hypertension, Narcolepsy, Pancreatitis, Pneumonia, Psychiatric problem, and Ulcer.    Surgical History:   has a past surgical history that includes Tubal ligation;  section; eye surgery; Cholecystectomy, laparoscopic (10/5/2011); Cardiac catheterization (2010, 2011); and Hysterectomy.     Social History:   reports that she has been smoking cigarettes. She has never been exposed to tobacco smoke. She has never used smokeless tobacco. She reports that she does not drink alcohol and does not use drugs.     Family History:  family history includes Heart Disease in her brother, father, mother, and sister; Sleep Apnea in her brother.    Home

## 2025-01-20 NOTE — H&P
V2.0  History and Physical      Name:  Cassandra Bowser /Age/Sex: 1975  (49 y.o. female)   MRN & CSN:  [id number] & [id number] Encounter Date/Time: 2025 9:30 AM EST   Location:  [id number]- PCP: Cody Solano MD       Hospital Day: 1    Assessment and Plan:   Csasandra Bowser is a 49 y.o. female with a pmh of tobacco abuse, COPD with chronic hypoxic respiratory failure on 2 L/min, CAD, obesity, T2 DM, HTN, morbid obesity, recently admitted here for acute on chronic hypoxic/hypercapnic respiratory failure due to suspected decompensated HFpEF and AE-COPD with rhinovirus infection readmitted with complaints of hypoxic episodes on BiPAP, increasing somnolence and hypotension.      Hospital Problems             Last Modified POA    * (Principal) Acute on chronic hypoxic respiratory failure 2025 Yes       Plan:    Hypotension:   Lisinopril, torsemide, Aldactone, and Toprol-XL on hold.  Follow-up infectious workup; leukocytosis likely due to steroids.  Started on IVF and midodrine; will monitor closely.  Doubt sepsis; hold off antibiotics for now.    Acute on chronic hypoxic respiratory failure with hypercapnia:  ABG reviewed. Currently requiring 3L/min.  Nightly BiPAP ordered.  Pulm consulted for evaluation and possible adjustment of BiPAP settings.    COPD without acute exacerbation: Continue inhalers.    T2DM on long-term insulin: Follow-up A1c.  Monitor on basal/prandial insulin with SSI.    Suspected diastolic CHF: Doubt acute decompensation  Cardiology consulted : May have chronic diastolic HF, but echo not suggestive of that. proBNP 773, 184.  Chest x-ray unchanged from prior.  Hold Lasix and Aldactone.    Elevated troponin:  HS- troponin: 74, 65. 32 on 2025.  EKG with no acute ST-T changes.  TTE 2025: LVEF 55 to 60%. Unable to assess wall motion.  Cardiology consulted.    Iron deficiency anemia: Received IV iron on recent admission.  Monitor H&H and transfuse prn.   Goal Hgb  results found for: \"BC\"  No results found for: \"BLOODCULT2\"  Organism:   Lab Results   Component Value Date/Time    ORG Human Rhinovirus/Enterovirus by PCR 01/14/2025 02:00 PM       Imaging/Diagnostics Last 24 Hours   XR CHEST PORTABLE    Result Date: 1/20/2025  EXAMINATION: ONE XRAY VIEW OF THE CHEST 1/20/2025 7:25 am COMPARISON: 13 January 2025 HISTORY: ORDERING SYSTEM PROVIDED HISTORY: CP TECHNOLOGIST PROVIDED HISTORY: Reason for exam:->CP FINDINGS: Lungs: Well inflated. Normal bronchovascular markings. Heart and mediastinum: Mild cardiac enlargement with vascular prominence. Trachea is midline.Kimber are symmetrical, no mass. Osseous structures: Unremarkable Abdomen: Nonspecific bowel gas pattern.  Large body habitus.     1. Mild cardiac enlargement with vascular prominence. 2. No significant interval change.     Echo (TTE) complete (PRN contrast/bubble/strain/3D)    Result Date: 1/14/2025    Left Ventricle: Normal left ventricular systolic function with a visually estimated EF of 55 - 60%. Left ventricle size is normal. Normal wall thickness. Unable to assess wall motion.   Right Ventricle: Not well visualized.   Image quality is poor. Contrast used: Lumason. Technically difficult study due to patient's body habitus.     CT CHEST PULMONARY EMBOLISM W CONTRAST    Result Date: 1/13/2025  EXAMINATION: CTA OF THE CHEST 1/13/2025 7:37 pm TECHNIQUE: CTA of the chest was performed after the administration of intravenous contrast.  Multiplanar reformatted images are provided for review.  MIP images are provided for review. Automated exposure control, iterative reconstruction, and/or weight based adjustment of the mA/kV was utilized to reduce the radiation dose to as low as reasonably achievable. COMPARISON: CT chest 02/28/2022 HISTORY: ORDERING SYSTEM PROVIDED HISTORY: r/o pe TECHNOLOGIST PROVIDED HISTORY: Reason for exam:->r/o pe Additional Contrast?->1 Reason for Exam: r/o pe FINDINGS: Pulmonary Arteries: Pulmonary

## 2025-01-21 LAB
ANION GAP SERPL CALCULATED.3IONS-SCNC: 9 MMOL/L (ref 3–16)
BACTERIA URNS QL MICRO: ABNORMAL /HPF
BASE EXCESS BLDA CALC-SCNC: 6.7 MMOL/L (ref -3–3)
BILIRUB UR QL STRIP.AUTO: NEGATIVE
BUN SERPL-MCNC: 28 MG/DL (ref 7–20)
CALCIUM SERPL-MCNC: 8.8 MG/DL (ref 8.3–10.6)
CHLORIDE SERPL-SCNC: 101 MMOL/L (ref 99–110)
CHOLEST SERPL-MCNC: 166 MG/DL (ref 0–199)
CLARITY UR: ABNORMAL
CO2 BLDA-SCNC: 75.8 MMOL/L
CO2 SERPL-SCNC: 27 MMOL/L (ref 21–32)
COHGB MFR BLDA: 2.7 % (ref 0–1.5)
COLOR UR: YELLOW
CREAT SERPL-MCNC: 0.7 MG/DL (ref 0.6–1.1)
DEPRECATED RDW RBC AUTO: 19.7 % (ref 12.4–15.4)
EPI CELLS #/AREA URNS AUTO: 10 /HPF (ref 0–5)
EST. AVERAGE GLUCOSE BLD GHB EST-MCNC: 125.5 MG/DL
FERRITIN SERPL IA-MCNC: 108 NG/ML (ref 15–150)
GFR SERPLBLD CREATININE-BSD FMLA CKD-EPI: >90 ML/MIN/{1.73_M2}
GLUCOSE BLD-MCNC: 238 MG/DL (ref 70–99)
GLUCOSE BLD-MCNC: 239 MG/DL (ref 70–99)
GLUCOSE BLD-MCNC: 349 MG/DL (ref 70–99)
GLUCOSE BLD-MCNC: 384 MG/DL (ref 70–99)
GLUCOSE SERPL-MCNC: 249 MG/DL (ref 70–99)
GLUCOSE UR STRIP.AUTO-MCNC: 250 MG/DL
HBA1C MFR BLD: 6 %
HCO3 BLDA-SCNC: 32.3 MMOL/L (ref 21–29)
HCT VFR BLD AUTO: 27.7 % (ref 36–48)
HDLC SERPL-MCNC: 49 MG/DL (ref 40–60)
HGB BLD-MCNC: 8.4 G/DL (ref 12–16)
HGB BLDA-MCNC: 8.1 G/DL (ref 12–16)
HGB UR QL STRIP.AUTO: NEGATIVE
HYALINE CASTS #/AREA URNS AUTO: 8 /LPF (ref 0–8)
IRON SATN MFR SERPL: 11 % (ref 15–50)
IRON SERPL-MCNC: 33 UG/DL (ref 37–145)
KETONES UR STRIP.AUTO-MCNC: 15 MG/DL
LDLC SERPL CALC-MCNC: 95 MG/DL
LEUKOCYTE ESTERASE UR QL STRIP.AUTO: ABNORMAL
MCH RBC QN AUTO: 25.8 PG (ref 26–34)
MCHC RBC AUTO-ENTMCNC: 30.2 G/DL (ref 31–36)
MCV RBC AUTO: 85.4 FL (ref 80–100)
METHGB MFR BLDA: 0.8 %
NITRITE UR QL STRIP.AUTO: POSITIVE
O2 THERAPY: ABNORMAL
PCO2 BLDA: 51.4 MMHG (ref 35–45)
PERFORMED ON: ABNORMAL
PH BLDA: 7.41 [PH] (ref 7.35–7.45)
PH UR STRIP.AUTO: 7.5 [PH] (ref 5–8)
PLATELET # BLD AUTO: 139 K/UL (ref 135–450)
PMV BLD AUTO: 9.8 FL (ref 5–10.5)
PO2 BLDA: 133 MMHG (ref 75–108)
POTASSIUM SERPL-SCNC: 5.4 MMOL/L (ref 3.5–5.1)
PROT UR STRIP.AUTO-MCNC: ABNORMAL MG/DL
RBC # BLD AUTO: 3.24 M/UL (ref 4–5.2)
RBC CLUMPS #/AREA URNS AUTO: 2 /HPF (ref 0–4)
SAO2 % BLDA: 100 %
SODIUM SERPL-SCNC: 137 MMOL/L (ref 136–145)
SP GR UR STRIP.AUTO: 1.02 (ref 1–1.03)
TIBC SERPL-MCNC: 311 UG/DL (ref 260–445)
TRIGL SERPL-MCNC: 112 MG/DL (ref 0–150)
UA COMPLETE W REFLEX CULTURE PNL UR: YES
UA DIPSTICK W REFLEX MICRO PNL UR: YES
URN SPEC COLLECT METH UR: ABNORMAL
UROBILINOGEN UR STRIP-ACNC: 1 E.U./DL
VLDLC SERPL CALC-MCNC: 22 MG/DL
WBC # BLD AUTO: 13.2 K/UL (ref 4–11)
WBC #/AREA URNS AUTO: 26 /HPF (ref 0–5)

## 2025-01-21 PROCEDURE — 6360000002 HC RX W HCPCS: Performed by: INTERNAL MEDICINE

## 2025-01-21 PROCEDURE — 6370000000 HC RX 637 (ALT 250 FOR IP): Performed by: PHYSICIAN ASSISTANT

## 2025-01-21 PROCEDURE — 83036 HEMOGLOBIN GLYCOSYLATED A1C: CPT

## 2025-01-21 PROCEDURE — 81001 URINALYSIS AUTO W/SCOPE: CPT

## 2025-01-21 PROCEDURE — 36600 WITHDRAWAL OF ARTERIAL BLOOD: CPT

## 2025-01-21 PROCEDURE — 94761 N-INVAS EAR/PLS OXIMETRY MLT: CPT

## 2025-01-21 PROCEDURE — 80048 BASIC METABOLIC PNL TOTAL CA: CPT

## 2025-01-21 PROCEDURE — 99232 SBSQ HOSP IP/OBS MODERATE 35: CPT | Performed by: INTERNAL MEDICINE

## 2025-01-21 PROCEDURE — 85027 COMPLETE CBC AUTOMATED: CPT

## 2025-01-21 PROCEDURE — 82803 BLOOD GASES ANY COMBINATION: CPT

## 2025-01-21 PROCEDURE — 2500000003 HC RX 250 WO HCPCS: Performed by: NURSE PRACTITIONER

## 2025-01-21 PROCEDURE — 80061 LIPID PANEL: CPT

## 2025-01-21 PROCEDURE — 87086 URINE CULTURE/COLONY COUNT: CPT

## 2025-01-21 PROCEDURE — 94669 MECHANICAL CHEST WALL OSCILL: CPT

## 2025-01-21 PROCEDURE — 6360000002 HC RX W HCPCS: Performed by: NURSE PRACTITIONER

## 2025-01-21 PROCEDURE — 36415 COLL VENOUS BLD VENIPUNCTURE: CPT

## 2025-01-21 PROCEDURE — 6370000000 HC RX 637 (ALT 250 FOR IP): Performed by: INTERNAL MEDICINE

## 2025-01-21 PROCEDURE — 94660 CPAP INITIATION&MGMT: CPT

## 2025-01-21 PROCEDURE — 82728 ASSAY OF FERRITIN: CPT

## 2025-01-21 PROCEDURE — 2500000003 HC RX 250 WO HCPCS: Performed by: INTERNAL MEDICINE

## 2025-01-21 PROCEDURE — 87186 SC STD MICRODIL/AGAR DIL: CPT

## 2025-01-21 PROCEDURE — 6370000000 HC RX 637 (ALT 250 FOR IP): Performed by: NURSE PRACTITIONER

## 2025-01-21 PROCEDURE — 2700000000 HC OXYGEN THERAPY PER DAY

## 2025-01-21 PROCEDURE — 83540 ASSAY OF IRON: CPT

## 2025-01-21 PROCEDURE — 83550 IRON BINDING TEST: CPT

## 2025-01-21 PROCEDURE — 87088 URINE BACTERIA CULTURE: CPT

## 2025-01-21 PROCEDURE — 6370000000 HC RX 637 (ALT 250 FOR IP): Performed by: STUDENT IN AN ORGANIZED HEALTH CARE EDUCATION/TRAINING PROGRAM

## 2025-01-21 PROCEDURE — 2060000000 HC ICU INTERMEDIATE R&B

## 2025-01-21 PROCEDURE — 94640 AIRWAY INHALATION TREATMENT: CPT

## 2025-01-21 RX ORDER — PREDNISONE 20 MG/1
20 TABLET ORAL DAILY
Status: DISCONTINUED | OUTPATIENT
Start: 2025-01-22 | End: 2025-01-23 | Stop reason: HOSPADM

## 2025-01-21 RX ORDER — NITROFURANTOIN 25; 75 MG/1; MG/1
100 CAPSULE ORAL EVERY 12 HOURS SCHEDULED
Status: DISCONTINUED | OUTPATIENT
Start: 2025-01-21 | End: 2025-01-21

## 2025-01-21 RX ORDER — PANTOPRAZOLE SODIUM 40 MG/1
40 TABLET, DELAYED RELEASE ORAL
Status: DISCONTINUED | OUTPATIENT
Start: 2025-01-21 | End: 2025-01-23 | Stop reason: HOSPADM

## 2025-01-21 RX ADMIN — CLOPIDOGREL BISULFATE 75 MG: 75 TABLET ORAL at 09:57

## 2025-01-21 RX ADMIN — PANTOPRAZOLE SODIUM 40 MG: 40 TABLET, DELAYED RELEASE ORAL at 17:51

## 2025-01-21 RX ADMIN — INSULIN LISPRO 2 UNITS: 100 INJECTION, SOLUTION INTRAVENOUS; SUBCUTANEOUS at 13:37

## 2025-01-21 RX ADMIN — GUAIFENESIN 1200 MG: 600 TABLET ORAL at 09:57

## 2025-01-21 RX ADMIN — INSULIN LISPRO 5 UNITS: 100 INJECTION, SOLUTION INTRAVENOUS; SUBCUTANEOUS at 09:59

## 2025-01-21 RX ADMIN — INSULIN LISPRO 2 UNITS: 100 INJECTION, SOLUTION INTRAVENOUS; SUBCUTANEOUS at 09:58

## 2025-01-21 RX ADMIN — Medication 2 PUFF: at 21:54

## 2025-01-21 RX ADMIN — HYDROCODONE BITARTRATE AND ACETAMINOPHEN 1 TABLET: 10; 325 TABLET ORAL at 23:09

## 2025-01-21 RX ADMIN — Medication 10 ML: at 20:40

## 2025-01-21 RX ADMIN — IPRATROPIUM BROMIDE AND ALBUTEROL SULFATE 1 DOSE: .5; 3 SOLUTION RESPIRATORY (INHALATION) at 21:54

## 2025-01-21 RX ADMIN — INSULIN GLARGINE 20 UNITS: 100 INJECTION, SOLUTION SUBCUTANEOUS at 09:58

## 2025-01-21 RX ADMIN — HYDROCODONE BITARTRATE AND ACETAMINOPHEN 1 TABLET: 10; 325 TABLET ORAL at 10:06

## 2025-01-21 RX ADMIN — IPRATROPIUM BROMIDE AND ALBUTEROL SULFATE 1 DOSE: .5; 3 SOLUTION RESPIRATORY (INHALATION) at 07:28

## 2025-01-21 RX ADMIN — Medication 10 ML: at 09:59

## 2025-01-21 RX ADMIN — INSULIN LISPRO 8 UNITS: 100 INJECTION, SOLUTION INTRAVENOUS; SUBCUTANEOUS at 21:36

## 2025-01-21 RX ADMIN — ALUMINUM HYDROXIDE, MAGNESIUM HYDROXIDE, AND SIMETHICONE 30 ML: 200; 200; 20 SUSPENSION ORAL at 20:38

## 2025-01-21 RX ADMIN — Medication 2 PUFF: at 07:28

## 2025-01-21 RX ADMIN — SODIUM ZIRCONIUM CYCLOSILICATE 5 G: 5 POWDER, FOR SUSPENSION ORAL at 09:57

## 2025-01-21 RX ADMIN — ASPIRIN 81 MG: 81 TABLET, COATED ORAL at 09:57

## 2025-01-21 RX ADMIN — MIDODRINE HYDROCHLORIDE 10 MG: 5 TABLET ORAL at 09:57

## 2025-01-21 RX ADMIN — SUCRALFATE 1 G: 1 TABLET ORAL at 09:04

## 2025-01-21 RX ADMIN — HYDROCODONE BITARTRATE AND ACETAMINOPHEN 1 TABLET: 10; 325 TABLET ORAL at 17:51

## 2025-01-21 RX ADMIN — SUCRALFATE 1 G: 1 TABLET ORAL at 13:34

## 2025-01-21 RX ADMIN — GUAIFENESIN 1200 MG: 600 TABLET ORAL at 20:39

## 2025-01-21 RX ADMIN — IPRATROPIUM BROMIDE AND ALBUTEROL SULFATE 1 DOSE: .5; 3 SOLUTION RESPIRATORY (INHALATION) at 15:16

## 2025-01-21 RX ADMIN — INSULIN LISPRO 5 UNITS: 100 INJECTION, SOLUTION INTRAVENOUS; SUBCUTANEOUS at 13:35

## 2025-01-21 RX ADMIN — PANTOPRAZOLE SODIUM 40 MG: 40 TABLET, DELAYED RELEASE ORAL at 09:04

## 2025-01-21 RX ADMIN — WATER 1000 MG: 1 INJECTION INTRAMUSCULAR; INTRAVENOUS; SUBCUTANEOUS at 23:51

## 2025-01-21 RX ADMIN — INSULIN LISPRO 8 UNITS: 100 INJECTION, SOLUTION INTRAVENOUS; SUBCUTANEOUS at 17:53

## 2025-01-21 RX ADMIN — PREDNISONE 40 MG: 20 TABLET ORAL at 09:57

## 2025-01-21 RX ADMIN — ENOXAPARIN SODIUM 40 MG: 100 INJECTION SUBCUTANEOUS at 09:57

## 2025-01-21 RX ADMIN — ACETAMINOPHEN 650 MG: 325 TABLET ORAL at 20:39

## 2025-01-21 RX ADMIN — INSULIN LISPRO 5 UNITS: 100 INJECTION, SOLUTION INTRAVENOUS; SUBCUTANEOUS at 17:52

## 2025-01-21 RX ADMIN — HYDROCODONE BITARTRATE AND ACETAMINOPHEN 1 TABLET: 10; 325 TABLET ORAL at 01:45

## 2025-01-21 RX ADMIN — SUCRALFATE 1 G: 1 TABLET ORAL at 17:51

## 2025-01-21 ASSESSMENT — PAIN SCALES - GENERAL
PAINLEVEL_OUTOF10: 5
PAINLEVEL_OUTOF10: 8
PAINLEVEL_OUTOF10: 8
PAINLEVEL_OUTOF10: 5
PAINLEVEL_OUTOF10: 9
PAINLEVEL_OUTOF10: 7
PAINLEVEL_OUTOF10: 3
PAINLEVEL_OUTOF10: 5
PAINLEVEL_OUTOF10: 3

## 2025-01-21 ASSESSMENT — PAIN DESCRIPTION - DESCRIPTORS
DESCRIPTORS: ACHING
DESCRIPTORS: ACHING
DESCRIPTORS: BURNING
DESCRIPTORS: ACHING

## 2025-01-21 ASSESSMENT — PAIN DESCRIPTION - LOCATION
LOCATION: BACK;LEG
LOCATION: GENERALIZED
LOCATION: CHEST
LOCATION: GENERALIZED

## 2025-01-21 ASSESSMENT — PAIN - FUNCTIONAL ASSESSMENT
PAIN_FUNCTIONAL_ASSESSMENT: ACTIVITIES ARE NOT PREVENTED
PAIN_FUNCTIONAL_ASSESSMENT: ACTIVITIES ARE NOT PREVENTED

## 2025-01-21 ASSESSMENT — PAIN DESCRIPTION - ORIENTATION
ORIENTATION: MID
ORIENTATION: RIGHT;LEFT

## 2025-01-21 NOTE — CONSULTS
Gastroenterology Consult Note        Patient: Cassandra Bowser  : 1975  Acct#:      Date:  2025      1. Dyspnea, unspecified type    2. Elevated troponin        Subjective:       History of Present Illness  Patient is a 49 y.o.  female admitted with Elevated troponin [R79.89]  Dyspnea, unspecified type [R06.00]  Acute on chronic hypoxic respiratory failure [J96.21] who is seen in consult for worsening GERD.  History of CAD with prior stent, COPD, hypertension, hyperlipidemia, GERD, gallstone pancreatitis in  s/p cholecystectomy.    She had a recent admission for respiratory failure felt to be from COPD exacerbation and CHF exacerbation.  She came back to the hospital with shortness of breath.  Not felt to have acute COPD exacerbation or acute exacerbation of CHF currently.  There is concern for GERD causing her symptoms.  She reports history of epigastric discomfort and reflux disease.  Over the past year this has worsened and she will have pain radiate from the epigastrium into the chest.  Can feel like pressure and dull.  Can be worse with eating and has pain with swallowing.  At 1 point was told she probably had Candida and was treated.  Reports regurgitation.  Denies food getting stuck with swallowing.  No melena or hematochezia.  Has been on high-dose prednisone for the past year and reports weight gain with this.  Was on famotidine at home.  Now on Protonix, Carafate, and Maalox and feels somewhat better now.    Reports history of anemia during pregnancy and then in  and ultimately underwent hysterectomy for anemia due to large fibroids.    Past Medical History:   Diagnosis Date    Allergic     Anemia     Asthma     CAD (coronary artery disease)     Stent LAD    Chronic back pain     back    Chronic kidney disease     kidney stones    COPD (chronic obstructive pulmonary disease) (HCC)     Diabetes mellitus (HCC)     gestional diabetes only    GERD (gastroesophageal  reflux disease)     Hyperlipidemia     Hypertension     Narcolepsy     Pancreatitis 10/01/2011    Pneumonia 2008    Psychiatric problem     anxiewty, depression    Ulcer     stomach      Past Surgical History:   Procedure Laterality Date    CARDIAC CATHETERIZATION  2010, 2011    Dr. Wahl     SECTION      CHOLECYSTECTOMY, LAPAROSCOPIC  10/5/2011    with intraoperative cholangiogram    EYE SURGERY      age 1    HYSTERECTOMY (CERVIX STATUS UNKNOWN)      TUBAL LIGATION        Past Endoscopic History: none    Admission Meds  No current facility-administered medications on file prior to encounter.     Current Outpatient Medications on File Prior to Encounter   Medication Sig Dispense Refill    guaiFENesin (MUCINEX) 600 MG extended release tablet Take 2 tablets by mouth 2 times daily      meclizine (ANTIVERT) 25 MG tablet Take 1 tablet by mouth in the morning, at noon, and at bedtime for 10 days 30 tablet 0    aspirin 81 MG EC tablet Take 1 tablet by mouth daily 30 tablet 0    albuterol sulfate HFA (PROVENTIL HFA) 108 (90 Base) MCG/ACT inhaler Inhale 2 puffs into the lungs every 4 hours as needed for Wheezing 1 each 0    clopidogrel (PLAVIX) 75 MG tablet Take 1 tablet by mouth daily 30 tablet 0    fluticasone-umeclidin-vilant (TRELEGY ELLIPTA) 100-62.5-25 MCG/ACT AEPB inhaler Inhale 1 puff into the lungs daily 2 each 0    lisinopril (PRINIVIL;ZESTRIL) 10 MG tablet Take 1 tablet by mouth daily 30 tablet 0    metoprolol succinate (TOPROL XL) 25 MG extended release tablet Take 1 tablet by mouth daily 30 tablet 3    budesonide-formoterol (SYMBICORT) 160-4.5 MCG/ACT AERO Inhale 2 puffs into the lungs in the morning and 2 puffs in the evening. 10.2 g 3    nicotine (NICODERM CQ) 21 MG/24HR Place 1 patch onto the skin daily 30 patch 3    spironolactone (ALDACTONE) 25 MG tablet Take 1 tablet by mouth daily 30 tablet 3    torsemide 60 MG TABS Take 60 mg by mouth daily 30 tablet 3    famotidine (PEPCID) 40 MG

## 2025-01-21 NOTE — ACP (ADVANCE CARE PLANNING)
Advanced Care Planning Note    Purpose of Encounter: Advanced care planning in light of acute on chronic RF  Parties In Attendance: Patient, Vinnie Cardona MD, Attila  Decisional Capacity: Yes  Subjective: Patient has SOB  Objective: Cr 0.7  Goals of Care Determination: Patient wants full support  Plan: Wean O2, GI eval  Code Status: Full   Time spent on Advanced care Plannin minutes  Advanced Care Planning Documents: Completed advanced directives on chart, Attila, is the Healthcare POA.    Vinnie Cardona MD  2025 11:43 AM

## 2025-01-21 NOTE — PROGRESS NOTES
[facility] Pulmonary/CCM Progress note      Admit Date: 1/20/2025    Chief Complaint: Shortness of breath and altered mental status    Subjective:     Interval History: Slept well last night, no drowsiness this a.m.  Used the hospital AVAPS machine, since she did not have her home AVAPS machine yesterday.  No significant wheezing or cough reported.    Scheduled Meds:   sodium chloride flush  5-40 mL IntraVENous 2 times per day    enoxaparin  40 mg SubCUTAneous Daily    insulin lispro  0-8 Units SubCUTAneous 4x Daily AC & HS    clopidogrel  75 mg Oral Daily    budesonide-formoterol  2 puff Inhalation BID RT    ipratropium 0.5 mg-albuterol 2.5 mg  1 Dose Inhalation Q4H WA RT    aspirin  81 mg Oral Daily    sucralfate  1 g Oral TID AC    midodrine  10 mg Oral TID WC    guaiFENesin  1,200 mg Oral BID    nicotine  1 patch TransDERmal Daily    predniSONE  40 mg Oral Daily    insulin glargine  20 Units SubCUTAneous QAM    insulin lispro  5 Units SubCUTAneous TID WC    pantoprazole  40 mg Oral QAM AC     Continuous Infusions:   sodium chloride      dextrose       PRN Meds:sodium chloride flush, sodium chloride, potassium chloride **OR** potassium alternative oral replacement **OR** potassium chloride, magnesium sulfate, ondansetron **OR** ondansetron, polyethylene glycol, acetaminophen **OR** acetaminophen, dextrose bolus **OR** dextrose bolus, glucagon (rDNA), dextrose, HYDROcodone-acetaminophen, meclizine, aluminum & magnesium hydroxide-simethicone    Review of Systems  Constitutional: Fatigue  Ears, nose, mouth, throat: negative for ear drainage, epistaxis, hoarseness, nasal congestion, sore throat and voice change  Respiratory: negative except for shortness of breath  Cardiovascular: negative for chest pain, chest pressure/discomfort, irregular heart beat, lower extremity edema and palpitations  Gastrointestinal: negative for abdominal pain, constipation, diarrhea, jaundice, melena, odynophagia, reflux symptoms and

## 2025-01-21 NOTE — PROGRESS NOTES
Admit Date: 1/20/2025  Diet: ADULT DIET; Regular; 4 carb choices (60 gm/meal); Low Sodium (2 gm)    CC: respiratory failure    Interval history:   Overnight, there were no acute events. Patient's vitals remained stable    Patient was seen this morning. I discussed the plan of the day with her in detail. All questions were addressed and answered to patient's satisfaction.   Patient denies fevers, chills, nausea, vomiting, chest pain, shortness of breath, diarrhea, constipation, dysuria, urinary frequency or urgency.     Plan:     -Will have GI evaluate pt in light of her worsening GERD symptoms  -Wean O2 as tolerated and attempt home O2 eval tomorrow    Assessment:   Cassandra Bowser is a 49 y.o. female with PMH of COPD on 3L NC who was admitted with Acute on chronic respiratory failure    Acute on chronic hypoxic respiratory failure with hypercapnia:  ABG reviewed. Currently requiring 3L/min.  Nightly BiPAP ordered.  Pulm consulted for evaluation and possible adjustment of BiPAP settings.    Worsening GERD  Pt endorses that for the past few months her GERD has been very severe. Pt denies any evidence of GI bleeding. She does have to take multiple famotidine's for relieve it  -GI consulted     T2DM on long-term insulin: Follow-up A1c.  Monitor on basal/prandial insulin with SSI.     Suspected diastolic CHF: Doubt acute decompensation  Cardiology consulted 1/16: May have chronic diastolic HF, but echo not suggestive of that. proBNP 773, 184.  Chest x-ray unchanged from prior.  Hold Lasix and Aldactone.     Elevated troponin:  HS- troponin: 74, 65. 32 on 1/14/2025.  EKG with no acute ST-T changes.  TTE 1/14/2025: LVEF 55 to 60%. Unable to assess wall motion.  Cardiology consulted.     Iron deficiency anemia: Received IV iron on recent admission.  Monitor H&H and transfuse prn.   Goal Hgb >8 g/dl.  May need GI workup outpatient.    Code Status: Full Code   FEN: ADULT DIET; Regular; 4 carb choices (60 gm/meal); Low

## 2025-01-21 NOTE — CARE COORDINATION
01/21/25 1547   Readmission Assessment   Number of Days since last admission? 1-7 days   Previous Disposition Home with Family   Who is being Interviewed Patient   What was the patient's/caregiver's perception as to why they think they needed to return back to the hospital? Did not realize care needs would be so extensive   Did you visit your Primary Care Physician after you left the hospital, before you returned this time? No   Why weren't you able to visit your PCP? Other (Comment)  (Not enough time)   Did you see a specialist, such as Cardiac, Pulmonary, Orthopedic Physician, etc. after you left the hospital? No   Who advised the patient to return to the hospital? Other (Comment)  (Family)   Does the patient report anything that got in the way of taking their medications? No   In our efforts to provide the best possible care to you and others like you, can you think of anything that we could have done to help you after you left the hospital the first time, so that you might not have needed to return so soon? Arrange for more help when leaving the hospital;Teaching during hospitalization regarding your illness;Discharge instructions that are concise, clear, and non contradictory     Electronically signed by JULIA Bonds on 1/21/25 at 3:49 PM EST

## 2025-01-21 NOTE — RT PROTOCOL NOTE
RT Inhaler-Nebulizer Bronchodilator Protocol Note    There is a bronchodilator order in the chart from a provider indicating to follow the RT Bronchodilator Protocol and there is an “Initiate RT Inhaler-Nebulizer Bronchodilator Protocol” order as well (see protocol at bottom of note).    CXR Findings:  XR CHEST PORTABLE    Result Date: 1/20/2025  1. Mild cardiac enlargement with vascular prominence. 2. No significant interval change.       The findings from the last RT Protocol Assessment were as follows:   History Pulmonary Disease: Chronic pulmonary disease  Respiratory Pattern: Dyspnea on exertion or RR 21-25 bpm  Breath Sounds: Intermittent or unilateral wheezes  Cough: Weak, productive  Indication for Bronchodilator Therapy:    Bronchodilator Assessment Score: 10    Aerosolized bronchodilator medication orders have been revised according to the RT Inhaler-Nebulizer Bronchodilator Protocol below.    Respiratory Therapist to perform RT Therapy Protocol Assessment initially then follow the protocol.  Repeat RT Therapy Protocol Assessment PRN for score 0-3 or on second treatment, BID, and PRN for scores above 3.    No Indications - adjust the frequency to every 6 hours PRN wheezing or bronchospasm, if no treatments needed after 48 hours then discontinue using Per Protocol order mode.     If indication present, adjust the RT bronchodilator orders based on the Bronchodilator Assessment Score as indicated below.  Use Inhaler orders unless patient has one or more of the following: on home nebulizer, not able to hold breath for 10 seconds, is not alert and oriented, cannot activate and use MDI correctly, or respiratory rate 25 breaths per minute or more, then use the equivalent nebulizer order(s) with same Frequency and PRN reasons based on the score.  If a patient is on this medication at home then do not decrease Frequency below that used at home.    0-3 - enter or revise RT bronchodilator order(s) to equivalent RT

## 2025-01-21 NOTE — CARE COORDINATION
Case Management Assessment  Initial Evaluation    Date/Time of Evaluation: 1/21/2025 3:51 PM  Assessment Completed by: JULIA Bonds    If patient is discharged prior to next notation, then this note serves as note for discharge by case management.    Patient Name: Cassandra Bowser                   YOB: 1975  Diagnosis: Elevated troponin [R79.89]  Dyspnea, unspecified type [R06.00]  Acute on chronic hypoxic respiratory failure [J96.21]                   Date / Time: 1/20/2025  7:12 AM    Patient Admission Status: Inpatient   Readmission Risk (Low < 19, Mod (19-27), High > 27): Readmission Risk Score: 21.5    Current PCP: Cody Solano MD  PCP verified by CM? (P) Yes    Chart Reviewed: Yes      History Provided by: (P) Patient  Patient Orientation: (P) Alert and Oriented    Patient Cognition: (P) Alert    Hospitalization in the last 30 days (Readmission):  Yes    If yes, Readmission Assessment in  Navigator will be completed.    Advance Directives:      Code Status: Full Code   Patient's Primary Decision Maker is: (P) Legal Next of Kin      Discharge Planning:    Patient lives with: (P) Spouse/Significant Other, Children Type of Home: (P) House  Primary Care Giver: (P) Family  Patient Support Systems include: (P) Spouse/Significant Other, Children   Current Financial resources: (P) None  Current community resources: (P) None  Current services prior to admission: (P) Durable Medical Equipment            Current DME: (P) Oxygen Therapy (Comment), Walker (CPAP)            Type of Home Care services:  (P) PT, OT    ADLS  Prior functional level: (P) Assistance with the following:, Mobility  Current functional level: (P) Mobility, Assistance with the following:    PT AM-PAC:   /24  OT AM-PAC:   /24    Family can provide assistance at DC: (P) Yes  Would you like Case Management to discuss the discharge plan with any other family members/significant others, and if so, who? (P) Yes  Plans to Return

## 2025-01-22 LAB
ALBUMIN SERPL-MCNC: 3.4 G/DL (ref 3.4–5)
ALBUMIN/GLOB SERPL: 1.4 {RATIO} (ref 1.1–2.2)
ALP SERPL-CCNC: 110 U/L (ref 40–129)
ALT SERPL-CCNC: 37 U/L (ref 10–40)
ANION GAP SERPL CALCULATED.3IONS-SCNC: 9 MMOL/L (ref 3–16)
AST SERPL-CCNC: 17 U/L (ref 15–37)
BASOPHILS # BLD: 0 K/UL (ref 0–0.2)
BASOPHILS NFR BLD: 0.4 %
BILIRUB SERPL-MCNC: <0.2 MG/DL (ref 0–1)
BUN SERPL-MCNC: 25 MG/DL (ref 7–20)
CALCIUM SERPL-MCNC: 8.9 MG/DL (ref 8.3–10.6)
CHLORIDE SERPL-SCNC: 103 MMOL/L (ref 99–110)
CO2 SERPL-SCNC: 29 MMOL/L (ref 21–32)
CREAT SERPL-MCNC: 0.6 MG/DL (ref 0.6–1.1)
DEPRECATED RDW RBC AUTO: 21 % (ref 12.4–15.4)
EOSINOPHIL # BLD: 0.1 K/UL (ref 0–0.6)
EOSINOPHIL NFR BLD: 0.7 %
GFR SERPLBLD CREATININE-BSD FMLA CKD-EPI: >90 ML/MIN/{1.73_M2}
GLUCOSE BLD-MCNC: 138 MG/DL (ref 70–99)
GLUCOSE BLD-MCNC: 145 MG/DL (ref 70–99)
GLUCOSE BLD-MCNC: 297 MG/DL (ref 70–99)
GLUCOSE BLD-MCNC: 381 MG/DL (ref 70–99)
GLUCOSE SERPL-MCNC: 128 MG/DL (ref 70–99)
HCT VFR BLD AUTO: 25.8 % (ref 36–48)
HGB BLD-MCNC: 7.9 G/DL (ref 12–16)
LYMPHOCYTES # BLD: 1 K/UL (ref 1–5.1)
LYMPHOCYTES NFR BLD: 10 %
MAGNESIUM SERPL-MCNC: 2.38 MG/DL (ref 1.8–2.4)
MCH RBC QN AUTO: 26.2 PG (ref 26–34)
MCHC RBC AUTO-ENTMCNC: 30.8 G/DL (ref 31–36)
MCV RBC AUTO: 85 FL (ref 80–100)
MONOCYTES # BLD: 0.7 K/UL (ref 0–1.3)
MONOCYTES NFR BLD: 7 %
NEUTROPHILS # BLD: 8.1 K/UL (ref 1.7–7.7)
NEUTROPHILS NFR BLD: 81.9 %
PERFORMED ON: ABNORMAL
PLATELET # BLD AUTO: 154 K/UL (ref 135–450)
PMV BLD AUTO: 9.3 FL (ref 5–10.5)
POTASSIUM SERPL-SCNC: 4.5 MMOL/L (ref 3.5–5.1)
PROT SERPL-MCNC: 5.9 G/DL (ref 6.4–8.2)
RBC # BLD AUTO: 3.03 M/UL (ref 4–5.2)
SODIUM SERPL-SCNC: 141 MMOL/L (ref 136–145)
WBC # BLD AUTO: 9.9 K/UL (ref 4–11)

## 2025-01-22 PROCEDURE — 2700000000 HC OXYGEN THERAPY PER DAY

## 2025-01-22 PROCEDURE — 36415 COLL VENOUS BLD VENIPUNCTURE: CPT

## 2025-01-22 PROCEDURE — 80053 COMPREHEN METABOLIC PANEL: CPT

## 2025-01-22 PROCEDURE — 93005 ELECTROCARDIOGRAM TRACING: CPT | Performed by: STUDENT IN AN ORGANIZED HEALTH CARE EDUCATION/TRAINING PROGRAM

## 2025-01-22 PROCEDURE — 94660 CPAP INITIATION&MGMT: CPT

## 2025-01-22 PROCEDURE — 94669 MECHANICAL CHEST WALL OSCILL: CPT

## 2025-01-22 PROCEDURE — 6370000000 HC RX 637 (ALT 250 FOR IP): Performed by: NURSE PRACTITIONER

## 2025-01-22 PROCEDURE — 99232 SBSQ HOSP IP/OBS MODERATE 35: CPT | Performed by: INTERNAL MEDICINE

## 2025-01-22 PROCEDURE — 2060000000 HC ICU INTERMEDIATE R&B

## 2025-01-22 PROCEDURE — 6370000000 HC RX 637 (ALT 250 FOR IP): Performed by: INTERNAL MEDICINE

## 2025-01-22 PROCEDURE — 85025 COMPLETE CBC W/AUTO DIFF WBC: CPT

## 2025-01-22 PROCEDURE — 6360000002 HC RX W HCPCS: Performed by: INTERNAL MEDICINE

## 2025-01-22 PROCEDURE — 83735 ASSAY OF MAGNESIUM: CPT

## 2025-01-22 PROCEDURE — 94640 AIRWAY INHALATION TREATMENT: CPT

## 2025-01-22 PROCEDURE — 2500000003 HC RX 250 WO HCPCS: Performed by: INTERNAL MEDICINE

## 2025-01-22 PROCEDURE — 94761 N-INVAS EAR/PLS OXIMETRY MLT: CPT

## 2025-01-22 PROCEDURE — 6370000000 HC RX 637 (ALT 250 FOR IP): Performed by: PHYSICIAN ASSISTANT

## 2025-01-22 RX ORDER — SUCRALFATE 1 G/1
1 TABLET ORAL
Qty: 120 TABLET | Refills: 3 | Status: SHIPPED | OUTPATIENT
Start: 2025-01-22

## 2025-01-22 RX ORDER — CLOPIDOGREL BISULFATE 75 MG/1
75 TABLET ORAL DAILY
Qty: 30 TABLET | Refills: 0 | Status: SHIPPED | OUTPATIENT
Start: 2025-01-28

## 2025-01-22 RX ORDER — DOXYCYCLINE HYCLATE 100 MG
100 TABLET ORAL EVERY 12 HOURS SCHEDULED
Qty: 6 TABLET | Refills: 0 | Status: SHIPPED | OUTPATIENT
Start: 2025-01-23 | End: 2025-01-26

## 2025-01-22 RX ORDER — DOXYCYCLINE HYCLATE 100 MG
100 TABLET ORAL EVERY 12 HOURS SCHEDULED
Status: DISCONTINUED | OUTPATIENT
Start: 2025-01-23 | End: 2025-01-23 | Stop reason: HOSPADM

## 2025-01-22 RX ORDER — PANTOPRAZOLE SODIUM 40 MG/1
40 TABLET, DELAYED RELEASE ORAL
Qty: 30 TABLET | Refills: 3 | Status: SHIPPED | OUTPATIENT
Start: 2025-01-22

## 2025-01-22 RX ORDER — PREDNISONE 5 MG/1
TABLET ORAL
Qty: 60 TABLET | Refills: 0 | Status: SHIPPED | OUTPATIENT
Start: 2025-01-23 | End: 2025-01-23 | Stop reason: HOSPADM

## 2025-01-22 RX ADMIN — INSULIN LISPRO 5 UNITS: 100 INJECTION, SOLUTION INTRAVENOUS; SUBCUTANEOUS at 13:03

## 2025-01-22 RX ADMIN — GUAIFENESIN 1200 MG: 600 TABLET ORAL at 21:01

## 2025-01-22 RX ADMIN — ASPIRIN 81 MG: 81 TABLET, COATED ORAL at 09:46

## 2025-01-22 RX ADMIN — PANTOPRAZOLE SODIUM 40 MG: 40 TABLET, DELAYED RELEASE ORAL at 06:26

## 2025-01-22 RX ADMIN — IPRATROPIUM BROMIDE AND ALBUTEROL SULFATE 1 DOSE: .5; 3 SOLUTION RESPIRATORY (INHALATION) at 12:55

## 2025-01-22 RX ADMIN — HYDROCODONE BITARTRATE AND ACETAMINOPHEN 1 TABLET: 10; 325 TABLET ORAL at 18:04

## 2025-01-22 RX ADMIN — HYDROCODONE BITARTRATE AND ACETAMINOPHEN 1 TABLET: 10; 325 TABLET ORAL at 13:02

## 2025-01-22 RX ADMIN — INSULIN LISPRO 5 UNITS: 100 INJECTION, SOLUTION INTRAVENOUS; SUBCUTANEOUS at 18:07

## 2025-01-22 RX ADMIN — Medication 10 ML: at 21:02

## 2025-01-22 RX ADMIN — INSULIN LISPRO 4 UNITS: 100 INJECTION, SOLUTION INTRAVENOUS; SUBCUTANEOUS at 18:06

## 2025-01-22 RX ADMIN — PANTOPRAZOLE SODIUM 40 MG: 40 TABLET, DELAYED RELEASE ORAL at 18:04

## 2025-01-22 RX ADMIN — Medication 2 PUFF: at 08:37

## 2025-01-22 RX ADMIN — SUCRALFATE 1 G: 1 TABLET ORAL at 18:04

## 2025-01-22 RX ADMIN — PREDNISONE 20 MG: 20 TABLET ORAL at 09:46

## 2025-01-22 RX ADMIN — HYDROCODONE BITARTRATE AND ACETAMINOPHEN 1 TABLET: 10; 325 TABLET ORAL at 06:26

## 2025-01-22 RX ADMIN — INSULIN LISPRO 8 UNITS: 100 INJECTION, SOLUTION INTRAVENOUS; SUBCUTANEOUS at 21:01

## 2025-01-22 RX ADMIN — Medication 10 ML: at 09:47

## 2025-01-22 RX ADMIN — SUCRALFATE 1 G: 1 TABLET ORAL at 09:46

## 2025-01-22 RX ADMIN — Medication 2 PUFF: at 22:11

## 2025-01-22 RX ADMIN — IPRATROPIUM BROMIDE AND ALBUTEROL SULFATE 1 DOSE: .5; 3 SOLUTION RESPIRATORY (INHALATION) at 08:37

## 2025-01-22 RX ADMIN — GUAIFENESIN 1200 MG: 600 TABLET ORAL at 09:46

## 2025-01-22 RX ADMIN — ALUMINUM HYDROXIDE, MAGNESIUM HYDROXIDE, AND SIMETHICONE 30 ML: 200; 200; 20 SUSPENSION ORAL at 21:08

## 2025-01-22 RX ADMIN — ONDANSETRON 4 MG: 2 INJECTION, SOLUTION INTRAMUSCULAR; INTRAVENOUS at 00:12

## 2025-01-22 RX ADMIN — INSULIN GLARGINE 20 UNITS: 100 INJECTION, SOLUTION SUBCUTANEOUS at 09:46

## 2025-01-22 RX ADMIN — SUCRALFATE 1 G: 1 TABLET ORAL at 06:26

## 2025-01-22 RX ADMIN — IPRATROPIUM BROMIDE AND ALBUTEROL SULFATE 1 DOSE: .5; 3 SOLUTION RESPIRATORY (INHALATION) at 22:11

## 2025-01-22 RX ADMIN — IPRATROPIUM BROMIDE AND ALBUTEROL SULFATE 1 DOSE: .5; 3 SOLUTION RESPIRATORY (INHALATION) at 16:52

## 2025-01-22 RX ADMIN — INSULIN LISPRO 5 UNITS: 100 INJECTION, SOLUTION INTRAVENOUS; SUBCUTANEOUS at 09:46

## 2025-01-22 ASSESSMENT — PAIN SCALES - GENERAL
PAINLEVEL_OUTOF10: 3
PAINLEVEL_OUTOF10: 6
PAINLEVEL_OUTOF10: 8
PAINLEVEL_OUTOF10: 6
PAINLEVEL_OUTOF10: 7

## 2025-01-22 ASSESSMENT — PAIN DESCRIPTION - LOCATION
LOCATION: GENERALIZED
LOCATION: GENERALIZED
LOCATION: BACK;HIP;LEG

## 2025-01-22 ASSESSMENT — PAIN DESCRIPTION - PAIN TYPE: TYPE: CHRONIC PAIN

## 2025-01-22 ASSESSMENT — PAIN DESCRIPTION - DESCRIPTORS
DESCRIPTORS: ACHING

## 2025-01-22 ASSESSMENT — PAIN DESCRIPTION - ORIENTATION: ORIENTATION: RIGHT;LEFT;LOWER

## 2025-01-22 NOTE — DISCHARGE INSTRUCTIONS
Please call and make an appointment with your PCP within 1 week; If you do not have a PCP please make an appointment with the [facility] outpatient resident clinic by calling [phone]  Please call and make an appointment with Pulmonology, GI  Please hold your plavix for the next 5 days and see GI so that they can do your endoscopy and possible colonoscopy to investigate why you still have anemia  Please take all your medications as prescribed including any new ones on discharge

## 2025-01-22 NOTE — PROGRESS NOTES
[facility] Daily Progress Note      Admit Date:  1/20/2025      Cardiology consult: Chest pain    Subjective:  Ms. Bowser continues to have complaints of shortness of breath and fatigue.  She is wondering whether her anemia is contributing.  She also had an episode of chest discomfort.    Relevant available office/hospital notes, medications, labs, imaging and cardiovascular diagnostics were reviewed.     History of present illness:   Cassandra Bowser has a  PMH of CAD, COPD, diabetes mellitus, hypertension, dyslipidemia, narcolepsy, obesity who presented to the hospital with complaints of worsening shortness of breath.  She had recently been hospitalized from 1/13/2025 to 1/18/2025.  She was discharged and then presented to 1/20/2025.  She describes intermittent chest discomfort.  She has also had hypoxia and O2 saturations in had also developed hypotension.  She follows with CHF service, Dr. Weaver.       Objective:   BP (!) 156/70   Pulse 83   Temp 98.2 °F (36.8 °C) (Oral)   Resp 16   Wt 114.2 kg (251 lb 12.8 oz)   LMP 10/15/2012   SpO2 98%   BMI 47.58 kg/m²     Intake/Output Summary (Last 24 hours) at 1/22/2025 1858  Last data filed at 1/22/2025 0641  Gross per 24 hour   Intake 800 ml   Output 1200 ml   Net -400 ml       Physical Exam:  General:  Awake, alert, oriented x 3, NAD  Skin:  Warm and dry  Neck:  JVD is difficult to assess  Chest:  decreased air exchange bibasilar  Cardiovascular:  RRR S1S2, no S3, 2/6 systolic at lower left sternal border  Abdomen:  Soft, ND, NT, No HSM  Extremities:  Trace edema    Medications:    [START ON 1/23/2025] doxycycline hyclate  100 mg Oral 2 times per day    predniSONE  20 mg Oral Daily    pantoprazole  40 mg Oral BID AC    cefTRIAXone (ROCEPHIN) IV  1,000 mg IntraVENous Q24H    sodium chloride flush  5-40 mL IntraVENous 2 times per day    enoxaparin  40 mg SubCUTAneous Daily    insulin lispro  0-8 Units SubCUTAneous 4x Daily AC & HS    [Held by

## 2025-01-22 NOTE — PROGRESS NOTES
Admit Date: 1/20/2025  Diet: ADULT DIET; Regular; 4 carb choices (60 gm/meal); Low Sodium (2 gm)    CC: respiratory failure    Interval history:   Overnight, there were no acute events. Patient's vitals remained stable    Patient was seen this morning. I discussed the plan of the day with her in detail. All questions were addressed and answered to patient's satisfaction.   Patient denies fevers, chills, nausea, vomiting, chest pain, shortness of breath, diarrhea, constipation, dysuria, urinary frequency or urgency.     Plan:     -Per Pulm, keep on home BiPAP tonight and repeat ABG tomorrow prior to discharge    Assessment:   Cassandra Bowser is a 49 y.o. female with PMH of COPD on 3L NC who was admitted with Acute on chronic respiratory failure    Acute on chronic hypoxic respiratory failure with hypercapnia:  ABG reviewed. Currently requiring 3L/min.  Nightly BiPAP ordered.  Pulm consulted for evaluation and possible adjustment of BiPAP settings.    Worsening GERD  Pt endorses that for the past few months her GERD has been very severe. Pt denies any evidence of GI bleeding. She does have to take multiple famotidine's for relieve it  -GI consulted     T2DM on long-term insulin: Follow-up A1c.  Monitor on basal/prandial insulin with SSI.     Suspected diastolic CHF: Doubt acute decompensation  Cardiology consulted 1/16: May have chronic diastolic HF, but echo not suggestive of that. proBNP 773, 184.  Chest x-ray unchanged from prior.  Hold Lasix and Aldactone.     Elevated troponin:  HS- troponin: 74, 65. 32 on 1/14/2025.  EKG with no acute ST-T changes.  TTE 1/14/2025: LVEF 55 to 60%. Unable to assess wall motion.  Cardiology consulted.     Iron deficiency anemia: Received IV iron on recent admission.  Monitor H&H and transfuse prn.   Goal Hgb >8 g/dl.  May need GI workup outpatient.    Code Status: Full Code   FEN: ADULT DIET; Regular; 4 carb choices (60 gm/meal); Low Sodium (2 gm)   DVT PPX: [x]Lovenox,  hours.  BNP: No results for input(s): \"BNP\" in the last 72 hours.  Lipids:   Recent Labs     01/21/25  0521   CHOL 166   HDL 49     ABGs:    Recent Labs     01/20/25  1250 01/21/25  0720   PHART 7.410 7.406   BYI2TVO 55.9* 51.4*   PO2ART 75.2 133.0*   HIM1CBM 35.4* 32.3*   BEART 9.6* 6.7*   H7JJFBEN 96.1 100.0   DPU7SUB 83.1 75.8       INR: No results for input(s): \"INR\" in the last 72 hours.  Lactate: No results for input(s): \"LACTATE\" in the last 72 hours.  Cultures:  -----------------------------------------------------------------  RAD:   XR CHEST PORTABLE   Final Result   1. Mild cardiac enlargement with vascular prominence.   2. No significant interval change.             Medications:     Scheduled Meds:   [START ON 1/23/2025] doxycycline hyclate  100 mg Oral 2 times per day    predniSONE  20 mg Oral Daily    pantoprazole  40 mg Oral BID AC    cefTRIAXone (ROCEPHIN) IV  1,000 mg IntraVENous Q24H    sodium chloride flush  5-40 mL IntraVENous 2 times per day    enoxaparin  40 mg SubCUTAneous Daily    insulin lispro  0-8 Units SubCUTAneous 4x Daily AC & HS    [Held by provider] clopidogrel  75 mg Oral Daily    budesonide-formoterol  2 puff Inhalation BID RT    ipratropium 0.5 mg-albuterol 2.5 mg  1 Dose Inhalation Q4H WA RT    aspirin  81 mg Oral Daily    sucralfate  1 g Oral TID AC    guaiFENesin  1,200 mg Oral BID    nicotine  1 patch TransDERmal Daily    insulin glargine  20 Units SubCUTAneous QAM    insulin lispro  5 Units SubCUTAneous TID WC     Continuous Infusions:   sodium chloride      dextrose       PRN Meds:sodium chloride flush, sodium chloride, potassium chloride **OR** potassium alternative oral replacement **OR** potassium chloride, magnesium sulfate, ondansetron **OR** ondansetron, polyethylene glycol, acetaminophen **OR** acetaminophen, dextrose bolus **OR** dextrose bolus, glucagon (rDNA), dextrose, HYDROcodone-acetaminophen, meclizine, aluminum & magnesium hydroxide-simethicone

## 2025-01-22 NOTE — PROGRESS NOTES
Physician Progress Note      PATIENT:               SERVANDO HARRIS  CSN #:                  [id number]  :                       1975  ADMIT DATE:       2025 7:12 AM  DISCH DATE:  RESPONDING  PROVIDER #:        CLAUS BROWN MD          QUERY TEXT:    Patient admitted with hypoxia. Noted documentation of Acute on chronic hypoxic   respiratory failure with hypercapnia in attendings pn on 25 and Chronic   hypoxic/hypercapnic respiratory failure in Pulmonary consult on 25. If   possible, please document in progress notes and discharge summary if you are   evaluating and /or treating any of the following:    The medical record reflects the following:  Risk Factors: Hx- tobacco abuse, COPD with chronic hypoxic respiratory failure   on 2 L/min, CAD, obesity, T2 DM, HTN, morbid obesity, recently admitted here   for acute on chronic hypoxic/hypercapnic respiratory failure, CHF, CKD  Clinical Indicators: VS- 98.2, 94, 23, 142/119 - 70/38 - 138/81    99%   3L......CO2 33, VBG- pco2 58.9...Per attending pn on 25-Acute on chronic   hypoxic respiratory failure with hypercapnia....Per Pulmonary consult on   25- Chronic hypoxic/hypercapnic respiratory failure.  pCO2 58, well   compensated.  O2 requirement at 3 L....per ED provider note on 25- No   accessory muscle usage, faint wheeze  Treatment: Oxygen, Duoneb, Pulmonary consult, Bipap  Options provided:  -- Acute on chronic hypoxic respiratory failure with hypercapnia confirmed and   only Chronic hypoxic/hypercapnic respiratory failure ruled out  -- Only Chronic hypoxic/hypercapnic respiratory failure confirmed and Acute on   chronic hypoxic respiratory failure with hypercapnia ruled out  -- Other - I will add my own diagnosis  -- Disagree - Not applicable / Not valid  -- Disagree - Clinically unable to determine / Unknown  -- Refer to Clinical Documentation Reviewer    PROVIDER RESPONSE TEXT:    After study, Acute on chronic hypoxic respiratory

## 2025-01-22 NOTE — PROGRESS NOTES
Gastroenterology Progress Note    Cassandra Bowser is a 49 y.o. female patient.  1. Dyspnea, unspecified type    2. Elevated troponin        Admission Date: 2025  Hospital Day: Hospital Day: 3  Attending: Vinnie Cardona MD  Date of service: 25    SUBJECTIVE:    Still having intermittent reflux and SOB     ROS:  Cardiovascular ROS: no chest pain or dyspnea on exertion  Gastrointestinal ROS: see above  Respiratory ROS: no cough, shortness of breath, or wheezing    Physical    VITALS:  BP (!) 150/73   Pulse 84   Temp 97.8 °F (36.6 °C) (Oral)   Resp 18   Wt 114.2 kg (251 lb 12.8 oz)   LMP 10/15/2012   SpO2 98%   BMI 47.58 kg/m²   TEMPERATURE:  Current - Temp: 97.8 °F (36.6 °C); Max - Temp  Av.3 °F (36.8 °C)  Min: 97.8 °F (36.6 °C)  Max: 98.7 °F (37.1 °C)    NAD  RRR, Nl s1s2  Lungs CTA Bilaterally, normal effort  Abdomen soft, ND, NT, no HSM, Bowel sounds normal  AAOx3, No asterixis     Data      CBC:   Recent Labs     25  0825  0525  0524   WBC 17.7* 13.2* 9.9   RBC 3.47* 3.24* 3.03*   HGB 8.7* 8.4* 7.9*   HCT 29.1* 27.7* 25.8*    139 154   MCV 83.8 85.4 85.0   MCH 25.0* 25.8* 26.2   MCHC 29.8* 30.2* 30.8*   RDW 19.6* 19.7* 21.0*        BMP:  Recent Labs     25  0825  0525  0524    137 141   K 4.9 5.4* 4.5    101 103   CO2 33* 27 29   BUN 38* 28* 25*   CREATININE 1.0 0.7 0.6   CALCIUM 8.7 8.8 8.9   GLUCOSE 125* 249* 128*        Hepatic Function Panel:   Recent Labs     25  0825  0524   AST 28 17   ALT 46* 37   BILITOT 0.3 <0.2   ALKPHOS 111 110       No results for input(s): \"LIPASE\", \"AMYLASE\" in the last 72 hours.  No results for input(s): \"PROTIME\", \"INR\" in the last 72 hours.  Invalid input(s): \"PTT\"  No results for input(s): \"OCCULTBLD\" in the last 72 hours.      Radiology Review:    XR CHEST PORTABLE   Final Result   1. Mild cardiac enlargement with vascular prominence.   2. No significant interval change.

## 2025-01-22 NOTE — PROGRESS NOTES
01/22/25 0021   NIV Type   $NIV $Daily Charge   Ventilator ID [id number]   NIV Started/Stopped On   Equipment Type V60   Mode Bilevel   Mask Type Full face mask   Mask Size Large   Assessment   Pulse 77   Respirations 29   SpO2 99 %   Settings/Measurements   PIP Observed 11 cm H20   IPAP 10 cmH20   CPAP/EPAP 5 cmH2O   Vt (Measured) 311 mL   Rate Ordered 10   FiO2  30 %   I Time/ I Time % 1 s   Mask Leak (lpm) 3 lpm   Patient's Home Machine No   Alarm Settings   Alarms On Y   Low Pressure (cmH2O) 3 cmH2O   High Pressure (cmH2O) 30 cmH2O   RR Low (bpm) 11   RR High (bpm) 40 br/min   Oxygen Therapy/Pulse Ox   O2 Therapy Oxygen   O2 Device PAP (positive airway pressure)

## 2025-01-22 NOTE — DISCHARGE INSTR - COC
Continuity of Care Form    Patient Name: Cassandra Bowser   :  1975  MRN:  [id number]    Admit date:  2025  Discharge date:  ***    Code Status Order: Full Code   Advance Directives:   Advance Care Flowsheet Documentation             Admitting Physician:  Heidi Boogie MD  PCP: Cody Solano MD    Discharging Nurse: ***  Discharging Hospital Unit/Room#: [id number]-[id number]/3380-01  Discharging Unit Phone Number: ***    Emergency Contact:   Extended Emergency Contact Information  Primary Emergency Contact: Attila Bowser  Address: [address] [place] [address] of [place]  Home Phone: [phone]  Mobile Phone: [phone]  Relation: Spouse    Past Surgical History:  Past Surgical History:   Procedure Laterality Date    CARDIAC CATHETERIZATION  2010, 2011    Dr. Wahl     SECTION      CHOLECYSTECTOMY, LAPAROSCOPIC  10/5/2011    with intraoperative cholangiogram    EYE SURGERY      age 1    HYSTERECTOMY (CERVIX STATUS UNKNOWN)      TUBAL LIGATION         Immunization History:   Immunization History   Administered Date(s) Administered    Influenza Virus Vaccine 2006, 2010, 2011    Pneumococcal, PPSV23, PNEUMOVAX 23, (age 2y+), SC/IM, 0.5mL 2010    Rubella 2006       Active Problems:  Patient Active Problem List   Diagnosis Code    Chest pain R07.9    Smoker F17.200    Anxiety F41.9    Essential hypertension I10    COPD with acute exacerbation (HCC) J44.1    Hypercholesteremia E78.00    Coronary artery disease due to lipid rich plaque I25.10, I25.83    Chronic kidney disease N18.9    Acute pancreatitis K85.90    Abnormal LFTs R79.89    Gallstones and inflammation of gallbladder with obstruction K80.19    Abscess of breast N61.1    Anemia D64.9    Pre-operative cardiovascular examination Z01.810    Iron deficiency anemia D50.9    Unstable angina (HCC) I20.0    Fibroids D21.9    Hoarseness R49.0    Menorrhagia N92.0    Morbid obesity  Conduit: {YES / NO:}       Date of Last BM: ***    Intake/Output Summary (Last 24 hours) at 2025 1359  Last data filed at 2025 0641  Gross per 24 hour   Intake 800 ml   Output 1200 ml   Net -400 ml     I/O last 3 completed shifts:  In: 800 [P.O.:800]  Out: 2425 [Urine:2425]    Safety Concerns:     { [facility] Safety Concerns:550973105}    Impairments/Disabilities:      { CHRISTIANA Impairments/Disabilities:418632427}    Nutrition Therapy:  Current Nutrition Therapy:   { [facility] Diet List:325085882}    Routes of Feeding: {[facility] Other Feedings:184076612}  Liquids: {Slp liquid thickness:75471}  Daily Fluid Restriction: {[facility] DME Yes amt example:152471941}  Last Modified Barium Swallow with Video (Video Swallowing Test): {Done Not Done Date:}    Treatments at the Time of Hospital Discharge:   Respiratory Treatments: ***  Oxygen Therapy:  {Therapy; copd oxygen:42782}  Ventilator:    {[facility] Vent List:095673825}    Rehab Therapies: {THERAPEUTIC INTERVENTION:2512777447}  Weight Bearing Status/Restrictions: {[facility] Weight Bearin}  Other Medical Equipment (for information only, NOT a DME order):  {EQUIPMENT:807918122}  Other Treatments: ***    Patient's personal belongings (please select all that are sent with patient):  {[facility] DME Belongings:065589637}    RN SIGNATURE:  {Esignature:680059521}    CASE MANAGEMENT/SOCIAL WORK SECTION    Inpatient Status Date: ***    Readmission Risk Assessment Score:  [facility] RISK OF UNPLANNED READMISSION 2.0             21.8 Total Score        Discharging to Facility/ Agency   Name:   Address:  Phone:  Fax:    Dialysis Facility (if applicable)   Name:  Address:  Dialysis Schedule:  Phone:  Fax:    / signature: {Esignature:466972051}    PHYSICIAN SECTION    Prognosis: {Prognosis:6590746664}    Condition at Discharge: { Patient Condition:086016421}    Rehab Potential (if transferring to Rehab): {Prognosis:2764050015}    Recommended Labs or Other

## 2025-01-22 NOTE — PROGRESS NOTES
01/22/25 0411   NIV Type   Ventilator ID [id number]   NIV Started/Stopped On   Equipment Type V60   Mode Bilevel   Mask Type Full face mask   Mask Size Large   Assessment   Pulse 72   Respirations 13   SpO2 99 %   Settings/Measurements   PIP Observed 11 cm H20   IPAP 10 cmH20   CPAP/EPAP 5 cmH2O   Vt (Measured) 590 mL   Rate Ordered 10   FiO2  30 %   I Time/ I Time % 1 s   Patient's Home Machine No   Alarm Settings   Alarms On Y   Low Pressure (cmH2O) 3 cmH2O   High Pressure (cmH2O) 30 cmH2O   RR Low (bpm) 11   RR High (bpm) 40 br/min

## 2025-01-22 NOTE — PROGRESS NOTES
Unable to tolerate home cpap device at the bedside. Discussed with RT. Agreed to wear hospital supplied bipap. Continuous pulse ox intact. Abnormal UA noted. Orders received for Rocephin. Multiple allergies noted with monitor for any symptoms. Resting well pulse ox 99% on bipap at this time.  remains at the bedside.

## 2025-01-22 NOTE — PROGRESS NOTES
[facility]/CCM Progress note      Admit Date: 1/20/2025    Chief Complaint: Shortness of breath and altered mental status    Subjective:     Interval History: Apparently the home AVAPS machine was malfunctioning early due to significant mask leak per report.  [employer] rep has been informed, will need advised on troubleshooting.  Otherwise no new issues.  Currently awake and alert.  No significant shortness of breath or wheeze.    Scheduled Meds:   [START ON 1/23/2025] doxycycline hyclate  100 mg Oral 2 times per day    predniSONE  20 mg Oral Daily    pantoprazole  40 mg Oral BID AC    cefTRIAXone (ROCEPHIN) IV  1,000 mg IntraVENous Q24H    sodium chloride flush  5-40 mL IntraVENous 2 times per day    enoxaparin  40 mg SubCUTAneous Daily    insulin lispro  0-8 Units SubCUTAneous 4x Daily AC & HS    [Held by provider] clopidogrel  75 mg Oral Daily    budesonide-formoterol  2 puff Inhalation BID RT    ipratropium 0.5 mg-albuterol 2.5 mg  1 Dose Inhalation Q4H WA RT    aspirin  81 mg Oral Daily    sucralfate  1 g Oral TID AC    guaiFENesin  1,200 mg Oral BID    nicotine  1 patch TransDERmal Daily    insulin glargine  20 Units SubCUTAneous QAM    insulin lispro  5 Units SubCUTAneous TID WC     Continuous Infusions:   sodium chloride      dextrose       PRN Meds:sodium chloride flush, sodium chloride, potassium chloride **OR** potassium alternative oral replacement **OR** potassium chloride, magnesium sulfate, ondansetron **OR** ondansetron, polyethylene glycol, acetaminophen **OR** acetaminophen, dextrose bolus **OR** dextrose bolus, glucagon (rDNA), dextrose, HYDROcodone-acetaminophen, meclizine, aluminum & magnesium hydroxide-simethicone    Review of Systems  Constitutional: Fatigue  Ears, nose, mouth, throat: negative for ear drainage, epistaxis, hoarseness, nasal congestion, sore throat and voice change  Respiratory: negative except for shortness of breath  Cardiovascular: negative for chest pain, chest

## 2025-01-23 VITALS
DIASTOLIC BLOOD PRESSURE: 59 MMHG | HEART RATE: 85 BPM | RESPIRATION RATE: 16 BRPM | SYSTOLIC BLOOD PRESSURE: 122 MMHG | OXYGEN SATURATION: 94 % | BODY MASS INDEX: 47.35 KG/M2 | WEIGHT: 250.6 LBS | TEMPERATURE: 97.7 F

## 2025-01-23 LAB
ALBUMIN SERPL-MCNC: 3.6 G/DL (ref 3.4–5)
ALBUMIN/GLOB SERPL: 1.6 {RATIO} (ref 1.1–2.2)
ALP SERPL-CCNC: 106 U/L (ref 40–129)
ALT SERPL-CCNC: 39 U/L (ref 10–40)
ANION GAP SERPL CALCULATED.3IONS-SCNC: 9 MMOL/L (ref 3–16)
AST SERPL-CCNC: 19 U/L (ref 15–37)
BACTERIA UR CULT: ABNORMAL
BASE EXCESS BLDA CALC-SCNC: 6.4 MMOL/L (ref -3–3)
BASOPHILS # BLD: 0 K/UL (ref 0–0.2)
BASOPHILS NFR BLD: 0.1 %
BILIRUB SERPL-MCNC: <0.2 MG/DL (ref 0–1)
BUN SERPL-MCNC: 20 MG/DL (ref 7–20)
CALCIUM SERPL-MCNC: 9 MG/DL (ref 8.3–10.6)
CHLORIDE SERPL-SCNC: 104 MMOL/L (ref 99–110)
CO2 BLDA-SCNC: 72.9 MMOL/L
CO2 SERPL-SCNC: 30 MMOL/L (ref 21–32)
COHGB MFR BLDA: 1.8 % (ref 0–1.5)
CREAT SERPL-MCNC: 0.6 MG/DL (ref 0.6–1.1)
DEPRECATED RDW RBC AUTO: 21.6 % (ref 12.4–15.4)
EKG ATRIAL RATE: 83 BPM
EKG DIAGNOSIS: NORMAL
EKG P AXIS: 60 DEGREES
EKG P-R INTERVAL: 124 MS
EKG Q-T INTERVAL: 362 MS
EKG QRS DURATION: 82 MS
EKG QTC CALCULATION (BAZETT): 425 MS
EKG R AXIS: 63 DEGREES
EKG T AXIS: 69 DEGREES
EKG VENTRICULAR RATE: 83 BPM
EOSINOPHIL # BLD: 0.1 K/UL (ref 0–0.6)
EOSINOPHIL NFR BLD: 0.6 %
GFR SERPLBLD CREATININE-BSD FMLA CKD-EPI: >90 ML/MIN/{1.73_M2}
GLUCOSE BLD-MCNC: 129 MG/DL (ref 70–99)
GLUCOSE BLD-MCNC: 167 MG/DL (ref 70–99)
GLUCOSE SERPL-MCNC: 117 MG/DL (ref 70–99)
HCO3 BLDA-SCNC: 31.1 MMOL/L (ref 21–29)
HCT VFR BLD AUTO: 25.7 % (ref 36–48)
HGB BLD-MCNC: 7.7 G/DL (ref 12–16)
HGB BLDA-MCNC: 7.9 G/DL (ref 12–16)
LYMPHOCYTES # BLD: 1.9 K/UL (ref 1–5.1)
LYMPHOCYTES NFR BLD: 15.4 %
MAGNESIUM SERPL-MCNC: 2.24 MG/DL (ref 1.8–2.4)
MCH RBC QN AUTO: 25.7 PG (ref 26–34)
MCHC RBC AUTO-ENTMCNC: 30.1 G/DL (ref 31–36)
MCV RBC AUTO: 85.2 FL (ref 80–100)
METHGB MFR BLDA: 0.5 %
MONOCYTES # BLD: 0.7 K/UL (ref 0–1.3)
MONOCYTES NFR BLD: 6.1 %
NEUTROPHILS # BLD: 9.5 K/UL (ref 1.7–7.7)
NEUTROPHILS NFR BLD: 77.8 %
O2 THERAPY: ABNORMAL
ORGANISM: ABNORMAL
PCO2 BLDA: 45.4 MMHG (ref 35–45)
PERFORMED ON: ABNORMAL
PERFORMED ON: ABNORMAL
PH BLDA: 7.45 [PH] (ref 7.35–7.45)
PLATELET # BLD AUTO: 159 K/UL (ref 135–450)
PLATELET BLD QL SMEAR: ADEQUATE
PMV BLD AUTO: 9.2 FL (ref 5–10.5)
PO2 BLDA: 143 MMHG (ref 75–108)
POTASSIUM SERPL-SCNC: 4.1 MMOL/L (ref 3.5–5.1)
PROT SERPL-MCNC: 5.8 G/DL (ref 6.4–8.2)
RBC # BLD AUTO: 3.01 M/UL (ref 4–5.2)
SAO2 % BLDA: 100 %
SLIDE REVIEW: ABNORMAL
SODIUM SERPL-SCNC: 143 MMOL/L (ref 136–145)
WBC # BLD AUTO: 12.3 K/UL (ref 4–11)

## 2025-01-23 PROCEDURE — 94640 AIRWAY INHALATION TREATMENT: CPT

## 2025-01-23 PROCEDURE — 82803 BLOOD GASES ANY COMBINATION: CPT

## 2025-01-23 PROCEDURE — 6370000000 HC RX 637 (ALT 250 FOR IP): Performed by: STUDENT IN AN ORGANIZED HEALTH CARE EDUCATION/TRAINING PROGRAM

## 2025-01-23 PROCEDURE — 36415 COLL VENOUS BLD VENIPUNCTURE: CPT

## 2025-01-23 PROCEDURE — 85025 COMPLETE CBC W/AUTO DIFF WBC: CPT

## 2025-01-23 PROCEDURE — 94761 N-INVAS EAR/PLS OXIMETRY MLT: CPT

## 2025-01-23 PROCEDURE — 2500000003 HC RX 250 WO HCPCS: Performed by: INTERNAL MEDICINE

## 2025-01-23 PROCEDURE — 93010 ELECTROCARDIOGRAM REPORT: CPT | Performed by: INTERNAL MEDICINE

## 2025-01-23 PROCEDURE — 6370000000 HC RX 637 (ALT 250 FOR IP): Performed by: INTERNAL MEDICINE

## 2025-01-23 PROCEDURE — 2500000003 HC RX 250 WO HCPCS: Performed by: NURSE PRACTITIONER

## 2025-01-23 PROCEDURE — 94669 MECHANICAL CHEST WALL OSCILL: CPT

## 2025-01-23 PROCEDURE — 2700000000 HC OXYGEN THERAPY PER DAY

## 2025-01-23 PROCEDURE — 6370000000 HC RX 637 (ALT 250 FOR IP): Performed by: PHYSICIAN ASSISTANT

## 2025-01-23 PROCEDURE — 6360000002 HC RX W HCPCS: Performed by: NURSE PRACTITIONER

## 2025-01-23 PROCEDURE — 83735 ASSAY OF MAGNESIUM: CPT

## 2025-01-23 PROCEDURE — 80053 COMPREHEN METABOLIC PANEL: CPT

## 2025-01-23 PROCEDURE — 94660 CPAP INITIATION&MGMT: CPT

## 2025-01-23 PROCEDURE — 36600 WITHDRAWAL OF ARTERIAL BLOOD: CPT

## 2025-01-23 RX ORDER — PREDNISONE 20 MG/1
20 TABLET ORAL DAILY
Qty: 10 TABLET | Refills: 0 | Status: SHIPPED | OUTPATIENT
Start: 2025-01-24 | End: 2025-02-03

## 2025-01-23 RX ADMIN — PREDNISONE 20 MG: 20 TABLET ORAL at 10:04

## 2025-01-23 RX ADMIN — INSULIN LISPRO 5 UNITS: 100 INJECTION, SOLUTION INTRAVENOUS; SUBCUTANEOUS at 12:13

## 2025-01-23 RX ADMIN — Medication 10 ML: at 12:20

## 2025-01-23 RX ADMIN — GUAIFENESIN 1200 MG: 600 TABLET ORAL at 10:03

## 2025-01-23 RX ADMIN — IPRATROPIUM BROMIDE AND ALBUTEROL SULFATE 1 DOSE: .5; 3 SOLUTION RESPIRATORY (INHALATION) at 08:22

## 2025-01-23 RX ADMIN — INSULIN GLARGINE 20 UNITS: 100 INJECTION, SOLUTION SUBCUTANEOUS at 10:04

## 2025-01-23 RX ADMIN — SUCRALFATE 1 G: 1 TABLET ORAL at 06:37

## 2025-01-23 RX ADMIN — ASPIRIN 81 MG: 81 TABLET, COATED ORAL at 10:03

## 2025-01-23 RX ADMIN — Medication 2 PUFF: at 08:22

## 2025-01-23 RX ADMIN — WATER 1000 MG: 1 INJECTION INTRAMUSCULAR; INTRAVENOUS; SUBCUTANEOUS at 00:20

## 2025-01-23 RX ADMIN — SUCRALFATE 1 G: 1 TABLET ORAL at 12:13

## 2025-01-23 RX ADMIN — DOXYCYCLINE HYCLATE 100 MG: 100 TABLET, COATED ORAL at 10:04

## 2025-01-23 RX ADMIN — PANTOPRAZOLE SODIUM 40 MG: 40 TABLET, DELAYED RELEASE ORAL at 06:37

## 2025-01-23 RX ADMIN — HYDROCODONE BITARTRATE AND ACETAMINOPHEN 1 TABLET: 10; 325 TABLET ORAL at 06:37

## 2025-01-23 RX ADMIN — IPRATROPIUM BROMIDE AND ALBUTEROL SULFATE 1 DOSE: .5; 3 SOLUTION RESPIRATORY (INHALATION) at 11:38

## 2025-01-23 RX ADMIN — HYDROCODONE BITARTRATE AND ACETAMINOPHEN 1 TABLET: 10; 325 TABLET ORAL at 00:20

## 2025-01-23 ASSESSMENT — PAIN SCALES - GENERAL
PAINLEVEL_OUTOF10: 9
PAINLEVEL_OUTOF10: 9

## 2025-01-23 ASSESSMENT — PAIN DESCRIPTION - LOCATION
LOCATION: GENERALIZED
LOCATION: GENERALIZED

## 2025-01-23 NOTE — PROGRESS NOTES
CLINICAL PHARMACY NOTE: MEDS TO BEDS    Total # of Prescriptions Filled: 4   The following medications were delivered to the patient:  PANTOPRAZOLE SODIUM 40MG TBEC  PREDNISONE 5MG TABS  SUCRALFATE 1GM TABS  DOXYCYCLINE HYCLATE 100MG TABS    Additional Documentation: Sherry HUFF approved to deliver medications to patient room=signed  Plavix due 02/10  [facility] Pharmacy Tech

## 2025-01-23 NOTE — PROGRESS NOTES
CLINICAL PHARMACY NOTE: MEDS TO BEDS    Total # of Prescriptions Filled: 1   The following medications were delivered to the patient:  PREDNISONE 20MG TABS    Additional Documentation: Sherry HUFF picked up=signed  Prednisone 5mg cancelled  [facility] Pharmacy Tech

## 2025-01-23 NOTE — PROGRESS NOTES
Removed telemetry and PIV. Patient given discharge medications per [facility]. Given patient discharge instructions and clarified home prednisone dose. Dr. Cardona states for patient to remain on Prednisone 20 mg oral daily. Patient also had home CPAP machine reviewed and evaluated by [facility] O2 technician to verify settings. Settings correct and entered. Patient and patient's  verbalize understanding of discharge instructions and medications. Provided 25 minutes of education to patient. Denies further needs. Has home O2 tank for discharge.  Patient discharged to home in wheelchair with  via car. Sherry Alfaro RN BSN

## 2025-01-23 NOTE — DISCHARGE SUMMARY
V2.0  Discharge Summary    Name:  Cassandra Bowser /Age/Sex: 1975 (49 y.o. female)   Admit Date: 2025  Discharging Provider: Vinnie Cardona MD   MRN & CSN:  [id number] & [id number] Attending:  Vinnie Cardona MD       Brief HPI: Cassanrda Bowser is a 49 y.o. female with PMHx of COPD on 3L NC who was admitted with Acute on chronic respiratory failure.  Patient was seen by pulmonology and was treated with steroids, DuoNebs and required BiPAP.  Prior to patient's discharge, she underwent an nocturnal pulse ox and qualified for BiPAP.  Patient during admission was also found to be anemic and will follow-up with GI in the outpatient setting for EGD and colonoscopy.    Brief Problem Focused Hospital Course:     Acute on chronic hypoxic respiratory failure with hypercapnia:  ABG reviewed. Currently requiring 3L/min.  Nightly BiPAP ordered.  Pulm consulted for evaluation and possible adjustment of BiPAP settings.     Worsening GERD  Pt endorses that for the past few months her GERD has been very severe. Pt denies any evidence of GI bleeding. She does have to take multiple famotidine's for relieve it  -GI consulted     T2DM on long-term insulin: Follow-up A1c.  Monitor on basal/prandial insulin with SSI.     Suspected diastolic CHF: Doubt acute decompensation  Cardiology consulted : May have chronic diastolic HF, but echo not suggestive of that. proBNP 773, 184.  Chest x-ray unchanged from prior.  Hold Lasix and Aldactone.     Elevated troponin:  HS- troponin: 74, 65. 32 on 2025.  EKG with no acute ST-T changes.  TTE 2025: LVEF 55 to 60%. Unable to assess wall motion.  Cardiology consulted.     Iron deficiency anemia: Received IV iron on recent admission.  Monitor H&H and transfuse prn.   Goal Hgb >8 g/dl.  May need GI workup outpatient.    The patient expressed appropriate understanding of, and agreement with the discharge recommendations, medications, and plan.     Consults this admission:  IP  2025  What changed: These instructions start on January 28, 2025. If you are unsure what to do until then, ask your doctor or other care provider.  Notes to patient: Blood Thinner            CONTINUE taking these medications      albuterol sulfate  (90 Base) MCG/ACT inhaler  Commonly known as: Proventil HFA  Inhale 2 puffs into the lungs every 4 hours as needed for Wheezing     aspirin 81 MG EC tablet  Take 1 tablet by mouth daily  Notes to patient: Blood Thinner     budesonide-formoterol 160-4.5 MCG/ACT Aero  Commonly known as: SYMBICORT  Inhale 2 puffs into the lungs in the morning and 2 puffs in the evening.     Compressor/Nebulizer Misc  To be used with albuterol 0.083% - Dx: COPD   ICD-10: J44.9     guaiFENesin 600 MG extended release tablet  Commonly known as: MUCINEX  Notes to patient: Helps with Coughing and Mucus     HumaLOG KwikPen 100 UNIT/ML Sopn  Generic drug: insulin lispro (1 Unit Dial)     HYDROcodone-acetaminophen  MG per tablet  Commonly known as: NORCO     ipratropium 0.5 mg-albuterol 2.5 mg 0.5-2.5 (3) MG/3ML Soln nebulizer solution  Commonly known as: DUONEB  Inhale 3 mLs into the lungs every 4 hours  Notes to patient: Breathing treatment.     Lantus SoloStar 100 UNIT/ML injection pen  Generic drug: insulin glargine  Notes to patient: Blood Sugar.      lisinopril 10 MG tablet  Commonly known as: PRINIVIL;ZESTRIL  Take 1 tablet by mouth daily  Notes to patient: Blood Pressure     meclizine 25 MG tablet  Commonly known as: ANTIVERT  Take 1 tablet by mouth in the morning, at noon, and at bedtime for 10 days     metoprolol succinate 25 MG extended release tablet  Commonly known as: TOPROL XL  Take 1 tablet by mouth daily  Notes to patient: Blood Pressure     Nebulizer/Tubing/Mouthpiece Kit  1 kit by Does not apply route daily as needed (wheezing) Dx: COPD   ICD-10: J44.9     nicotine 21 MG/24HR  Commonly known as: NICODERM CQ  Place 1 patch onto the skin daily  Notes to patient:

## 2025-01-24 ENCOUNTER — TELEPHONE (OUTPATIENT)
Dept: PULMONOLOGY | Age: 50
End: 2025-01-24

## 2025-01-24 ENCOUNTER — TELEPHONE (OUTPATIENT)
Dept: CARDIOLOGY CLINIC | Age: 50
End: 2025-01-24

## 2025-01-24 LAB
BACTERIA BLD CULT ORG #2: NORMAL
BACTERIA BLD CULT: NORMAL

## 2025-01-24 NOTE — PROGRESS NOTES
Preop instructions were sent to the patient via [employer]       Provided verbal instructions via phone. Confirmed procedure date and time. Noted medications that need to be held and/or dose adjusted for procedure. Patient declines questions or concerns. Call ended mutually.

## 2025-01-24 NOTE — TELEPHONE ENCOUNTER
Patient takes 60 mg of torsemide daily. She is asking is she needs to stop her torsemide for Sunday and Monday?

## 2025-01-24 NOTE — PROGRESS NOTES
Physician Progress Note      PATIENT:               SERVANDO HARRIS  CSN #:                  [id number]  :                       1975  ADMIT DATE:       2025 7:12 AM  DISCH DATE:        2025 3:40 PM  RESPONDING  PROVIDER #:        CLAUS BROWN MD          QUERY TEXT:    Patient admitted with ***, noted to have *** If possible, please document in   progress notes and discharge summary if you are evaluating and/or treating any   of the following:    The medical record reflects the following:  Risk Factors: Hx- tobacco abuse, COPD with chronic hypoxic respiratory failure   on 2 L/min, CAD, obesity, T2 DM, HTN, morbid obesity, recently admitted here   for acute on chronic hypoxic/hypercapnic respiratory failure, CHF, CKD  Clinical Indicators: GFR 68, 90, 90...Per ED provider note on 25-Hx-   tobacco abuse, COPD with chronic hypoxic respiratory failure on 2 L/min, CAD,   obesity, T2 DM, HTN, morbid obesity, recently admitted here for acute on   chronic hypoxic/hypercapnic respiratory failure, CHF, CKD  Treatment: Monitor renal function  Options provided:  -- CKD Stage 1 GFR>90  -- CKD Stage 2 GFR 60-90  -- CKD Stage 3a GFR 45-59  -- CKD Stage 3b GFR 30-44  -- CKD Stage 4 GFR 15-29  -- CKD Stage 5 GFR<15  -- ESRD  -- CKD ruled out  -- Other - I will add my own diagnosis  -- Disagree - Not applicable / Not valid  -- Disagree - Clinically unable to determine / Unknown  -- Refer to Clinical Documentation Reviewer    PROVIDER RESPONSE TEXT:    This patient has CKD Stage 1.    Query created by: Liya Brooks on 2025 9:08 AM      Electronically signed by:  CLAUS BROWN MD 2025 12:12 PM

## 2025-01-24 NOTE — TELEPHONE ENCOUNTER
Patient is on 60mg of furosemide and on Sunday and Monday (1/26 and 1/27) she has to do a prep for a colonoscopy, and she is needing to know if she should continue to taker water pills or if she should stop them?

## 2025-01-28 ENCOUNTER — ANESTHESIA EVENT (OUTPATIENT)
Dept: ENDOSCOPY | Age: 50
End: 2025-01-28
Payer: COMMERCIAL

## 2025-01-28 ENCOUNTER — HOSPITAL ENCOUNTER (OUTPATIENT)
Age: 50
Setting detail: OUTPATIENT SURGERY
Discharge: HOME | End: 2025-01-28
Attending: INTERNAL MEDICINE | Admitting: INTERNAL MEDICINE
Payer: COMMERCIAL

## 2025-01-28 ENCOUNTER — ANESTHESIA (OUTPATIENT)
Dept: ENDOSCOPY | Age: 50
End: 2025-01-28
Payer: COMMERCIAL

## 2025-01-28 VITALS
TEMPERATURE: 98.7 F | HEART RATE: 75 BPM | RESPIRATION RATE: 15 BRPM | OXYGEN SATURATION: 97 % | HEIGHT: 62 IN | WEIGHT: 250 LBS | SYSTOLIC BLOOD PRESSURE: 131 MMHG | DIASTOLIC BLOOD PRESSURE: 61 MMHG | BODY MASS INDEX: 46.01 KG/M2

## 2025-01-28 DIAGNOSIS — D50.9 IRON DEFICIENCY ANEMIA, UNSPECIFIED IRON DEFICIENCY ANEMIA TYPE: ICD-10-CM

## 2025-01-28 DIAGNOSIS — K21.9 GASTROESOPHAGEAL REFLUX DISEASE, UNSPECIFIED WHETHER ESOPHAGITIS PRESENT: ICD-10-CM

## 2025-01-28 LAB
BASOPHILS # BLD: 0.1 K/UL (ref 0–0.2)
BASOPHILS NFR BLD: 0.6 %
DEPRECATED RDW RBC AUTO: 20.3 % (ref 12.4–15.4)
EOSINOPHIL # BLD: 0 K/UL (ref 0–0.6)
EOSINOPHIL NFR BLD: 0.3 %
GLUCOSE BLD-MCNC: 201 MG/DL (ref 70–99)
GLUCOSE BLD-MCNC: 217 MG/DL (ref 70–99)
HCT VFR BLD AUTO: 34 % (ref 36–48)
HGB BLD-MCNC: 10.2 G/DL (ref 12–16)
LYMPHOCYTES # BLD: 0.8 K/UL (ref 1–5.1)
LYMPHOCYTES NFR BLD: 4.6 %
MCH RBC QN AUTO: 24.8 PG (ref 26–34)
MCHC RBC AUTO-ENTMCNC: 30.1 G/DL (ref 31–36)
MCV RBC AUTO: 82.4 FL (ref 80–100)
MONOCYTES # BLD: 1 K/UL (ref 0–1.3)
MONOCYTES NFR BLD: 6 %
NEUTROPHILS # BLD: 14.5 K/UL (ref 1.7–7.7)
NEUTROPHILS NFR BLD: 88.5 %
PERFORMED ON: ABNORMAL
PERFORMED ON: ABNORMAL
PLATELET # BLD AUTO: 363 K/UL (ref 135–450)
PMV BLD AUTO: 8.7 FL (ref 5–10.5)
RBC # BLD AUTO: 4.13 M/UL (ref 4–5.2)
WBC # BLD AUTO: 16.4 K/UL (ref 4–11)

## 2025-01-28 PROCEDURE — 36415 COLL VENOUS BLD VENIPUNCTURE: CPT

## 2025-01-28 PROCEDURE — 7100000010 HC PHASE II RECOVERY - FIRST 15 MIN: Performed by: INTERNAL MEDICINE

## 2025-01-28 PROCEDURE — 3700000000 HC ANESTHESIA ATTENDED CARE: Performed by: INTERNAL MEDICINE

## 2025-01-28 PROCEDURE — 3700000001 HC ADD 15 MINUTES (ANESTHESIA): Performed by: INTERNAL MEDICINE

## 2025-01-28 PROCEDURE — 3609012400 HC EGD TRANSORAL BIOPSY SINGLE/MULTIPLE: Performed by: INTERNAL MEDICINE

## 2025-01-28 PROCEDURE — 6360000002 HC RX W HCPCS: Performed by: NURSE ANESTHETIST, CERTIFIED REGISTERED

## 2025-01-28 PROCEDURE — 2580000003 HC RX 258: Performed by: ANESTHESIOLOGY

## 2025-01-28 PROCEDURE — 85025 COMPLETE CBC W/AUTO DIFF WBC: CPT

## 2025-01-28 PROCEDURE — 2709999900 HC NON-CHARGEABLE SUPPLY: Performed by: INTERNAL MEDICINE

## 2025-01-28 PROCEDURE — 88305 TISSUE EXAM BY PATHOLOGIST: CPT

## 2025-01-28 PROCEDURE — 7100000011 HC PHASE II RECOVERY - ADDTL 15 MIN: Performed by: INTERNAL MEDICINE

## 2025-01-28 RX ORDER — LIDOCAINE HYDROCHLORIDE 20 MG/ML
INJECTION, SOLUTION INFILTRATION; PERINEURAL
Status: DISCONTINUED | OUTPATIENT
Start: 2025-01-28 | End: 2025-01-28 | Stop reason: SDUPTHER

## 2025-01-28 RX ORDER — SODIUM CHLORIDE 9 MG/ML
INJECTION, SOLUTION INTRAVENOUS CONTINUOUS
Status: DISCONTINUED | OUTPATIENT
Start: 2025-01-28 | End: 2025-01-28 | Stop reason: HOSPADM

## 2025-01-28 RX ORDER — PROPOFOL 10 MG/ML
INJECTION, EMULSION INTRAVENOUS
Status: DISCONTINUED | OUTPATIENT
Start: 2025-01-28 | End: 2025-01-28 | Stop reason: SDUPTHER

## 2025-01-28 RX ORDER — METOCLOPRAMIDE 5 MG/1
5 TABLET ORAL 4 TIMES DAILY
Qty: 120 TABLET | Refills: 0 | Status: SHIPPED | OUTPATIENT
Start: 2025-01-28 | End: 2025-02-27

## 2025-01-28 RX ADMIN — LIDOCAINE HYDROCHLORIDE 40 MG: 20 INJECTION, SOLUTION INFILTRATION; PERINEURAL at 09:33

## 2025-01-28 RX ADMIN — PROPOFOL 50 MG: 10 INJECTION, EMULSION INTRAVENOUS at 09:35

## 2025-01-28 RX ADMIN — PROPOFOL 50 MG: 10 INJECTION, EMULSION INTRAVENOUS at 09:37

## 2025-01-28 RX ADMIN — PROPOFOL 50 MG: 10 INJECTION, EMULSION INTRAVENOUS at 09:39

## 2025-01-28 RX ADMIN — PROPOFOL 50 MG: 10 INJECTION, EMULSION INTRAVENOUS at 09:36

## 2025-01-28 RX ADMIN — PROPOFOL 50 MG: 10 INJECTION, EMULSION INTRAVENOUS at 09:41

## 2025-01-28 RX ADMIN — LIDOCAINE HYDROCHLORIDE 60 MG: 20 INJECTION, SOLUTION INFILTRATION; PERINEURAL at 09:36

## 2025-01-28 RX ADMIN — PROPOFOL 50 MG: 10 INJECTION, EMULSION INTRAVENOUS at 09:33

## 2025-01-28 RX ADMIN — SODIUM CHLORIDE: 9 INJECTION, SOLUTION INTRAVENOUS at 09:24

## 2025-01-28 ASSESSMENT — ENCOUNTER SYMPTOMS: SHORTNESS OF BREATH: 1

## 2025-01-28 ASSESSMENT — PAIN - FUNCTIONAL ASSESSMENT
PAIN_FUNCTIONAL_ASSESSMENT: NONE - DENIES PAIN

## 2025-01-28 NOTE — ANESTHESIA PRE PROCEDURE
Dental:    (+) edentulous      Pulmonary:normal exam    (+)  COPD:  shortness of breath:   sleep apnea:       asthma:     (-) pneumonia                           Cardiovascular:    (+) hypertension:, angina:, CAD:, CABG/stent:, CHF:                  Neuro/Psych:   (+) psychiatric history:            GI/Hepatic/Renal:   (+) GERD:, morbid obesity          Endo/Other:    (+) DiabetesType II DM.                 Abdominal:             Vascular: negative vascular ROS.         Other Findings:       Anesthesia Plan      MAC     ASA 4       Induction: intravenous.    MIPS: Prophylactic antiemetics administered.  Anesthetic plan and risks discussed with patient.      Plan discussed with CRNA.    Attending anesthesiologist reviewed and agrees with Preprocedure content            Zach Goodwin MD   1/28/2025

## 2025-01-28 NOTE — BRIEF OP NOTE
Brief Postoperative Note      Patient: Cassandra Bowser  YOB: 1975  MRN: [id number]    Date of Procedure: 1/28/2025    Pre-Op Diagnosis Codes:      * Gastroesophageal reflux disease, unspecified whether esophagitis present [K21.9]     * Iron deficiency anemia, unspecified iron deficiency anemia type [D50.9]        Procedure(s):  ESOPHAGOGASTRODUODENOSCOPY BIOPSY    Surgeon(s):  Paxton Madrid MD    Anesthesia: Monitor Anesthesia Care    Estimated Blood Loss (mL): Minimal    Complications: None    Specimens:   ID Type Source Tests Collected by Time Destination   A : Duodenum Bx R/O celiac Tissue Duodenum SURGICAL PATHOLOGY Paxton Madrid MD 1/28/2025 0939    B : Gastric Bx R/O H. Pylori Tissue Stomach SURGICAL PATHOLOGY Paxton Madrid MD 1/28/2025 0940    C : Distal esophagus Bx - reflux Tissue Esophagus SURGICAL PATHOLOGY aPxton Madrid MD 1/28/2025 0941    D : Proximal esophagus Bx -reflux Tissue Esophagus SURGICAL PATHOLOGY Paxton Madrid MD 1/28/2025 0942      Impression:         -  Normal esophagus.  Biopsied.         - Gastritis, characterized by erythema. There were bile pooling in            the stomach.  Biopsied.         -  Normal examined duodenum.  Biopsied.  Recommendation:         - PATHOLOGY RESULTS: will be available in the [employer] portal or            [employer] phone jeffrey within 2 weeks. Please go to            [email or web address]. There are instructions there how to            access your medical records online and how to download the [employer] jeffrey. You can also call the GI office at  to get            your pathology results.         - Check CBC today to see if Hb remains stable         - Continue Pantoprazole 40 mg 2x/day         - If helpful, continue sucralfate (carafate)         - OTC iron tablets (325 mg) 2x/day for at least 3 months         - Trial of Reglan 5 mg before meals and before bedtime (4x/day)         - Since patient did not tolerate bowel prep

## 2025-01-28 NOTE — H&P
Pre-operative History and Physical    Patient: Cassandra Bowser  : 1975  Acct#:     History Obtained From:  patient    HISTORY OF PRESENT ILLNESS:    The patient is a 49 y.o. female with significant past medical history of SHAQUILLE and GERD who presents with for EGD. Colonoscopy was cancelled as she did not tolerate her bowel prep.     Past Medical History:        Diagnosis Date    Allergic     Anemia     Asthma     CAD (coronary artery disease)     Stent LAD    CHF (congestive heart failure) (HCC)     Chronic back pain     back    Chronic kidney disease     kidney stones    COPD (chronic obstructive pulmonary disease) (HCC)     Diabetes mellitus (HCC)     gestional diabetes only    GERD (gastroesophageal reflux disease)     Hyperlipidemia     Hypertension     Narcolepsy     Pancreatitis 10/01/2011    Pneumonia 2008    Psychiatric problem     anxiewty, depression    Sleep apnea treated with nocturnal bilevel positive airway pressure (BPAP)     Ulcer     stomach     Past Surgical History:        Procedure Laterality Date    CARDIAC CATHETERIZATION  2010, 2011    Dr. Wahl     SECTION      CHOLECYSTECTOMY, LAPAROSCOPIC  10/5/2011    with intraoperative cholangiogram    EYE SURGERY      age 1    HYSTERECTOMY (CERVIX STATUS UNKNOWN)      TUBAL LIGATION       Medications Prior to Admission:   No current facility-administered medications on file prior to encounter.     Current Outpatient Medications on File Prior to Encounter   Medication Sig Dispense Refill    predniSONE (DELTASONE) 20 MG tablet Take 1 tablet by mouth daily for 10 days 10 tablet 0    sucralfate (CARAFATE) 1 GM tablet Take 1 tablet by mouth 3 times daily (before meals) 120 tablet 3    pantoprazole (PROTONIX) 40 MG tablet Take 1 tablet by mouth 2 times daily (before meals) 30 tablet 3    guaiFENesin (MUCINEX) 600 MG extended release tablet Take 2 tablets by mouth 2 times daily      aspirin 81 MG EC tablet Take 1 tablet by mouth

## 2025-01-28 NOTE — DISCHARGE INSTRUCTIONS
Please call Dr. Madrid's office for scheduling of follow up appointments, biopsy results (in 5 business days), or any complications.  Office phone number: [phone]   ENDOSCOPY DISCHARGE INSTRUCTIONS    You may experience some lightheadedness for the next several hours.  Plan on quiet relaxation for the rest of today.  A responsible adult needs to stay with you today.  Because of the medications you received today-do not drive,operate machinery,or sign any contractual agreement for the next 24 hours.  Do not drink any alcoholic beverages or take any unprescribed medications tonight.  Eat bland food and avoid anything greasy or spicy initially-progress to your normal diet gradually.  Diet restrictions as instructed.  You may resume home medications as instructed.  It is not unusual to experience some mild cramping or gas pains, and you may not have a bowel movement for several days.  If you have any of the following problems, notify your physician or return to the hospital emergency room : fever, chills, excessive bleeding, excessive vomiting, difficulty swallowing, uncontrolled pain, increased abdominal distention, shortness of breath or any other problems.  If you had a polyp removed, avoid strenuous activity for 48 hours.Avoid the use of aspirin or related compounds for one week, unless otherwise instructed by your physician.  You may notice a small amount of blood in your next few bowel movements, but if a large amount passes, call your physician.  If you have a sore throat, you may use lozenges or salt water gargles.

## 2025-01-28 NOTE — PROGRESS NOTES
Adm to pacu bay 10 from endo per cart awakening. Alert & oriented. Respirations symmetrical. Abdomen soft. Denies pain. VSS. Report received from endo staff.

## 2025-01-28 NOTE — ANESTHESIA POSTPROCEDURE EVALUATION
Department of Anesthesiology  Postprocedure Note    Patient: Cassandra Bowser  MRN: [id number]  YOB: 1975  Date of evaluation: 1/28/2025    Procedure Summary       Date: 01/28/25 Room / Location: [place] [address] / [facility]    Anesthesia Start: 0927 Anesthesia Stop: 0949    Procedure: ESOPHAGOGASTRODUODENOSCOPY BIOPSY (Abdomen) Diagnosis:       Gastroesophageal reflux disease, unspecified whether esophagitis present      Iron deficiency anemia, unspecified iron deficiency anemia type      (Gastroesophageal reflux disease, unspecified whether esophagitis present [K21.9])      (Iron deficiency anemia, unspecified iron deficiency anemia type [D50.9])    Surgeons: Paxton Madrid MD Responsible Provider: Zach Goodwin MD    Anesthesia Type: MAC ASA Status: 4            Anesthesia Type: No value filed.    Wing Phase I: Wing Score: 10    Wing Phase II: Wing Score: 8    Anesthesia Post Evaluation    Patient location during evaluation: PACU  Patient participation: complete - patient participated  Level of consciousness: awake and alert  Airway patency: patent  Nausea & Vomiting: no vomiting and no nausea  Cardiovascular status: hemodynamically stable  Respiratory status: acceptable  Hydration status: euvolemic  Multimodal analgesia pain management approach  Pain management: satisfactory to patient    No notable events documented.

## 2025-01-28 NOTE — PROGRESS NOTES
Awake alert & oriented. Respirations symmetrical. Home portable O2 on at 2 lpm. Discharge instructions reviewed with pt &  Ralph at bedside. Understanding verbalized. Written copy to pt. Patient discharged home per w/c to the care of the responsible party. No additional questions voiced related to discharge information. Patient discharged with all personal items.

## 2025-01-31 ENCOUNTER — OFFICE VISIT (OUTPATIENT)
Dept: PULMONOLOGY | Age: 50
End: 2025-01-31
Payer: COMMERCIAL

## 2025-01-31 VITALS
HEART RATE: 81 BPM | DIASTOLIC BLOOD PRESSURE: 76 MMHG | OXYGEN SATURATION: 95 % | WEIGHT: 245.4 LBS | SYSTOLIC BLOOD PRESSURE: 128 MMHG | HEIGHT: 62 IN | BODY MASS INDEX: 45.16 KG/M2

## 2025-01-31 DIAGNOSIS — J96.12 CHRONIC RESPIRATORY FAILURE WITH HYPOXIA AND HYPERCAPNIA: ICD-10-CM

## 2025-01-31 DIAGNOSIS — K21.9 CHRONIC GERD: ICD-10-CM

## 2025-01-31 DIAGNOSIS — Z87.891 FORMER SMOKER: ICD-10-CM

## 2025-01-31 DIAGNOSIS — J44.9 COPD, SEVERITY TO BE DETERMINED (HCC): Primary | ICD-10-CM

## 2025-01-31 DIAGNOSIS — J96.11 CHRONIC RESPIRATORY FAILURE WITH HYPOXIA AND HYPERCAPNIA: ICD-10-CM

## 2025-01-31 DIAGNOSIS — E66.01 MORBID OBESITY: ICD-10-CM

## 2025-01-31 PROCEDURE — G8417 CALC BMI ABV UP PARAM F/U: HCPCS | Performed by: INTERNAL MEDICINE

## 2025-01-31 PROCEDURE — 99214 OFFICE O/P EST MOD 30 MIN: CPT | Performed by: INTERNAL MEDICINE

## 2025-01-31 PROCEDURE — 1036F TOBACCO NON-USER: CPT | Performed by: INTERNAL MEDICINE

## 2025-01-31 PROCEDURE — 3078F DIAST BP <80 MM HG: CPT | Performed by: INTERNAL MEDICINE

## 2025-01-31 PROCEDURE — 3074F SYST BP LT 130 MM HG: CPT | Performed by: INTERNAL MEDICINE

## 2025-01-31 PROCEDURE — 3017F COLORECTAL CA SCREEN DOC REV: CPT | Performed by: INTERNAL MEDICINE

## 2025-01-31 PROCEDURE — G8427 DOCREV CUR MEDS BY ELIG CLIN: HCPCS | Performed by: INTERNAL MEDICINE

## 2025-01-31 PROCEDURE — 1111F DSCHRG MED/CURRENT MED MERGE: CPT | Performed by: INTERNAL MEDICINE

## 2025-01-31 PROCEDURE — 3023F SPIROM DOC REV: CPT | Performed by: INTERNAL MEDICINE

## 2025-01-31 RX ORDER — IPRATROPIUM BROMIDE AND ALBUTEROL SULFATE 2.5; .5 MG/3ML; MG/3ML
1 SOLUTION RESPIRATORY (INHALATION) EVERY 4 HOURS
Qty: 360 ML | Refills: 3 | Status: SHIPPED | OUTPATIENT
Start: 2025-01-31

## 2025-01-31 RX ORDER — PREDNISONE 10 MG/1
10 TABLET ORAL DAILY
Qty: 30 TABLET | Refills: 3 | Status: SHIPPED | OUTPATIENT
Start: 2025-01-31 | End: 2025-05-31

## 2025-01-31 RX ORDER — PREDNISONE 20 MG/1
20 TABLET ORAL DAILY
Qty: 10 TABLET | Refills: 0 | Status: CANCELLED | OUTPATIENT
Start: 2025-01-31 | End: 2025-02-10

## 2025-01-31 NOTE — PROGRESS NOTES
fluticasone-umeclidin-vilant (TRELEGY ELLIPTA) 100-62.5-25 MCG/ACT AEPB inhaler Inhale 1 puff into the lungs daily Yes Acacia Andre MD   lisinopril (PRINIVIL;ZESTRIL) 10 MG tablet Take 1 tablet by mouth daily Yes Acacia Andre MD   metoprolol succinate (TOPROL XL) 25 MG extended release tablet Take 1 tablet by mouth daily Yes Acacia Andre MD   budesonide-formoterol (SYMBICORT) 160-4.5 MCG/ACT AERO Inhale 2 puffs into the lungs in the morning and 2 puffs in the evening. Yes Acacia Andre MD   nicotine (NICODERM CQ) 21 MG/24HR Place 1 patch onto the skin daily Yes Acacia Andre MD   spironolactone (ALDACTONE) 25 MG tablet Take 1 tablet by mouth daily Yes Acacia Andre MD   torsemide 60 MG TABS Take 60 mg by mouth daily Yes Acacia Andre MD   insulin glargine (LANTUS SOLOSTAR) 100 UNIT/ML injection pen Inject 45 Units into the skin nightly Yes Kinga Verde MD   insulin lispro, 1 Unit Dial, (HUMALOG KWIKPEN) 100 UNIT/ML SOPN Inject 33-47 Units into the skin 3 times daily (before meals) Yes Kinga Verde MD   ondansetron (ZOFRAN-ODT) 4 MG disintegrating tablet Take 1 tablet by mouth every 4 hours as needed for Nausea or Vomiting Yes Kinga Verde MD   nitroGLYCERIN (NITROSTAT) 0.4 MG SL tablet Place 1 tablet under the tongue every 5 minutes as needed for Chest pain Yes Attila Cantu MD   HYDROcodone-acetaminophen (NORCO)  MG per tablet Take 1 tablet by mouth every 4-6 hours as needed for Pain. Yes Kinga Verde MD   Respiratory Therapy Supplies (NEBULIZER/TUBING/MOUTHPIECE) KIT 1 kit by Does not apply route daily as needed (wheezing) Dx: COPD   ICD-10: J44.9 Yes Hiren Clinton MD   Nebulizers (COMPRESSOR/NEBULIZER) MISC To be used with albuterol 0.083% - Dx: COPD   ICD-10: J44.9 Yes Hiren Clinton MD       Vitals:    01/31/25 1458   BP: 128/76   Site: Left Lower Arm   Position: Sitting   Cuff Size: Medium Adult   Pulse: 81   SpO2: 95%   Weight: 111.3 kg (245 lb 6.4

## 2025-02-03 ENCOUNTER — TELEPHONE (OUTPATIENT)
Dept: PULMONOLOGY | Age: 50
End: 2025-02-03

## 2025-02-03 NOTE — TELEPHONE ENCOUNTER
PT called and needs a refill on medication    [id number] - [place], [place] - [address] - P [phone] - F [phone]

## 2025-02-03 NOTE — PROGRESS NOTES
[facility]   Congestive Heart Failure    Primary Care Doctor:  Cody Solano MD  Primary Cardiologist: julian       Chief Complaint:  chf    History of Present Illness:  Cassandra Bowser is a 49 y.o. female with CAD, PCI, COPD, diabetes mellitus, hypertension, dyslipidemia, narcolepsy, obesit  who presents today for hospital f/u.   Cassandra Bowser was readmitted 1/20/25 and discharged 1/23/25.    Hospital course:  Acute on chronic respiratory failure.  Patient was seen by pulmonology and was treated with steroids, DuoNebs and required BiPAP.  Prior to patient's discharge, she underwent an nocturnal pulse ox and qualified for BiPAP.  Patient during admission was also found to be anemic and will follow-up with GI in the outpatient setting for EGD and colonoscopy.     Acute on chronic hypoxic respiratory failure with hypercapnia:  ABG reviewed. Currently requiring 3L/min.  Nightly BiPAP ordered.  Pulm consulted for evaluation and possible adjustment of BiPAP settings.     Worsening GERD  Pt endorses that for the past few months her GERD has been very severe. Pt denies any evidence of GI bleeding. She does have to take multiple famotidine's for relieve it  -GI consulted     T2DM on long-term insulin: Follow-up A1c.  Monitor on basal/prandial insulin with SSI.     Suspected diastolic CHF: Doubt acute decompensation  Cardiology consulted 1/16: May have chronic diastolic HF, but echo not suggestive of that. proBNP 773, 184.  Chest x-ray unchanged from prior.  Hold Lasix and Aldactone.     Elevated troponin:  HS- troponin: 74, 65. 32 on 1/14/2025.  EKG with no acute ST-T changes.  TTE 1/14/2025: LVEF 55 to 60%. Unable to assess wall motion.  Cardiology consulted.     Iron deficiency anemia: Received IV iron on recent admission.  Monitor H&H and transfuse prn.   Goal Hgb >8 g/dl.  May need GI workup outpatient.         Since hospitalization she reports occasional chest pain, fatigue, stable edema and denies

## 2025-02-03 NOTE — TELEPHONE ENCOUNTER
Last appointment:  1/31/2025    Next appointment:  3/7/2025    LM on patient's VM to call the office and verify Spiriva.

## 2025-02-04 ENCOUNTER — OFFICE VISIT (OUTPATIENT)
Dept: CARDIOLOGY CLINIC | Age: 50
End: 2025-02-04

## 2025-02-04 VITALS
DIASTOLIC BLOOD PRESSURE: 60 MMHG | BODY MASS INDEX: 46.44 KG/M2 | WEIGHT: 246 LBS | HEART RATE: 80 BPM | SYSTOLIC BLOOD PRESSURE: 120 MMHG | OXYGEN SATURATION: 94 % | HEIGHT: 61 IN

## 2025-02-04 DIAGNOSIS — I10 ESSENTIAL HYPERTENSION: ICD-10-CM

## 2025-02-04 DIAGNOSIS — Z09 HOSPITAL DISCHARGE FOLLOW-UP: ICD-10-CM

## 2025-02-04 DIAGNOSIS — R60.0 PERIPHERAL EDEMA: ICD-10-CM

## 2025-02-04 DIAGNOSIS — I50.31 ACUTE DIASTOLIC CONGESTIVE HEART FAILURE (HCC): Primary | ICD-10-CM

## 2025-02-04 DIAGNOSIS — I25.10 CORONARY ARTERY DISEASE DUE TO LIPID RICH PLAQUE: ICD-10-CM

## 2025-02-04 DIAGNOSIS — I25.83 CORONARY ARTERY DISEASE DUE TO LIPID RICH PLAQUE: ICD-10-CM

## 2025-02-04 RX ORDER — LISINOPRIL 10 MG/1
5 TABLET ORAL DAILY
Qty: 30 TABLET | Refills: 5 | Status: SHIPPED | OUTPATIENT
Start: 2025-02-04

## 2025-02-04 RX ORDER — ROSUVASTATIN CALCIUM 20 MG/1
20 TABLET, COATED ORAL DAILY
Qty: 90 TABLET | Refills: 1 | Status: SHIPPED | OUTPATIENT
Start: 2025-02-04

## 2025-02-04 ASSESSMENT — ENCOUNTER SYMPTOMS
GASTROINTESTINAL NEGATIVE: 1
RESPIRATORY NEGATIVE: 1

## 2025-02-04 NOTE — PATIENT INSTRUCTIONS
Visit Summary and Instructions:     Today we talked about:   Diastolic heart failure which means the heart muscle is stiff and cannot fill properly with blood. This can cause fluid build up in different areas of your body including your lungs, also known as congestive heart failure (CHF).   Congestive Heart Failure Symptoms  Some symptoms of heart failure you might be able to see, while others you may feel. It is important you pay attention to your body. Monitor for all symptoms.  Call [phone] when you notice something is wrong. By calling early, you may feel better sooner and stay well at home. If your symptoms are severe, the doctor might see you in the office or tell you to go to the hospital.   Trouble Breathing  Feeling short of breath when doing everyday activities or even at rest  Waking up feeling like you need to sit up to breathe  Trouble breathing when lying flat or feeling more comfortable sitting upright to sleep  Cough or Wheezing  Dry, hacking cough that is worse when you lie down  Swelling  Swelling in your feet, ankles or legs  Jewelry, clothing or shoes fitting tighter  Swelling in abdomen  Weight Gain  Rapid weight gain of 3 pounds or more in 1-7 days  Work with your doctor to find the ideal weight range for you to compare weight gain or loss    Tired Feeling  Feeling tired doing normal activities, like walking, bathing and shopping  Nausea, Bloating or Lack of Appetite  Feeling full or sick to your stomach after eating small meals or more so than usual  Abdominal bloating  Confusion or Impaired Thinking  Memory loss, a “foggy” feeling or confusion  This might be better observed by friends or family      Action Items for you:   Weigh yourself every day in the morning after urination, call Isa if your wt increases 2-3lb in one day or 5lb in one week -- [phone]  Limit sodium to 3000mg/day and fluids to 2L or 64oz/day.   3.   Check your blood pressure daily and call if the top number is

## 2025-02-07 DIAGNOSIS — J44.9 COPD, SEVERITY TO BE DETERMINED (HCC): Primary | ICD-10-CM

## 2025-02-19 PROBLEM — R79.89 ELEVATED TROPONIN: Status: RESOLVED | Noted: 2025-01-20 | Resolved: 2025-02-19

## 2025-02-21 ENCOUNTER — TELEPHONE (OUTPATIENT)
Dept: OTHER | Age: 50
End: 2025-02-21

## 2025-02-21 DIAGNOSIS — D64.9 ANEMIA, UNSPECIFIED TYPE: Primary | ICD-10-CM

## 2025-02-21 RX ORDER — TORSEMIDE 20 MG/1
60 TABLET ORAL DAILY
Qty: 90 TABLET | Refills: 3 | Status: SHIPPED | OUTPATIENT
Start: 2025-02-21

## 2025-02-21 NOTE — PROGRESS NOTES
[facility]   Congestive Heart Failure    Primary Care Doctor:  Cody Solano MD  Primary Cardiologist: julian       Chief Complaint:  chf    History of Present Illness:  Cassandra Bowser is a 49 y.o. female with CAD, PCI, COPD, diabetes mellitus, hypertension, dyslipidemia, narcolepsy, obesit  who presents today for CHF f/u. At hosp f/u visit: ACE started and crestor restarted    Today she has gained 4lb on our scale since her last OV with me, reports SaO2 dropping, and feeling shaky. She only took one dose lisinopril last night and her BP is low today. She x/o occasional chest pain, fatigue, and  edema and denies  palpitations, orthopnea, PND, exertional chest pressure/discomfort, early saiety, syncope. . Still drinking one rockstar daily    Today's home wt:252      Baseline Weight: 245  Wt Readings from Last 3 Encounters:   02/25/25 113.4 kg (250 lb)   02/04/25 111.6 kg (246 lb)   01/31/25 111.3 kg (245 lb 6.4 oz)         EF: 55-60%  Cardiac Imaging:  Echo 1/14/2025:    Left Ventricle: Normal left ventricular systolic function with a visually estimated EF of 55 - 60%. Left ventricle size is normal. Normal wall thickness. Unable to assess wall motion.    Right Ventricle: Not well visualized.    Image quality is poor. Contrast used: Lumason. Technically difficult study due to patient's body habitus.     CTA 1/13/2025:  IMPRESSION:  1. No evidence of pulmonary embolism.  2. Mosaic attenuation within both lungs, which may be related to expiratory  phase of imaging versus air trapping or small vessel/airways disease.  3. The heart and pericardium demonstrate no acute abnormality.  Coronary artery  calcifications.  There is no acute abnormality of the thoracic aorta.     Cardiac cath [facility] 11/22/2022:  LVEDP was elevated at 23mmHg.  There was a mild pressure gradient on   pullback across the aortic valve.     Patent mid LAD and RCA (mid and distal) stents.  There is otherwise   mild-to-moderate

## 2025-02-21 NOTE — TELEPHONE ENCOUNTER
Cassandra called and left a message on the Resource Line. States she is still using the walker and was worried about falling and had to reschedule her appointment this morning for next week. She is asking if she could get a new RX for torsemide sent to [facility]. (Looks like original was sent by hospitalist at discharge). Patient also asking if she could have CBC added to labwork. Was going to have someone take her tomorrow to get her labs (BNP, BMP)

## 2025-02-25 ENCOUNTER — OFFICE VISIT (OUTPATIENT)
Dept: CARDIOLOGY CLINIC | Age: 50
End: 2025-02-25

## 2025-02-25 ENCOUNTER — TELEPHONE (OUTPATIENT)
Dept: PULMONOLOGY | Age: 50
End: 2025-02-25

## 2025-02-25 ENCOUNTER — HOSPITAL ENCOUNTER (OUTPATIENT)
Age: 50
Discharge: HOME | End: 2025-02-25
Payer: COMMERCIAL

## 2025-02-25 VITALS
HEIGHT: 61 IN | WEIGHT: 250 LBS | HEART RATE: 87 BPM | OXYGEN SATURATION: 96 % | DIASTOLIC BLOOD PRESSURE: 46 MMHG | BODY MASS INDEX: 47.2 KG/M2 | SYSTOLIC BLOOD PRESSURE: 84 MMHG

## 2025-02-25 DIAGNOSIS — I25.83 CORONARY ARTERY DISEASE DUE TO LIPID RICH PLAQUE: ICD-10-CM

## 2025-02-25 DIAGNOSIS — D64.9 ANEMIA, UNSPECIFIED TYPE: ICD-10-CM

## 2025-02-25 DIAGNOSIS — I50.31 ACUTE DIASTOLIC CONGESTIVE HEART FAILURE (HCC): Primary | ICD-10-CM

## 2025-02-25 DIAGNOSIS — I25.10 CORONARY ARTERY DISEASE DUE TO LIPID RICH PLAQUE: ICD-10-CM

## 2025-02-25 DIAGNOSIS — I95.2 HYPOTENSION DUE TO DRUGS: ICD-10-CM

## 2025-02-25 DIAGNOSIS — R06.09 DYSPNEA ON EXERTION: ICD-10-CM

## 2025-02-25 DIAGNOSIS — I50.31 ACUTE DIASTOLIC CONGESTIVE HEART FAILURE (HCC): ICD-10-CM

## 2025-02-25 PROCEDURE — 83880 ASSAY OF NATRIURETIC PEPTIDE: CPT

## 2025-02-25 PROCEDURE — 36415 COLL VENOUS BLD VENIPUNCTURE: CPT

## 2025-02-25 PROCEDURE — 80048 BASIC METABOLIC PNL TOTAL CA: CPT

## 2025-02-25 PROCEDURE — 85027 COMPLETE CBC AUTOMATED: CPT

## 2025-02-25 RX ORDER — FLUCONAZOLE 200 MG/1
200 TABLET ORAL DAILY
Qty: 14 TABLET | Refills: 0 | Status: SHIPPED | OUTPATIENT
Start: 2025-02-25 | End: 2025-03-11

## 2025-02-25 ASSESSMENT — ENCOUNTER SYMPTOMS: SHORTNESS OF BREATH: 1

## 2025-02-25 NOTE — PATIENT INSTRUCTIONS
Visit Summary and Instructions:     Action Items for you:   Weigh yourself every day in the morning after urination, call Isa if your wt increases 2-3lb in one day or 5lb in one week -- [phone]  Limit sodium to 3000mg/day and fluids to 2L or 64oz/day.   3.   Check your blood pressure daily and call if the top number is under 90 or over 165       Medications started or adjusted today:   Stop lisinopril   Change torsemide to 2 pills with breakfast and lunch until your wt is 248 on your scale then go back to 3plls a day. If your wt goes back up then go back to 2 twice a day      Tests ordered today:   Labs today      Follow Up:   Schedule an appointment with Isa for 4 weeks

## 2025-02-25 NOTE — TELEPHONE ENCOUNTER
Patient called stating that her oxygen has been dropping all night even while on her BiPAP. Stated that O2 dropped to 92% and is usually 99%-100% on BiPAP. Voiced patient that 92% is still in good range but voiced understanding that it's not her normal.    Also stated that she has the shakes again and her breathing is tight; she can tell it's not like it was a week ago. O2 drops to 83%-84% when she gets up to move around. Reports she does think she has a cold w/ a runny nose.    PH:[phone]

## 2025-02-25 NOTE — TELEPHONE ENCOUNTER
Patient called and said her vision is blurred like it was last time.  When she is on her BiPAP O2 drops to 92% and she said it never drops that low.  Exertion O2 drops to 83-84%.  Patient is shaky and her breathing is tight.  Patient is here in cardiology if you want her to get any blood work done or testing.

## 2025-02-26 ENCOUNTER — TELEPHONE (OUTPATIENT)
Dept: CARDIOLOGY CLINIC | Age: 50
End: 2025-02-26

## 2025-02-26 LAB
ANION GAP SERPL CALCULATED.3IONS-SCNC: 16 MMOL/L (ref 3–16)
BUN SERPL-MCNC: 37 MG/DL (ref 7–20)
CALCIUM SERPL-MCNC: 9.3 MG/DL (ref 8.3–10.6)
CHLORIDE SERPL-SCNC: 94 MMOL/L (ref 99–110)
CO2 SERPL-SCNC: 25 MMOL/L (ref 21–32)
CREAT SERPL-MCNC: 1.3 MG/DL (ref 0.6–1.1)
DEPRECATED RDW RBC AUTO: 21.8 % (ref 12.4–15.4)
GFR SERPLBLD CREATININE-BSD FMLA CKD-EPI: 50 ML/MIN/{1.73_M2}
GLUCOSE SERPL-MCNC: 237 MG/DL (ref 70–99)
HCT VFR BLD AUTO: 30 % (ref 36–48)
HGB BLD-MCNC: 8.7 G/DL (ref 12–16)
MCH RBC QN AUTO: 23.8 PG (ref 26–34)
MCHC RBC AUTO-ENTMCNC: 28.9 G/DL (ref 31–36)
MCV RBC AUTO: 82.6 FL (ref 80–100)
NT-PROBNP SERPL-MCNC: 80 PG/ML (ref 0–124)
PLATELET # BLD AUTO: 372 K/UL (ref 135–450)
PMV BLD AUTO: 8.7 FL (ref 5–10.5)
POTASSIUM SERPL-SCNC: 4.6 MMOL/L (ref 3.5–5.1)
RBC # BLD AUTO: 3.64 M/UL (ref 4–5.2)
SODIUM SERPL-SCNC: 135 MMOL/L (ref 136–145)
WBC # BLD AUTO: 17.9 K/UL (ref 4–11)

## 2025-02-26 NOTE — TELEPHONE ENCOUNTER
Pt saw her blood work results and she would like to go over them with someone.  She would like to know what NPKV would like for her to do.    Please advise.

## 2025-02-26 NOTE — TELEPHONE ENCOUNTER
----- Message from YOCASTA Rizvi CNP sent at 2/26/2025  8:32 AM EST -----  Her blood count is dropping again, does she have more f/u with GI? No other orders from me. SELENA    Spoke to patient she has a colonoscopy scheduled 03/04/2025. She will stop her plavix tomorrow

## 2025-02-28 ENCOUNTER — OFFICE VISIT (OUTPATIENT)
Dept: PULMONOLOGY | Age: 50
End: 2025-02-28
Payer: COMMERCIAL

## 2025-02-28 VITALS
SYSTOLIC BLOOD PRESSURE: 134 MMHG | OXYGEN SATURATION: 95 % | DIASTOLIC BLOOD PRESSURE: 76 MMHG | BODY MASS INDEX: 47.31 KG/M2 | HEIGHT: 61 IN | WEIGHT: 250.6 LBS | HEART RATE: 82 BPM

## 2025-02-28 DIAGNOSIS — E66.01 MORBID OBESITY: ICD-10-CM

## 2025-02-28 DIAGNOSIS — G47.33 OBSTRUCTIVE SLEEP APNEA: ICD-10-CM

## 2025-02-28 DIAGNOSIS — N18.31 STAGE 3A CHRONIC KIDNEY DISEASE (HCC): ICD-10-CM

## 2025-02-28 DIAGNOSIS — J96.12 CHRONIC RESPIRATORY FAILURE WITH HYPOXIA AND HYPERCAPNIA (HCC): ICD-10-CM

## 2025-02-28 DIAGNOSIS — J96.11 CHRONIC RESPIRATORY FAILURE WITH HYPOXIA AND HYPERCAPNIA (HCC): ICD-10-CM

## 2025-02-28 DIAGNOSIS — I50.32 CHRONIC DIASTOLIC HEART FAILURE (HCC): ICD-10-CM

## 2025-02-28 DIAGNOSIS — J44.9 COPD, SEVERITY TO BE DETERMINED (HCC): Primary | ICD-10-CM

## 2025-02-28 PROCEDURE — 99214 OFFICE O/P EST MOD 30 MIN: CPT | Performed by: INTERNAL MEDICINE

## 2025-02-28 PROCEDURE — 3017F COLORECTAL CA SCREEN DOC REV: CPT | Performed by: INTERNAL MEDICINE

## 2025-02-28 PROCEDURE — 3023F SPIROM DOC REV: CPT | Performed by: INTERNAL MEDICINE

## 2025-02-28 PROCEDURE — G8417 CALC BMI ABV UP PARAM F/U: HCPCS | Performed by: INTERNAL MEDICINE

## 2025-02-28 PROCEDURE — 3078F DIAST BP <80 MM HG: CPT | Performed by: INTERNAL MEDICINE

## 2025-02-28 PROCEDURE — G8427 DOCREV CUR MEDS BY ELIG CLIN: HCPCS | Performed by: INTERNAL MEDICINE

## 2025-02-28 PROCEDURE — 3075F SYST BP GE 130 - 139MM HG: CPT | Performed by: INTERNAL MEDICINE

## 2025-02-28 PROCEDURE — 1036F TOBACCO NON-USER: CPT | Performed by: INTERNAL MEDICINE

## 2025-02-28 RX ORDER — DOXYCYCLINE 100 MG/1
100 CAPSULE ORAL 2 TIMES DAILY
Qty: 20 CAPSULE | Refills: 0 | Status: SHIPPED | OUTPATIENT
Start: 2025-02-28 | End: 2025-03-10

## 2025-02-28 NOTE — PROGRESS NOTES
[facility]  Follow Up Note  Dmitry Chew MD       Cassandra Bowser   YOB: 1975    Date of Visit:  2/28/2025    Assessment/Plan:  1. COPD, severity to be determined (HCC)  She was sent from the office to the ER at her last visit  She was found to have acute on chronic respiratory failure and is now on BiPAP with sleep  I asked her to bring the dvice with her next appt.    Plan:  Symbicort 160/4.5 2 puffs bid  Add Spiriva back  Duoneb HHN BID-TID  Continue Prednisone 10 mg per day  Give her a round of Doxy  She is not wheezing.    I reviewed lab work for her.  She is anemic with Hb 8.7  Also has worsening renal function with Cr 1.3 (up from 0.6)    Have her follow up for her anemia and renal insufficiency with her PCP and Cardiology (increased diuretic recently).      2. Chronic hypercapnic and hypoxemic On O2 2lpm 24 h/day  Also on BiPAP with sleep  Doing much better on this    3.  Morbid obesity, unspecified obesity type  Obesity is a contributor to her symptoms      5. Chronic GERD  This is a big problem for her and maybe responsible for the sensation she gets in her throat that seems to drive her to taking Prednisone    6. Tobacco abuse  Nonsmoker since 1/13/25  She also quit caffeine. She was drinking up to 6 Rockstars a day    F/U here one month     Chief Complaint   Patient presents with    1 Month Follow-Up     O2 dropping  Tight breathing       HPI  The patient presents today for hospital follow up. She had two recent hospitalizations related to respiratory failure and COPD exacerbation. Since her hospital stay, she has quit smoking. She is also now on BiPAP with sleep and finds that beneficial. She is on 2 LPM O2 supplement 24 hours per day. . She is on Symbicort     She feels like she has the start of an infection. Her chest is tight and she has green sputum. She is still on Prednisone 10 mg. She does not report wheezing        Review of

## 2025-03-11 ENCOUNTER — OFFICE VISIT (OUTPATIENT)
Dept: ENDOCRINOLOGY | Age: 50
End: 2025-03-11
Payer: COMMERCIAL

## 2025-03-11 VITALS
RESPIRATION RATE: 14 BRPM | TEMPERATURE: 98 F | SYSTOLIC BLOOD PRESSURE: 115 MMHG | DIASTOLIC BLOOD PRESSURE: 60 MMHG | WEIGHT: 250 LBS | HEIGHT: 61 IN | BODY MASS INDEX: 47.2 KG/M2 | HEART RATE: 76 BPM

## 2025-03-11 DIAGNOSIS — J44.9 COPD, SEVERITY TO BE DETERMINED (HCC): ICD-10-CM

## 2025-03-11 DIAGNOSIS — E11.29 CONTROLLED TYPE 2 DIABETES MELLITUS WITH MICROALBUMINURIA, WITH LONG-TERM CURRENT USE OF INSULIN (HCC): Primary | ICD-10-CM

## 2025-03-11 DIAGNOSIS — Z79.4 CONTROLLED TYPE 2 DIABETES MELLITUS WITH MICROALBUMINURIA, WITH LONG-TERM CURRENT USE OF INSULIN (HCC): Primary | ICD-10-CM

## 2025-03-11 DIAGNOSIS — E66.01 MORBID OBESITY: ICD-10-CM

## 2025-03-11 DIAGNOSIS — I50.32 CHRONIC DIASTOLIC HEART FAILURE (HCC): ICD-10-CM

## 2025-03-11 DIAGNOSIS — J38.1 VOCAL CORD POLYP: ICD-10-CM

## 2025-03-11 DIAGNOSIS — F19.20 STEROID DEPENDENCE (HCC): ICD-10-CM

## 2025-03-11 DIAGNOSIS — I25.10 CORONARY ARTERY DISEASE INVOLVING NATIVE CORONARY ARTERY OF NATIVE HEART WITHOUT ANGINA PECTORIS: ICD-10-CM

## 2025-03-11 DIAGNOSIS — R80.9 CONTROLLED TYPE 2 DIABETES MELLITUS WITH MICROALBUMINURIA, WITH LONG-TERM CURRENT USE OF INSULIN (HCC): Primary | ICD-10-CM

## 2025-03-11 PROCEDURE — 99204 OFFICE O/P NEW MOD 45 MIN: CPT | Performed by: INTERNAL MEDICINE

## 2025-03-11 PROCEDURE — 3017F COLORECTAL CA SCREEN DOC REV: CPT | Performed by: INTERNAL MEDICINE

## 2025-03-11 PROCEDURE — 3078F DIAST BP <80 MM HG: CPT | Performed by: INTERNAL MEDICINE

## 2025-03-11 PROCEDURE — 1036F TOBACCO NON-USER: CPT | Performed by: INTERNAL MEDICINE

## 2025-03-11 PROCEDURE — 3074F SYST BP LT 130 MM HG: CPT | Performed by: INTERNAL MEDICINE

## 2025-03-11 PROCEDURE — G8427 DOCREV CUR MEDS BY ELIG CLIN: HCPCS | Performed by: INTERNAL MEDICINE

## 2025-03-11 PROCEDURE — 3023F SPIROM DOC REV: CPT | Performed by: INTERNAL MEDICINE

## 2025-03-11 PROCEDURE — 2022F DILAT RTA XM EVC RTNOPTHY: CPT | Performed by: INTERNAL MEDICINE

## 2025-03-11 PROCEDURE — G8417 CALC BMI ABV UP PARAM F/U: HCPCS | Performed by: INTERNAL MEDICINE

## 2025-03-11 PROCEDURE — 3044F HG A1C LEVEL LT 7.0%: CPT | Performed by: INTERNAL MEDICINE

## 2025-03-11 PROCEDURE — 95251 CONT GLUC MNTR ANALYSIS I&R: CPT | Performed by: INTERNAL MEDICINE

## 2025-03-11 RX ORDER — INSULIN GLARGINE 300 U/ML
INJECTION, SOLUTION SUBCUTANEOUS
Qty: 5 ADJUSTABLE DOSE PRE-FILLED PEN SYRINGE | Refills: 4 | Status: SHIPPED | OUTPATIENT
Start: 2025-03-11

## 2025-03-11 NOTE — PATIENT INSTRUCTIONS
if tirzepatide will harm an unborn baby. Tell your doctor if you are pregnant or plan to become pregnant.  Tirzepatide can make birth control pills less effective. Ask your doctor about other birth control options such as an injection, implant, skin patch, vaginal ring, condom, diaphragm, cervical cap, or contraceptive sponge. If you take birth control pills you may need to use different birth control options for 4 weeks after starting this medicine and for 4 weeks each time the dose is raised.  Ask a doctor if it is safe to breastfeed while using this medicine.  How should I use tirzepatide?  Follow all directions on your prescription label and read all medication guides or instruction sheets. Your doctor may occasionally change your dose. Use the medicine exactly as directed.  Tirzepatide is injected under the skin once per week. If you change your dosing day, allow at least 3 days to pass between doses.  You may use tirzepatide with or without food.  Your healthcare provider will show you where to inject tirzepatide. Do not inject into the same place two times in a row.  You may have low blood sugar (hypoglycemia) and feel very hungry, dizzy, irritable, confused, anxious, or shaky. To quickly treat hypoglycemia, eat or drink a fast-acting source of sugar (fruit juice, hard candy, crackers, raisins, or non-diet soda).  You may give an injection of tirzepatide and insulin in the same area (such as upper arm), but not right next to each other.  Do not mix insulin and tirzepatide in the same injection.  Do not share this medicine with another person, even if they have the same symptoms you have.  Blood sugar levels can be affected by stress, illness, surgery, exercise, alcohol use, or skipping meals. Ask your doctor before changing your dose or medication schedule.  Each injection pen or prefilled syringe is for one use only. Throw away after one use, even if there is still medicine left inside. Use a puncture-proof

## 2025-03-11 NOTE — PROGRESS NOTES
<100).    Additional Education: referred to Diabetes Educator.    - Comprehensive Metabolic Panel; Future  - Hemoglobin A1C; Future  - Lipid Panel; Future  - Albumin/Creatinine Ratio, Urine; Future  - TSH; Future  - US HEAD NECK SOFT TISSUE; Future    2. Steroid dependence (HCC)  On 10 mg Prednisone     3. Morbid obesity  On long term steroids almost 80 mg daily for 2 years     4. Vocal cord polyp  Stable     5. COPD, severity to be determined (HCC)  Stable     6. Coronary artery disease involving native coronary artery of native heart without angina pectoris  Stable follows with Cards     7. Chronic diastolic heart failure (HCC)  On spironolactone , torsemide       Reviewed and/or ordered clinical lab results yes   Reviewed and/or ordered radiology tests Yes  Reviewed and/or ordered other diagnostic tests yes   Made a decision to obtain old records yes   Reviewed and summarized old records yes     Cassandra Bowser was counseled regarding symptoms of current diagnosis, course and complications of disease if inadequately treated, side effects of medications, diagnosis, treatment options, and prognosis, risks, benefits, complications, and alternatives of treatment, labs, imaging and other studies and treatment targets and goals.  She understands instructions and counseling    Total time spent counseling 45+ min  , more than 50 % of this was spent on face to face counseling    Return in about 3 months (around 6/11/2025).    Please note that some or all of this report was generated using voice recognition software. Please notify me in case of any questions about the content of this document, as some errors in transcription may have occurred .

## 2025-03-12 ENCOUNTER — TELEPHONE (OUTPATIENT)
Dept: CARDIOLOGY CLINIC | Age: 50
End: 2025-03-12

## 2025-03-12 ENCOUNTER — TELEPHONE (OUTPATIENT)
Dept: PULMONOLOGY | Age: 50
End: 2025-03-12

## 2025-03-12 NOTE — TELEPHONE ENCOUNTER
Spoke to patient she is not having chest pain now, but when her heart rate was 120 today she had chest pain, she is taking her water pill torsemide 60 mg daily, she is not watching her diet, when she is at rest she has no chest pain, she has sob with exertion, her 02 sat has been in the 90's, no swelling in legs just her ankles, swelling in abdomen, she had a iron infusion today and had labs done today at hematology, should she increase her torsemide? She will go to the ER if her symptoms get worse.    Spoke to patient she is feeling a bit better today 03/13/2025 she will take the torsemide as instructed.

## 2025-03-12 NOTE — TELEPHONE ENCOUNTER
Patient called saying Candice Saenz from Hematology ines her blood and it shows infection.  CO2 level 33.3  last time 28.   Creatinine and kidney are back to normal.  Patient is holding water again.  UA was done and no results yet.  Hematology will be sending a copy.   Patient would like to know your thoughts.

## 2025-03-12 NOTE — TELEPHONE ENCOUNTER
Called patient to let her know CO2 is not concerning.  We will wait for the rest of the results to be faxed over.

## 2025-03-12 NOTE — TELEPHONE ENCOUNTER
Pt called stating they have been retaining fluids in the abdominal area, also had increase SOB, w/ occasional CP    pt has ad 4 lb gain in 13 days  250 current wt  254     Early Feb pt wt was at  248     Pt is currently torsemide (DEMADEX) 20 MG tablet   TID     Pt would like to know   [phone]

## 2025-03-13 ENCOUNTER — TELEPHONE (OUTPATIENT)
Dept: PULMONOLOGY | Age: 50
End: 2025-03-13

## 2025-03-13 ENCOUNTER — TELEPHONE (OUTPATIENT)
Dept: ENDOCRINOLOGY | Age: 50
End: 2025-03-13

## 2025-03-13 NOTE — TELEPHONE ENCOUNTER
Called and s/w pt. Message given verbatim. Pt stated when she takes humalog it does not seem to do anything with blood sugars. Pt stated she has not taken long acting insulin in two days due to pharmacy being out of stock but was able to  script and will take first dose today. Please advise.

## 2025-03-13 NOTE — TELEPHONE ENCOUNTER
Pt asking when she should take the medication toujeo morning or night it says once a day pt stating PA is needed fro medication below       insulin lispro (HUMALOG KWIKPEN U-200) 200 UNIT/ML SOPN pen       Tirzepatide 2.5 MG/0.5ML SOAJ

## 2025-03-13 NOTE — TELEPHONE ENCOUNTER
She can take it either in the morning or at night what ever time works for her, she needs to take it regularly at the same time

## 2025-03-13 NOTE — TELEPHONE ENCOUNTER
Patient's blood results are scanned in to chart from [facility].  Patient would like your thoughts since she feels she may have infection.

## 2025-03-14 NOTE — TELEPHONE ENCOUNTER
Submitted PA for Mounjaro Via [facility] Key: [id number] STATUS: PENDING.    Follow up done daily; if no decision with in three days we will refax.  If another three days goes by with no decision will call the insurance for status.

## 2025-03-14 NOTE — TELEPHONE ENCOUNTER
The medication was DENIED. Generic Denial: Auto response per portal. No letter generated. please see note below    DIABETIC MEDICATION DENIALS: Must have a A1C greater the 7.0 for new starts.     If you want an APPEAL; please note in this encounter what new information you would like to APPEAL with.  Once complete route back to PA POOL (P MHCX PSC MEDICATION PRE-AUTH).      Thank you please advise patient.

## 2025-03-14 NOTE — TELEPHONE ENCOUNTER
Submitted PA for Humalog KwikPen Via [facility] Key: [id number] STATUS: PENDING.    Follow up done daily; if no decision with in three days we will refax.  If another three days goes by with no decision will call the insurance for status.

## 2025-03-14 NOTE — TELEPHONE ENCOUNTER
The medication is APPROVED 3/14/2025.    Your PA request for [id number] was approved for 365 days. The PA# assigned is [id number].    Authorization Expiration Date: 3/13/2026.    If this requires a response please respond to the pool ( P MHCX PSC MEDICATION PRE-AUTH).      Thank you please advise patient.

## 2025-03-14 NOTE — TELEPHONE ENCOUNTER
Patient said her UA came back as gram neg radha.  In the hospital she said she had a kidney infection from E Coli strand.  She said she has a history Patient's right kidney has pain and her urine looks bad.  She has a history of kidney stones and infections.

## 2025-03-25 ENCOUNTER — HOSPITAL ENCOUNTER (OUTPATIENT)
Age: 50
Setting detail: OBSERVATION
Discharge: HOME | End: 2025-03-26
Attending: INTERNAL MEDICINE | Admitting: INTERNAL MEDICINE
Payer: COMMERCIAL

## 2025-03-25 ENCOUNTER — APPOINTMENT (OUTPATIENT)
Dept: GENERAL RADIOLOGY | Age: 50
End: 2025-03-25
Attending: INTERNAL MEDICINE
Payer: COMMERCIAL

## 2025-03-25 DIAGNOSIS — D50.8 OTHER IRON DEFICIENCY ANEMIA: ICD-10-CM

## 2025-03-25 LAB
ALBUMIN SERPL-MCNC: 4.1 G/DL (ref 3.4–5)
ALP SERPL-CCNC: 161 U/L (ref 40–129)
ALT SERPL-CCNC: 25 U/L (ref 10–40)
ANION GAP SERPL CALCULATED.3IONS-SCNC: 12 MMOL/L (ref 3–16)
AST SERPL-CCNC: 18 U/L (ref 15–37)
BASOPHILS # BLD: 0.1 K/UL (ref 0–0.2)
BASOPHILS NFR BLD: 0.7 %
BILIRUB DIRECT SERPL-MCNC: <0.1 MG/DL (ref 0–0.3)
BILIRUB INDIRECT SERPL-MCNC: ABNORMAL MG/DL (ref 0–1)
BILIRUB SERPL-MCNC: <0.2 MG/DL (ref 0–1)
BUN SERPL-MCNC: 27 MG/DL (ref 7–20)
CALCIUM SERPL-MCNC: 10 MG/DL (ref 8.3–10.6)
CHLORIDE SERPL-SCNC: 95 MMOL/L (ref 99–110)
CO2 SERPL-SCNC: 32 MMOL/L (ref 21–32)
CREAT SERPL-MCNC: 1.1 MG/DL (ref 0.6–1.1)
DEPRECATED RDW RBC AUTO: 21.7 % (ref 12.4–15.4)
EOSINOPHIL # BLD: 0.1 K/UL (ref 0–0.6)
EOSINOPHIL NFR BLD: 0.5 %
GFR SERPLBLD CREATININE-BSD FMLA CKD-EPI: 61 ML/MIN/{1.73_M2}
GLUCOSE BLD-MCNC: 156 MG/DL (ref 70–99)
GLUCOSE SERPL-MCNC: 145 MG/DL (ref 70–99)
HCT VFR BLD AUTO: 32.8 % (ref 36–48)
HGB BLD-MCNC: 10 G/DL (ref 12–16)
INR PPP: 0.97 (ref 0.85–1.15)
LYMPHOCYTES # BLD: 1.4 K/UL (ref 1–5.1)
LYMPHOCYTES NFR BLD: 9.2 %
MCH RBC QN AUTO: 22.9 PG (ref 26–34)
MCHC RBC AUTO-ENTMCNC: 30.4 G/DL (ref 31–36)
MCV RBC AUTO: 75.3 FL (ref 80–100)
MONOCYTES # BLD: 0.8 K/UL (ref 0–1.3)
MONOCYTES NFR BLD: 5.2 %
NEUTROPHILS # BLD: 12.8 K/UL (ref 1.7–7.7)
NEUTROPHILS NFR BLD: 84.4 %
PERFORMED ON: ABNORMAL
PLATELET # BLD AUTO: 367 K/UL (ref 135–450)
PMV BLD AUTO: 7.8 FL (ref 5–10.5)
POTASSIUM SERPL-SCNC: 4.1 MMOL/L (ref 3.5–5.1)
PROT SERPL-MCNC: 7.2 G/DL (ref 6.4–8.2)
PROTHROMBIN TIME: 13.1 SEC (ref 11.9–14.9)
RBC # BLD AUTO: 4.35 M/UL (ref 4–5.2)
SODIUM SERPL-SCNC: 139 MMOL/L (ref 136–145)
WBC # BLD AUTO: 15.1 K/UL (ref 4–11)

## 2025-03-25 PROCEDURE — 80048 BASIC METABOLIC PNL TOTAL CA: CPT

## 2025-03-25 PROCEDURE — 85610 PROTHROMBIN TIME: CPT

## 2025-03-25 PROCEDURE — 6360000002 HC RX W HCPCS: Performed by: INTERNAL MEDICINE

## 2025-03-25 PROCEDURE — 85025 COMPLETE CBC W/AUTO DIFF WBC: CPT

## 2025-03-25 PROCEDURE — 6370000000 HC RX 637 (ALT 250 FOR IP): Performed by: INTERNAL MEDICINE

## 2025-03-25 PROCEDURE — 74018 RADEX ABDOMEN 1 VIEW: CPT

## 2025-03-25 PROCEDURE — 80076 HEPATIC FUNCTION PANEL: CPT

## 2025-03-25 PROCEDURE — G0379 DIRECT REFER HOSPITAL OBSERV: HCPCS

## 2025-03-25 PROCEDURE — 96374 THER/PROPH/DIAG INJ IV PUSH: CPT

## 2025-03-25 PROCEDURE — 6360000002 HC RX W HCPCS: Performed by: NURSE PRACTITIONER

## 2025-03-25 PROCEDURE — 96375 TX/PRO/DX INJ NEW DRUG ADDON: CPT

## 2025-03-25 PROCEDURE — G0378 HOSPITAL OBSERVATION PER HR: HCPCS

## 2025-03-25 RX ORDER — ROSUVASTATIN CALCIUM 20 MG/1
20 TABLET, COATED ORAL DAILY
Status: DISCONTINUED | OUTPATIENT
Start: 2025-03-25 | End: 2025-03-25

## 2025-03-25 RX ORDER — POTASSIUM CHLORIDE 7.45 MG/ML
10 INJECTION INTRAVENOUS PRN
Status: DISCONTINUED | OUTPATIENT
Start: 2025-03-25 | End: 2025-03-26 | Stop reason: HOSPADM

## 2025-03-25 RX ORDER — SODIUM CHLORIDE 0.9 % (FLUSH) 0.9 %
10 SYRINGE (ML) INJECTION PRN
Status: DISCONTINUED | OUTPATIENT
Start: 2025-03-25 | End: 2025-03-26 | Stop reason: HOSPADM

## 2025-03-25 RX ORDER — METOPROLOL SUCCINATE 25 MG/1
25 TABLET, EXTENDED RELEASE ORAL DAILY
Status: DISCONTINUED | OUTPATIENT
Start: 2025-03-26 | End: 2025-03-26 | Stop reason: HOSPADM

## 2025-03-25 RX ORDER — MORPHINE SULFATE 2 MG/ML
2 INJECTION, SOLUTION INTRAMUSCULAR; INTRAVENOUS ONCE
Refills: 0 | Status: COMPLETED | OUTPATIENT
Start: 2025-03-26 | End: 2025-03-25

## 2025-03-25 RX ORDER — DIAZEPAM 10 MG/1
10 TABLET ORAL 3 TIMES DAILY PRN
COMMUNITY

## 2025-03-25 RX ORDER — PANTOPRAZOLE SODIUM 40 MG/1
40 TABLET, DELAYED RELEASE ORAL
Status: DISCONTINUED | OUTPATIENT
Start: 2025-03-26 | End: 2025-03-26 | Stop reason: HOSPADM

## 2025-03-25 RX ORDER — GLUCAGON 1 MG/ML
1 KIT INJECTION PRN
Status: DISCONTINUED | OUTPATIENT
Start: 2025-03-25 | End: 2025-03-26 | Stop reason: HOSPADM

## 2025-03-25 RX ORDER — SODIUM CHLORIDE 9 MG/ML
INJECTION, SOLUTION INTRAVENOUS PRN
Status: DISCONTINUED | OUTPATIENT
Start: 2025-03-25 | End: 2025-03-26 | Stop reason: HOSPADM

## 2025-03-25 RX ORDER — ACETAMINOPHEN 650 MG/1
650 SUPPOSITORY RECTAL EVERY 6 HOURS PRN
Status: DISCONTINUED | OUTPATIENT
Start: 2025-03-25 | End: 2025-03-26 | Stop reason: HOSPADM

## 2025-03-25 RX ORDER — MAGNESIUM SULFATE IN WATER 40 MG/ML
2000 INJECTION, SOLUTION INTRAVENOUS PRN
Status: DISCONTINUED | OUTPATIENT
Start: 2025-03-25 | End: 2025-03-26 | Stop reason: HOSPADM

## 2025-03-25 RX ORDER — INSULIN LISPRO 100 [IU]/ML
40 INJECTION, SOLUTION INTRAVENOUS; SUBCUTANEOUS
Status: DISCONTINUED | OUTPATIENT
Start: 2025-03-26 | End: 2025-03-26

## 2025-03-25 RX ORDER — DEXTROSE MONOHYDRATE 100 MG/ML
INJECTION, SOLUTION INTRAVENOUS CONTINUOUS PRN
Status: DISCONTINUED | OUTPATIENT
Start: 2025-03-25 | End: 2025-03-26 | Stop reason: HOSPADM

## 2025-03-25 RX ORDER — POTASSIUM CHLORIDE 1500 MG/1
40 TABLET, EXTENDED RELEASE ORAL PRN
Status: DISCONTINUED | OUTPATIENT
Start: 2025-03-25 | End: 2025-03-26 | Stop reason: HOSPADM

## 2025-03-25 RX ORDER — ACETAMINOPHEN 325 MG/1
650 TABLET ORAL EVERY 6 HOURS PRN
Status: DISCONTINUED | OUTPATIENT
Start: 2025-03-25 | End: 2025-03-26 | Stop reason: HOSPADM

## 2025-03-25 RX ORDER — SPIRONOLACTONE 25 MG/1
25 TABLET ORAL DAILY
Status: DISCONTINUED | OUTPATIENT
Start: 2025-03-26 | End: 2025-03-26 | Stop reason: HOSPADM

## 2025-03-25 RX ORDER — ONDANSETRON 2 MG/ML
4 INJECTION INTRAMUSCULAR; INTRAVENOUS EVERY 6 HOURS PRN
Status: DISCONTINUED | OUTPATIENT
Start: 2025-03-25 | End: 2025-03-26 | Stop reason: HOSPADM

## 2025-03-25 RX ORDER — INSULIN GLARGINE 100 [IU]/ML
40 INJECTION, SOLUTION SUBCUTANEOUS 2 TIMES DAILY
Status: DISCONTINUED | OUTPATIENT
Start: 2025-03-25 | End: 2025-03-26

## 2025-03-25 RX ADMIN — POLYETHYLENE GLYCOL-3350 AND ELECTROLYTES 4000 ML: 236; 6.74; 5.86; 2.97; 22.74 POWDER, FOR SOLUTION ORAL at 22:52

## 2025-03-25 RX ADMIN — ONDANSETRON 4 MG: 2 INJECTION, SOLUTION INTRAMUSCULAR; INTRAVENOUS at 22:48

## 2025-03-25 RX ADMIN — INSULIN GLARGINE 40 UNITS: 100 INJECTION, SOLUTION SUBCUTANEOUS at 22:05

## 2025-03-25 RX ADMIN — MORPHINE SULFATE 2 MG: 2 INJECTION, SOLUTION INTRAMUSCULAR; INTRAVENOUS at 23:47

## 2025-03-25 ASSESSMENT — PAIN DESCRIPTION - LOCATION
LOCATION: HIP;BACK;FOOT
LOCATION: BACK;FOOT

## 2025-03-25 ASSESSMENT — PAIN DESCRIPTION - ORIENTATION
ORIENTATION: RIGHT;LEFT
ORIENTATION: LEFT;RIGHT

## 2025-03-25 ASSESSMENT — ENCOUNTER SYMPTOMS
RESPIRATORY NEGATIVE: 1
GASTROINTESTINAL NEGATIVE: 1

## 2025-03-25 ASSESSMENT — PAIN SCALES - GENERAL
PAINLEVEL_OUTOF10: 9
PAINLEVEL_OUTOF10: 4

## 2025-03-25 ASSESSMENT — PAIN DESCRIPTION - PAIN TYPE: TYPE: CHRONIC PAIN

## 2025-03-25 NOTE — PROGRESS NOTES
Acceptance note  3/25/2025    Admission diagnosis:  Iron deficiency anemia  Nausea vomiting  Failed outpatient bowel prep    Brief history:  49-year-old lady with history of type 2 diabetes, CAD, hypertension, hyperlipidemia who was recently seen at GI clinic for evaluation of SHAQUILLE.  Attempts have been made to arrange for outpatient colonoscopy but failed to have adequate bowel preparation.  Decision was made with patient's agreement to admit for NG tube placement for bowel prep to be given overnight in view of colonoscopy tomorrow.

## 2025-03-26 ENCOUNTER — ANESTHESIA EVENT (OUTPATIENT)
Dept: ENDOSCOPY | Age: 50
End: 2025-03-26
Payer: COMMERCIAL

## 2025-03-26 ENCOUNTER — ANESTHESIA (OUTPATIENT)
Dept: ENDOSCOPY | Age: 50
End: 2025-03-26
Payer: COMMERCIAL

## 2025-03-26 VITALS
SYSTOLIC BLOOD PRESSURE: 128 MMHG | HEART RATE: 71 BPM | HEIGHT: 60 IN | BODY MASS INDEX: 49.28 KG/M2 | RESPIRATION RATE: 14 BRPM | WEIGHT: 251 LBS | OXYGEN SATURATION: 94 % | TEMPERATURE: 97.8 F | DIASTOLIC BLOOD PRESSURE: 73 MMHG

## 2025-03-26 LAB
BILIRUB UR QL STRIP.AUTO: NEGATIVE
CLARITY UR: CLEAR
COLOR UR: YELLOW
GLUCOSE BLD-MCNC: 101 MG/DL (ref 70–99)
GLUCOSE BLD-MCNC: 102 MG/DL (ref 70–99)
GLUCOSE BLD-MCNC: 94 MG/DL (ref 70–99)
GLUCOSE UR STRIP.AUTO-MCNC: NEGATIVE MG/DL
HGB UR QL STRIP.AUTO: NEGATIVE
KETONES UR STRIP.AUTO-MCNC: NEGATIVE MG/DL
LEUKOCYTE ESTERASE UR QL STRIP.AUTO: NEGATIVE
NITRITE UR QL STRIP.AUTO: NEGATIVE
PERFORMED ON: ABNORMAL
PERFORMED ON: ABNORMAL
PERFORMED ON: NORMAL
PH UR STRIP.AUTO: 6.5 [PH] (ref 5–8)
PROT UR STRIP.AUTO-MCNC: NEGATIVE MG/DL
SP GR UR STRIP.AUTO: 1.01 (ref 1–1.03)
UA DIPSTICK W REFLEX MICRO PNL UR: NORMAL
URN SPEC COLLECT METH UR: NORMAL
UROBILINOGEN UR STRIP-ACNC: 0.2 E.U./DL

## 2025-03-26 PROCEDURE — 81003 URINALYSIS AUTO W/O SCOPE: CPT

## 2025-03-26 PROCEDURE — 2700000000 HC OXYGEN THERAPY PER DAY

## 2025-03-26 PROCEDURE — 2709999900 HC NON-CHARGEABLE SUPPLY: Performed by: INTERNAL MEDICINE

## 2025-03-26 PROCEDURE — 6370000000 HC RX 637 (ALT 250 FOR IP): Performed by: INTERNAL MEDICINE

## 2025-03-26 PROCEDURE — 3609010600 HC COLONOSCOPY POLYPECTOMY SNARE/COLD BIOPSY: Performed by: INTERNAL MEDICINE

## 2025-03-26 PROCEDURE — 3609010300 HC COLONOSCOPY W/BIOPSY SINGLE/MULTIPLE: Performed by: INTERNAL MEDICINE

## 2025-03-26 PROCEDURE — 6360000002 HC RX W HCPCS: Performed by: INTERNAL MEDICINE

## 2025-03-26 PROCEDURE — C1889 IMPLANT/INSERT DEVICE, NOC: HCPCS | Performed by: INTERNAL MEDICINE

## 2025-03-26 PROCEDURE — 3700000001 HC ADD 15 MINUTES (ANESTHESIA): Performed by: INTERNAL MEDICINE

## 2025-03-26 PROCEDURE — 2500000003 HC RX 250 WO HCPCS: Performed by: INTERNAL MEDICINE

## 2025-03-26 PROCEDURE — G0378 HOSPITAL OBSERVATION PER HR: HCPCS

## 2025-03-26 PROCEDURE — 3700000000 HC ANESTHESIA ATTENDED CARE: Performed by: INTERNAL MEDICINE

## 2025-03-26 PROCEDURE — 96376 TX/PRO/DX INJ SAME DRUG ADON: CPT

## 2025-03-26 PROCEDURE — 7100000000 HC PACU RECOVERY - FIRST 15 MIN: Performed by: INTERNAL MEDICINE

## 2025-03-26 PROCEDURE — 6360000002 HC RX W HCPCS: Performed by: REGISTERED NURSE

## 2025-03-26 PROCEDURE — 7100000001 HC PACU RECOVERY - ADDTL 15 MIN: Performed by: INTERNAL MEDICINE

## 2025-03-26 PROCEDURE — 94760 N-INVAS EAR/PLS OXIMETRY 1: CPT

## 2025-03-26 PROCEDURE — 88305 TISSUE EXAM BY PATHOLOGIST: CPT

## 2025-03-26 PROCEDURE — 2580000003 HC RX 258: Performed by: REGISTERED NURSE

## 2025-03-26 PROCEDURE — 6360000002 HC RX W HCPCS: Performed by: NURSE PRACTITIONER

## 2025-03-26 DEVICE — WORKING LENGTH 235CM, WORKING CHANNEL 2.8MM
Type: IMPLANTABLE DEVICE | Status: FUNCTIONAL
Brand: RESOLUTION 360 CLIP

## 2025-03-26 RX ORDER — SODIUM CHLORIDE 9 MG/ML
INJECTION, SOLUTION INTRAVENOUS
Status: DISCONTINUED | OUTPATIENT
Start: 2025-03-26 | End: 2025-03-26 | Stop reason: SDUPTHER

## 2025-03-26 RX ORDER — INSULIN LISPRO 100 [IU]/ML
20 INJECTION, SOLUTION INTRAVENOUS; SUBCUTANEOUS
Status: DISCONTINUED | OUTPATIENT
Start: 2025-03-26 | End: 2025-03-26 | Stop reason: HOSPADM

## 2025-03-26 RX ORDER — PROPOFOL 10 MG/ML
INJECTION, EMULSION INTRAVENOUS
Status: DISCONTINUED | OUTPATIENT
Start: 2025-03-26 | End: 2025-03-26 | Stop reason: SDUPTHER

## 2025-03-26 RX ORDER — MORPHINE SULFATE 2 MG/ML
2 INJECTION, SOLUTION INTRAMUSCULAR; INTRAVENOUS ONCE
Status: DISCONTINUED | OUTPATIENT
Start: 2025-03-26 | End: 2025-03-26

## 2025-03-26 RX ORDER — LIDOCAINE HYDROCHLORIDE 20 MG/ML
INJECTION, SOLUTION INFILTRATION; PERINEURAL
Status: DISCONTINUED | OUTPATIENT
Start: 2025-03-26 | End: 2025-03-26 | Stop reason: SDUPTHER

## 2025-03-26 RX ORDER — INSULIN GLARGINE 100 [IU]/ML
40 INJECTION, SOLUTION SUBCUTANEOUS EVERY MORNING
Status: DISCONTINUED | OUTPATIENT
Start: 2025-03-26 | End: 2025-03-26 | Stop reason: HOSPADM

## 2025-03-26 RX ORDER — MORPHINE SULFATE 2 MG/ML
2 INJECTION, SOLUTION INTRAMUSCULAR; INTRAVENOUS ONCE
Refills: 0 | Status: COMPLETED | OUTPATIENT
Start: 2025-03-26 | End: 2025-03-26

## 2025-03-26 RX ADMIN — METOPROLOL SUCCINATE 25 MG: 25 TABLET, EXTENDED RELEASE ORAL at 08:29

## 2025-03-26 RX ADMIN — MORPHINE SULFATE 2 MG: 2 INJECTION, SOLUTION INTRAMUSCULAR; INTRAVENOUS at 04:23

## 2025-03-26 RX ADMIN — ONDANSETRON 4 MG: 2 INJECTION, SOLUTION INTRAMUSCULAR; INTRAVENOUS at 04:41

## 2025-03-26 RX ADMIN — SODIUM CHLORIDE: 9 INJECTION, SOLUTION INTRAVENOUS at 12:48

## 2025-03-26 RX ADMIN — PROPOFOL 50 MG: 10 INJECTION, EMULSION INTRAVENOUS at 12:56

## 2025-03-26 RX ADMIN — PROPOFOL 50 MG: 10 INJECTION, EMULSION INTRAVENOUS at 13:24

## 2025-03-26 RX ADMIN — PROPOFOL 50 MG: 10 INJECTION, EMULSION INTRAVENOUS at 12:59

## 2025-03-26 RX ADMIN — LIDOCAINE HYDROCHLORIDE 50 MG: 20 INJECTION, SOLUTION INFILTRATION; PERINEURAL at 12:54

## 2025-03-26 RX ADMIN — PROPOFOL 100 MG: 10 INJECTION, EMULSION INTRAVENOUS at 12:54

## 2025-03-26 RX ADMIN — SPIRONOLACTONE 25 MG: 25 TABLET ORAL at 08:29

## 2025-03-26 RX ADMIN — PANTOPRAZOLE SODIUM 40 MG: 40 TABLET, DELAYED RELEASE ORAL at 08:29

## 2025-03-26 RX ADMIN — PROPOFOL 140 MCG/KG/MIN: 10 INJECTION, EMULSION INTRAVENOUS at 12:55

## 2025-03-26 RX ADMIN — SODIUM CHLORIDE, PRESERVATIVE FREE 10 ML: 5 INJECTION INTRAVENOUS at 08:30

## 2025-03-26 ASSESSMENT — PAIN DESCRIPTION - ORIENTATION
ORIENTATION: RIGHT;LEFT
ORIENTATION: RIGHT;LEFT

## 2025-03-26 ASSESSMENT — PAIN SCALES - GENERAL
PAINLEVEL_OUTOF10: 9
PAINLEVEL_OUTOF10: 8

## 2025-03-26 ASSESSMENT — PAIN DESCRIPTION - PAIN TYPE: TYPE: CHRONIC PAIN

## 2025-03-26 ASSESSMENT — PAIN DESCRIPTION - DESCRIPTORS: DESCRIPTORS: BURNING;ACHING

## 2025-03-26 ASSESSMENT — PAIN DESCRIPTION - LOCATION
LOCATION: HIP;BACK;FOOT
LOCATION: BACK;HIP;FOOT

## 2025-03-26 ASSESSMENT — PAIN DESCRIPTION - ONSET: ONSET: ON-GOING

## 2025-03-26 ASSESSMENT — ENCOUNTER SYMPTOMS: SHORTNESS OF BREATH: 1

## 2025-03-26 ASSESSMENT — PAIN DESCRIPTION - FREQUENCY: FREQUENCY: CONTINUOUS

## 2025-03-26 NOTE — PROGRESS NOTES
4 Eyes Skin Assessment     NAME:  Cassandra Bowser  YOB: 1975  MEDICAL RECORD NUMBER:  [id number]    The patient is being assessed for  Admission    I agree that at least one RN has performed a thorough Head to Toe Skin Assessment on the patient. ALL assessment sites listed below have been assessed.      Areas assessed by both nurses:    Head, Face, Ears, Shoulders, Back, Chest, Arms, Elbows, Hands, Sacrum. Buttock, Coccyx, Ischium, Legs. Feet and Heels, and Under Medical Devices         Does the Patient have a Wound? No noted wound(s)       Fausto Prevention initiated by RN: No  Wound Care Orders initiated by RN: No    Pressure Injury (Stage 3,4, Unstageable, DTI, NWPT, and Complex wounds) if present, place Wound referral order by RN under : No    New Ostomies, if present place, Ostomy referral order under : No     Nurse 1 eSignature: Electronically signed by Katelyn Irvin RN on 3/26/25 at 7:23 AM EDT    **SHARE this note so that the co-signing nurse can place an eSignature**    Nurse 2 eSignature: Electronically signed by Annita Pierson RN on 3/26/25 at 7:32 AM EDT

## 2025-03-26 NOTE — PROGRESS NOTES
Patient discharge order noted. Patient alert and oriented. PIV removed without issues. Patient provided with discharge papers. Patient discharged to home.

## 2025-03-26 NOTE — H&P
Gastroenterology Note             Pre-operative History and Physical    Patient: Cassandra Bowser  : 1975  CSN:     History Obtained From:  patient and/or guardian.     HISTORY OF PRESENT ILLNESS:    The patient is a 49 y.o. female  here for /colonoscopy.   This a very pleasant 49-year-old female who has been found to have some iron deficiency anemia  She is a patient of our colleague Dr. Madrid  She was unable to complete her outpatient preparation so she was admitted she was able to complete a preparation overnight and so we are helping him by doing a colonoscopy today    Past Medical History:    Past Medical History:   Diagnosis Date    Allergic     Anemia     Asthma     CAD (coronary artery disease)     Stent LAD    CHF (congestive heart failure) (HCC)     Chronic back pain     back    Chronic kidney disease     kidney stones    COPD (chronic obstructive pulmonary disease) (HCC)     Diabetes mellitus (HCC)     gestional diabetes only    GERD (gastroesophageal reflux disease)     Hyperlipidemia     Hypertension     Narcolepsy     Pancreatitis 10/01/2011    Pneumonia 2008    Psychiatric problem     anxiewty, depression    Sleep apnea treated with nocturnal bilevel positive airway pressure (BPAP)     Ulcer     stomach     Past Surgical History:    Past Surgical History:   Procedure Laterality Date    CARDIAC CATHETERIZATION  2010, 2011    Dr. Wahl     SECTION      CHOLECYSTECTOMY, LAPAROSCOPIC  10/5/2011    with intraoperative cholangiogram    EYE SURGERY      age 1    HYSTERECTOMY (CERVIX STATUS UNKNOWN)      TUBAL LIGATION      UPPER GASTROINTESTINAL ENDOSCOPY N/A 2025    ESOPHAGOGASTRODUODENOSCOPY BIOPSY performed by Paxton Madrid MD at [facility] END[facility]COPY     Medications Prior to Admission:   No current facility-administered medications on file prior to encounter.     Current Outpatient Medications on File Prior to Encounter   Medication Sig Dispense Refill    diazePAM (VALIUM) 
hours.  No results for input(s): \"NA\", \"K\", \"CL\", \"CO2\", \"BUN\", \"CREATININE\", \"CALCIUM\", \"PHOS\" in the last 72 hours.    Invalid input(s): \"MAGNES\"  No results for input(s): \"AST\", \"ALT\", \"BILIDIR\", \"BILITOT\", \"ALKPHOS\" in the last 72 hours.  No results for input(s): \"PROBNP\", \"TROPONINI\", \"INR\" in the last 72 hours.  No results for input(s): \"NITRU\", \"COLORU\", \"PHUR\", \"LABCAST\", \"WBCUA\", \"RBCUA\", \"MUCUS\", \"SPECGRAV\", \"LEUKOCYTESUR\", \"BLOODU\", \"GLUCOSEU\", \"KETUA\" in the last 72 hours.    No orders to display       ASSESSMENT / PLAN:     Iron deficiency anemia  Failed outpatient bowel prep 2/2  Nausea and vomiting  Diabetes, type 2, insulin dependent  CAD  GERD  HTN    - will check BMP, CBC, A1c, and UA  - place NGT  - golytely prep overnight   - home meds via NG  - half-dose insulin while NPO  - GI consulted, Dr. Madrid      Dispo: Admit to Observation med/surg.  Expected LOS less than 2 midnights.  PPx:  SCDs   PT/OT:  Not indicated   Code status:  Full Code         Chris Arguello MD  Hospitalist

## 2025-03-26 NOTE — PROGRESS NOTES
Admitted to PACU following GI procedure.   (Colonoscopy/ polypectomies by Dr Terrell) Report received from endo nurse and CRNA. Abdomen soft. No rectal bleeding or drainage. Sleeping but arouses to name.Denies pain or nausea. VSS. Will continue to monitor

## 2025-03-26 NOTE — ANESTHESIA POSTPROCEDURE EVALUATION
Department of Anesthesiology  Postprocedure Note    Patient: Cassandra Bowser  MRN: [id number]  YOB: 1975  Date of evaluation: 3/26/2025    Procedure Summary       Date: 03/26/25 Room / Location: [place] [address] / [facility]    Anesthesia Start: 1248 Anesthesia Stop: 1344    Procedures:       COLONOSCOPY BIOPSY      COLONOSCOPY POLYPECTOMY SNARE/BIOPSY Diagnosis:       Other iron deficiency anemia      (Other iron deficiency anemia [D50.8])    Surgeons: Kieran Terrell MD Responsible Provider: Pako Mclaughlin MD    Anesthesia Type: MAC ASA Status: 3            Anesthesia Type: No value filed.    Wing Phase I: Wing Score: 8    Wing Phase II:      Anesthesia Post Evaluation    Patient location during evaluation: PACU  Patient participation: complete - patient participated  Level of consciousness: awake  Airway patency: patent  Nausea & Vomiting: no nausea and no vomiting  Cardiovascular status: hemodynamically stable  Respiratory status: acceptable  Hydration status: stable  Multimodal analgesia pain management approach  Pain management: adequate    No notable events documented.

## 2025-03-26 NOTE — ANESTHESIA PRE PROCEDURE
Department of Anesthesiology  Preprocedure Note       Name:  Cassandra Bowser   Age:  49 y.o.  :  1975                                          MRN:  [id number]         Date:  3/26/2025      Surgeon: Surgeon(s):  Kieran Terrell MD    Procedure: Procedure(s):  COLONOSCOPY DIAGNOSTIC    Medications prior to admission:   Prior to Admission medications    Medication Sig Start Date End Date Taking? Authorizing Provider   diazePAM (VALIUM) 10 MG tablet Take 1 tablet by mouth 3 times daily as needed for Anxiety. Max Daily Amount: 30 mg   Yes Kinga Verde MD   Insulin Glargine, 2 Unit Dial, (TOUJEO MAX SOLOSTAR) 300 UNIT/ML concentrated injection pen Take upto 80 units daily start with 50 units daily 3/11/25  Yes Ange Tenorio MD   torsemide (DEMADEX) 20 MG tablet Take 3 tablets by mouth daily  Patient taking differently: Take 2 tablets by mouth in the morning and 2 tablets in the evening. 25  Yes Isa Rangel APRN - CNP   tiotropium (SPIRIVA RESPIMAT) 2.5 MCG/ACT AERS inhaler Inhale 2 puffs into the lungs daily 25  Yes Dmitry Chew MD   ipratropium 0.5 mg-albuterol 2.5 mg (DUONEB) 0.5-2.5 (3) MG/3ML SOLN nebulizer solution Inhale 3 mLs into the lungs every 4 hours 25  Yes Dmitry Chew MD   predniSONE (DELTASONE) 10 MG tablet Take 1 tablet by mouth daily 25 Yes Dmitry Chew MD   sucralfate (CARAFATE) 1 GM tablet Take 1 tablet by mouth 3 times daily (before meals) 25  Yes Vinnie Cardona MD   pantoprazole (PROTONIX) 40 MG tablet Take 1 tablet by mouth 2 times daily (before meals) 25  Yes Vinnie Cardona MD   guaiFENesin (MUCINEX) 600 MG extended release tablet Take 2 tablets by mouth 2 times daily   Yes Kinga Verde MD   aspirin 81 MG EC tablet Take 1 tablet by mouth daily 25  Yes Acacia Andre MD   metoprolol succinate (TOPROL XL) 25 MG extended release tablet Take 1 tablet by mouth daily 25  Yes Acacia Andre MD

## 2025-03-26 NOTE — PROGRESS NOTES
HEMOCLIP    A HEMOCLIP METALLIC CLIPPING DEVICE HAS BEEN PLACED DURING YOUR PROCEDURE.    THIS DEVICE WILL NATURALLY PASS IN 7-14 DAYS WHEN MAKING A BOWEL MOVEMENT

## 2025-03-26 NOTE — PROGRESS NOTES
Arrived to endo preop with O2 at 2.5 l/NC. Pt dozes off and on. Awakens spontaneously. Oriented. Able to sign consent.  at bedside.  Teaching / education initiated regarding perioperative experience, expectations, and pain management during stay. Patient verbalized understanding.

## 2025-03-26 NOTE — PROGRESS NOTES
Hospitalist Progress Note      PCP: Cody Solano MD    Date of Admission: 3/25/2025    LOS: 0    Chief Complaint: No chief complaint on file.      Case Summary:   49-year-old lady with history of type 2 diabetes, CAD, hypertension, hyperlipidemia who was recently seen at GI clinic for evaluation of SHAQUILLE.  She had failed multiple outpatient bowel prep attempts for colonoscopy.  Patient admitted for bowel prep for colonoscopy today.      Active Hospital Problems    Diagnosis Date Noted    Iron deficiency anemia [D50.9] 08/11/2015         Principal Problem:    Iron deficiency anemia: Stable hemoglobin.  - Patient planned for colonoscopy today.  If satisfactory, to consider discharge planning later today.  - Continue Protonix  - GI following with recommendation    Active Problems:    Essential hypertension: Stable on metoprolol and Aldactone    Hypercholesteremia: Stable.    Type 2 diabetes mellitus without complication (HCC): On sliding scale insulin with glargine    Morbid obesity      Medications:  Reviewed  Infusion Medications    sodium chloride      dextrose       Scheduled Medications    insulin lispro  20 Units SubCUTAneous TID WC    insulin glargine  40 Units SubCUTAneous QAM    metoprolol succinate  25 mg Oral Daily    pantoprazole  40 mg Oral BID AC    spironolactone  25 mg Oral Daily     PRN Meds: sodium chloride flush, sodium chloride, potassium chloride **OR** potassium alternative oral replacement **OR** potassium chloride, magnesium sulfate, ondansetron, melatonin, acetaminophen **OR** acetaminophen, dextrose bolus **OR** dextrose bolus, glucagon (rDNA), dextrose      DVT Prophylaxis: SCD  Diet: Diet NPO Exceptions are: Ice Chips, Sips of Water with Meds, Sips of Clear Liquids  Code Status: Full Code    Dispo: Anticipate discharge later today or tomorrow    ____________________________________________________________________________    Subjective:   Overnight Events:   Awaiting

## 2025-03-26 NOTE — PLAN OF CARE
Shift assessment completed. Routine vitals stable. Scheduled medications given. Patient is awake, alert and oriented. Respirations are easy and unlabored. Patient does not appear to be in distress, resting comfortably at this time. Call light within reach.    Problem: Chronic Conditions and Co-morbidities  Goal: Patient's chronic conditions and co-morbidity symptoms are monitored and maintained or improved  3/26/2025 1022 by Miroslava Crews RN  Outcome: Progressing  Flowsheets (Taken 3/26/2025 1019)  Care Plan - Patient's Chronic Conditions and Co-Morbidity Symptoms are Monitored and Maintained or Improved:   Collaborate with multidisciplinary team to address chronic and comorbid conditions and prevent exacerbation or deterioration   Update acute care plan with appropriate goals if chronic or comorbid symptoms are exacerbated and prevent overall improvement and discharge   Monitor and assess patient's chronic conditions and comorbid symptoms for stability, deterioration, or improvement  3/26/2025 0128 by Katelyn Irvin RN  Outcome: Progressing     Problem: Pain  Goal: Verbalizes/displays adequate comfort level or baseline comfort level  3/26/2025 1022 by Miroslava Crews RN  Outcome: Progressing  3/26/2025 0128 by Katelyn Irvin RN  Outcome: Progressing     Problem: Safety - Adult  Goal: Free from fall injury  3/26/2025 1022 by Miroslava Crews RN  Outcome: Progressing  3/26/2025 0128 by Katelyn Irvin RN  Outcome: Progressing

## 2025-03-27 NOTE — DISCHARGE SUMMARY
Hospital Medicine Discharge Summary    Patient ID: Cassandra Bowser      Patient's PCP: Cody Solano MD    Admit Date: 3/25/2025     Discharge Date: 3/26/2025      Admitting Provider: Ellie Osei MD     Discharge Provider: Ellie Osei MD     Discharge Diagnoses:       Active Hospital Problems    Diagnosis     Type 2 diabetes mellitus without complication (HCC) [E11.9]     Iron deficiency anemia [D50.9]     Morbid obesity [E66.01]     Hypercholesteremia [E78.00]     Essential hypertension [I10]        The patient was seen and examined on day of discharge and this discharge summary is in conjunction with any daily progress note from day of discharge.    Hospital Course:   The patient is a 49-year-old lady with history of type 2 diabetes, CAD, hypertension, hyperlipidemia who was recently seen at GI clinic for evaluation of SHAQUILLE.  She had failed multiple outpatient bowel prep attempts for colonoscopy.  Patient admitted for bowel prep for colonoscopy done 3/26/2025 with results as noted below:  Impression:         -  Decreased sphincter tone found on digital rectal exam.         -  Two 10 to 12 mm polyps in the ascending colon, removed with a            cold snare.  Resected and retrieved.         -  Two 10 to 11 mm polyps in the transverse colon, removed with a            cold snare.  Resected and retrieved.         -  Seven 10 to 20 mm polyps in the descending colon, removed with a            cold snare.  Resected and retrieved.  Clip was placed.  Clip            : [employer].         -  Two 4 to 5 mm polyps in the sigmoid colon, removed with a cold            biopsy forceps.  Resected and retrieved.         -  Diverticulosis in the sigmoid colon.         -  Internal hemorrhoids.  Recommendation:         -  Resume regular diet.         -  Await pathology results.         -  Repeat colonoscopy in 2 years for surveillance based on pathology            results.

## 2025-03-28 NOTE — PROGRESS NOTES
[facility]   Congestive Heart Failure    Primary Care Doctor:  Cody Solano MD  Primary Cardiologist: julian       Chief Complaint:  chf    History of Present Illness:  Cassandra Bowser is a 49 y.o. female with CAD, PCI, COPD, diabetes mellitus, hypertension, dyslipidemia, narcolepsy, obesity who presents today for CHF f/u. At last OV: 4lb wt gain hypotension  Stop lisinopril   Change torsemide to 2 pills with breakfast and lunch until your wt is 248 on your scale then go back to 3plls a day. If your wt goes back up then go back to 2 twice a day  Colonoscopy 3/26/25- multiple polyps removed, diverticulosis  Today she has gained 7lb on our scale since her last OV with me, but wt is stable on her scale. She is taking 80mg of torsemide BID, labs last week reviewed -ok. Her BP is better off the ACE and she denies chest pain,  palpitations, orthopnea, PND, exertional chest pressure/discomfort, early saiety, syncope. Her edema is improved    Today's home wt: 252, 254    Baseline Weight: 245  Wt Readings from Last 3 Encounters:   03/31/25 116.6 kg (257 lb)   03/26/25 113.9 kg (251 lb)   03/11/25 113.4 kg (250 lb)         EF: 55-60%  Cardiac Imaging:  Echo 1/14/2025:    Left Ventricle: Normal left ventricular systolic function with a visually estimated EF of 55 - 60%. Left ventricle size is normal. Normal wall thickness. Unable to assess wall motion.    Right Ventricle: Not well visualized.    Image quality is poor. Contrast used: Lumason. Technically difficult study due to patient's body habitus.     CTA 1/13/2025:  IMPRESSION:  1. No evidence of pulmonary embolism.  2. Mosaic attenuation within both lungs, which may be related to expiratory  phase of imaging versus air trapping or small vessel/airways disease.  3. The heart and pericardium demonstrate no acute abnormality.  Coronary artery  calcifications.  There is no acute abnormality of the thoracic aorta.     Cardiac cath [facility]

## 2025-03-31 ENCOUNTER — OFFICE VISIT (OUTPATIENT)
Dept: PULMONOLOGY | Age: 50
End: 2025-03-31
Payer: COMMERCIAL

## 2025-03-31 ENCOUNTER — OFFICE VISIT (OUTPATIENT)
Dept: CARDIOLOGY CLINIC | Age: 50
End: 2025-03-31
Payer: COMMERCIAL

## 2025-03-31 VITALS
OXYGEN SATURATION: 96 % | HEART RATE: 69 BPM | SYSTOLIC BLOOD PRESSURE: 122 MMHG | DIASTOLIC BLOOD PRESSURE: 70 MMHG | BODY MASS INDEX: 50.58 KG/M2 | WEIGHT: 257.6 LBS | HEIGHT: 60 IN

## 2025-03-31 VITALS
HEART RATE: 72 BPM | OXYGEN SATURATION: 95 % | WEIGHT: 257 LBS | DIASTOLIC BLOOD PRESSURE: 60 MMHG | BODY MASS INDEX: 50.45 KG/M2 | SYSTOLIC BLOOD PRESSURE: 116 MMHG | HEIGHT: 60 IN

## 2025-03-31 DIAGNOSIS — J96.11 CHRONIC RESPIRATORY FAILURE WITH HYPOXIA AND HYPERCAPNIA: ICD-10-CM

## 2025-03-31 DIAGNOSIS — I50.32 CHRONIC DIASTOLIC CONGESTIVE HEART FAILURE (HCC): Primary | ICD-10-CM

## 2025-03-31 DIAGNOSIS — I25.10 CORONARY ARTERY DISEASE INVOLVING NATIVE CORONARY ARTERY OF NATIVE HEART WITHOUT ANGINA PECTORIS: ICD-10-CM

## 2025-03-31 DIAGNOSIS — D50.0 IRON DEFICIENCY ANEMIA DUE TO CHRONIC BLOOD LOSS: ICD-10-CM

## 2025-03-31 DIAGNOSIS — R06.09 DYSPNEA ON EXERTION: ICD-10-CM

## 2025-03-31 DIAGNOSIS — J96.12 CHRONIC RESPIRATORY FAILURE WITH HYPOXIA AND HYPERCAPNIA: ICD-10-CM

## 2025-03-31 DIAGNOSIS — F17.290 OTHER TOBACCO PRODUCT NICOTINE DEPENDENCE, UNCOMPLICATED: ICD-10-CM

## 2025-03-31 DIAGNOSIS — I95.2 HYPOTENSION DUE TO DRUGS: ICD-10-CM

## 2025-03-31 DIAGNOSIS — I50.32 CHRONIC DIASTOLIC HEART FAILURE (HCC): ICD-10-CM

## 2025-03-31 DIAGNOSIS — J44.9 COPD, SEVERITY TO BE DETERMINED (HCC): Primary | ICD-10-CM

## 2025-03-31 PROCEDURE — G8417 CALC BMI ABV UP PARAM F/U: HCPCS | Performed by: NURSE PRACTITIONER

## 2025-03-31 PROCEDURE — 3078F DIAST BP <80 MM HG: CPT | Performed by: INTERNAL MEDICINE

## 2025-03-31 PROCEDURE — 3074F SYST BP LT 130 MM HG: CPT | Performed by: NURSE PRACTITIONER

## 2025-03-31 PROCEDURE — G8427 DOCREV CUR MEDS BY ELIG CLIN: HCPCS | Performed by: NURSE PRACTITIONER

## 2025-03-31 PROCEDURE — 99214 OFFICE O/P EST MOD 30 MIN: CPT | Performed by: NURSE PRACTITIONER

## 2025-03-31 PROCEDURE — 99214 OFFICE O/P EST MOD 30 MIN: CPT | Performed by: INTERNAL MEDICINE

## 2025-03-31 PROCEDURE — 3017F COLORECTAL CA SCREEN DOC REV: CPT | Performed by: INTERNAL MEDICINE

## 2025-03-31 PROCEDURE — 3023F SPIROM DOC REV: CPT | Performed by: INTERNAL MEDICINE

## 2025-03-31 PROCEDURE — G8417 CALC BMI ABV UP PARAM F/U: HCPCS | Performed by: INTERNAL MEDICINE

## 2025-03-31 PROCEDURE — 1036F TOBACCO NON-USER: CPT | Performed by: NURSE PRACTITIONER

## 2025-03-31 PROCEDURE — 3078F DIAST BP <80 MM HG: CPT | Performed by: NURSE PRACTITIONER

## 2025-03-31 PROCEDURE — 1036F TOBACCO NON-USER: CPT | Performed by: INTERNAL MEDICINE

## 2025-03-31 PROCEDURE — 3074F SYST BP LT 130 MM HG: CPT | Performed by: INTERNAL MEDICINE

## 2025-03-31 PROCEDURE — G8427 DOCREV CUR MEDS BY ELIG CLIN: HCPCS | Performed by: INTERNAL MEDICINE

## 2025-03-31 PROCEDURE — G2211 COMPLEX E/M VISIT ADD ON: HCPCS | Performed by: INTERNAL MEDICINE

## 2025-03-31 PROCEDURE — 3017F COLORECTAL CA SCREEN DOC REV: CPT | Performed by: NURSE PRACTITIONER

## 2025-03-31 RX ORDER — PANTOPRAZOLE SODIUM 40 MG/1
40 TABLET, DELAYED RELEASE ORAL
Qty: 30 TABLET | Refills: 3 | Status: SHIPPED | OUTPATIENT
Start: 2025-03-31

## 2025-03-31 NOTE — PATIENT INSTRUCTIONS
Visit Summary and Instructions:     Action Items for you:   Weigh yourself every day in the morning after urination, call Isa if your wt increases 2-3lb in one day or 5lb in one week -- [phone]  Limit sodium to 3000mg/day and fluids to 2L or 64oz/day.   3.   Check your blood pressure daily and call if the top number is under 90 or over 165       Medications started or adjusted today: continue torsemide 80mg twice a day, if you lose more than 3lb or feel dizzy then decrease to 60mg twice a day        Tests ordered today:   Labs in 2-3 weeks      Follow Up:   Schedule an appointment with Isa for 8 weeks

## 2025-03-31 NOTE — PROGRESS NOTES
[facility]  Follow Up Note  Dmitry Chew MD       Cassandra Bowser   YOB: 1975    Date of Visit:  3/31/2025    Assessment/Plan:  1. COPD, severity to be determined (HCC)  She was sent from the office to the ER at her last visit  She was found to have acute on chronic respiratory failure and is now on BiPAP with sleep      Plan:  Symbicort 160/4.5 2 puffs bid  Spiriva   Duoneb HHN BID-TID  Prednisone 10 mg per day ==> 5 mg per day.  Her Endocrinologist wants to complete the prednisone wean.   She is not wheezing.    2. Chronic hypercapnic and hypoxemic   On O2 2lpm 24 h/day  I reviewed compliance data. She is using the device nightly and has good effect.     3.  Morbid obesity, unspecified obesity type  Obesity is a contributor to her symptoms    4. Chronic GERD  This is a big problem for her and maybe responsible for the sensation she gets in her throat that seems to drive her to taking Prednisone    5. Anemia  She had UGI and colonoscopy. Polyps removed  She is getting iron  Hb is still around 10  Kidney numbers are better    6. Tobacco abuse  Nonsmoker since 1/13/25  She also quit caffeine. She was drinking up to 6 Rockstars a day  Decrease her Nicotine patch to 14 mg/24hr    F/U here 3 month     Chief Complaint   Patient presents with    1 Month Follow-Up    COPD       HPI  The patient presents with a chief complaint of moderate shortness of breath related to moderate COPD of many years duration. He has mild associated cough. Exertion is a modifying factor.    She had colonoscopy and polyp removal. She tolerated the procedure well. She is hoping that will help her iron deficiency anemia. She is feeling better.       Review of Systems  No Chest pain, Nausea or vomiting reported      History  I have reviewed past medical, surgical, social and family history. This is documented elsewhere in the medical record.     Physical Exam:  Cushingoid

## 2025-04-01 ENCOUNTER — TELEPHONE (OUTPATIENT)
Dept: PULMONOLOGY | Age: 50
End: 2025-04-01

## 2025-04-01 DIAGNOSIS — Z72.0 TOBACCO ABUSE: Primary | ICD-10-CM

## 2025-04-01 DIAGNOSIS — R06.02 SOB (SHORTNESS OF BREATH): ICD-10-CM

## 2025-04-01 DIAGNOSIS — J96.11 CHRONIC HYPOXEMIC RESPIRATORY FAILURE: ICD-10-CM

## 2025-04-01 DIAGNOSIS — J44.9 COPD, SEVERITY TO BE DETERMINED (HCC): ICD-10-CM

## 2025-04-01 RX ORDER — PREDNISONE 5 MG/1
5 TABLET ORAL DAILY
COMMUNITY
End: 2025-04-01 | Stop reason: SDUPTHER

## 2025-04-01 RX ORDER — NICOTINE 21 MG/24HR
1 PATCH, TRANSDERMAL 24 HOURS TRANSDERMAL EVERY 24 HOURS
COMMUNITY
End: 2025-04-01 | Stop reason: SDUPTHER

## 2025-04-01 NOTE — TELEPHONE ENCOUNTER
Per office note:    Prednisone 10 mg per day ==> 5 mg per day.     Decrease her Nicotine patch to 14 mg/24hr     Please send to [facility]

## 2025-04-02 RX ORDER — PREDNISONE 5 MG/1
5 TABLET ORAL DAILY
Qty: 30 TABLET | Refills: 5 | Status: SHIPPED | OUTPATIENT
Start: 2025-04-02

## 2025-04-02 RX ORDER — NICOTINE 21 MG/24HR
1 PATCH, TRANSDERMAL 24 HOURS TRANSDERMAL EVERY 24 HOURS
Qty: 30 PATCH | Refills: 5 | Status: SHIPPED | OUTPATIENT
Start: 2025-04-02

## 2025-04-03 ENCOUNTER — PATIENT MESSAGE (OUTPATIENT)
Dept: ENDOCRINOLOGY | Age: 50
End: 2025-04-03

## 2025-04-03 ENCOUNTER — TELEPHONE (OUTPATIENT)
Dept: ENDOCRINOLOGY | Age: 50
End: 2025-04-03
Payer: COMMERCIAL

## 2025-04-03 DIAGNOSIS — R80.9 CONTROLLED TYPE 2 DIABETES MELLITUS WITH MICROALBUMINURIA, WITH LONG-TERM CURRENT USE OF INSULIN (HCC): Primary | ICD-10-CM

## 2025-04-03 DIAGNOSIS — E11.29 CONTROLLED TYPE 2 DIABETES MELLITUS WITH MICROALBUMINURIA, WITH LONG-TERM CURRENT USE OF INSULIN (HCC): Primary | ICD-10-CM

## 2025-04-03 DIAGNOSIS — E66.01 MORBID OBESITY: ICD-10-CM

## 2025-04-03 DIAGNOSIS — Z79.4 CONTROLLED TYPE 2 DIABETES MELLITUS WITH MICROALBUMINURIA, WITH LONG-TERM CURRENT USE OF INSULIN (HCC): Primary | ICD-10-CM

## 2025-04-03 PROCEDURE — 95251 CONT GLUC MNTR ANALYSIS I&R: CPT | Performed by: INTERNAL MEDICINE

## 2025-04-03 NOTE — TELEPHONE ENCOUNTER
Dexcom download attached for review    Novolog 40 units three times a day with meals    Toujeo 40 units twice a day (this was changed from 80 units once a day a week ago when a different MD recommended it to try to better control blood sugar)

## 2025-04-03 NOTE — TELEPHONE ENCOUNTER
Diabetes Continuous Glucose Monitoring Report         Reason for Study:     - improve diabetic control without risk of hypoglycemia       Current Medication regimen:   Basal Bolus insulin regimen      CGMS Report     CGMS data collection was performed on 4/3/2025  Patient provided information on her  diet, activities and insulin dosing  during this period.   Data was available for 7+ days     Sensor Data Report:   - 12 AM to 6 AM: Overnight blood glucose pattern shows stable glycemia  - 6   AM to 10 AM:  Post breakfast hyperglycemia is noted  --10AM to 5 PM : No hypoglycemia observed during this time.  - 5   PM to 8 PM: Post meal hyperglycemia is noted       Average reading 185 mg/dL     Standard Dev/coefficient of variation  72   % of time <70 mg/dL   0 %   % of time >180  mg/dL 46  %   % of time within range  54  %  Number of hypoglycemia episodes noted: 0     Impression:   CGMS shows stable glycemia overnight with postprandial hyperglycemia     Recommendation:      Patient was advised to make the following changes in her diabetic regimen  Please meal boluses  Take meal boluses 10 minutes prior to eating

## 2025-04-04 DIAGNOSIS — E11.29 CONTROLLED TYPE 2 DIABETES MELLITUS WITH MICROALBUMINURIA, WITH LONG-TERM CURRENT USE OF INSULIN (HCC): Primary | ICD-10-CM

## 2025-04-04 DIAGNOSIS — R80.9 CONTROLLED TYPE 2 DIABETES MELLITUS WITH MICROALBUMINURIA, WITH LONG-TERM CURRENT USE OF INSULIN (HCC): Primary | ICD-10-CM

## 2025-04-04 DIAGNOSIS — Z79.4 CONTROLLED TYPE 2 DIABETES MELLITUS WITH MICROALBUMINURIA, WITH LONG-TERM CURRENT USE OF INSULIN (HCC): Primary | ICD-10-CM

## 2025-04-04 NOTE — TELEPHONE ENCOUNTER
Pt called in for lantus    [facility]/pharmacy #[id number] - [place], [place] - [address] - P [phone] - F [phone]  [address] [facility] [address]  Phone: [phone]  Fax: [phone]

## 2025-04-07 ENCOUNTER — TELEPHONE (OUTPATIENT)
Dept: ENDOCRINOLOGY | Age: 50
End: 2025-04-07

## 2025-04-07 NOTE — TELEPHONE ENCOUNTER
Pt was calling to follow up on her prev mychart message and to add that her pulmonology Dr is ok w/ Dr Tenorio taking over the Prednisone

## 2025-04-08 NOTE — TELEPHONE ENCOUNTER
Medication:   Requested Prescriptions     Pending Prescriptions Disp Refills    insulin glargine (LANTUS SOLOSTAR) 100 UNIT/ML injection pen 30 mL 3     Sig: Inject 40 units twice a day       Last Filled:      Patient Phone Number: [phone] (home)     Last appt: 3/11/2025   Next appt: 5/21/25    Last Labs DM:   Lab Results   Component Value Date/Time    LABA1C 6.0 01/21/2025 05:21 AM

## 2025-04-09 DIAGNOSIS — R80.9 CONTROLLED TYPE 2 DIABETES MELLITUS WITH MICROALBUMINURIA, WITH LONG-TERM CURRENT USE OF INSULIN (HCC): ICD-10-CM

## 2025-04-09 DIAGNOSIS — E11.29 CONTROLLED TYPE 2 DIABETES MELLITUS WITH MICROALBUMINURIA, WITH LONG-TERM CURRENT USE OF INSULIN (HCC): ICD-10-CM

## 2025-04-09 DIAGNOSIS — Z79.4 CONTROLLED TYPE 2 DIABETES MELLITUS WITH MICROALBUMINURIA, WITH LONG-TERM CURRENT USE OF INSULIN (HCC): ICD-10-CM

## 2025-04-09 RX ORDER — INSULIN GLARGINE 100 [IU]/ML
INJECTION, SOLUTION SUBCUTANEOUS
Qty: 30 ML | Refills: 3 | Status: SHIPPED | OUTPATIENT
Start: 2025-04-09 | End: 2025-04-10

## 2025-04-09 NOTE — TELEPHONE ENCOUNTER
Yes she can take prednisone but she has to be very careful as prednisone is going to make her blood sugars go up so she has to keep a very close eye on it and will adjust her diabetes medication based on her response to prednisone

## 2025-04-10 RX ORDER — PANTOPRAZOLE SODIUM 40 MG/1
80 TABLET, DELAYED RELEASE ORAL
Qty: 180 TABLET | Refills: 1 | OUTPATIENT
Start: 2025-04-10

## 2025-04-10 RX ORDER — INSULIN GLARGINE 100 [IU]/ML
INJECTION, SOLUTION SUBCUTANEOUS
Qty: 25 ADJUSTABLE DOSE PRE-FILLED PEN SYRINGE | Refills: 0 | Status: SHIPPED | OUTPATIENT
Start: 2025-04-10

## 2025-04-10 NOTE — PATIENT INSTRUCTIONS
We will check labs and then we will decide on [place]    Left Heart Cath Patient Pre-procedure Instructions    Do NOT eat or drink anything after midnight morning of procedure    MEDICATIONS:  Your medications have been reviewed. Please follow medication instructions below  It is okay to drink a sip of water with meds morning of procedure  Diuretics- Hold diuretics (water pill) for comfort morning of procedure. Your diuretics to hold: Torsemide (Demadex), Spironolactone (Aldactone)  Metformin/Insulin-Consider holding the night before and morning of procedure if your sugars might run low because of not eating any breakfast.    Transportation: Bring someone to drive you home.     Scheduling: Fozia AN is our full time procedure . She will call you to schedule your procedure.    Allergies: Do you have an allergy to dye or contrast? No.    Labs: We need to check blood work within 30 days of your procedure (CBC & BMP). Please go to any [facility] lab to get your labs drawn prior to your procedure.

## 2025-04-10 NOTE — TELEPHONE ENCOUNTER
Patient calling in stating that she has a lot of yeast under breast to the point it is bleeding she is asking if you could send something to the pharmacy for her she states that she has been using Corn Starch under breast and she tried to use it this morning and she stated that it felt like fire.   Please advise..

## 2025-04-10 NOTE — PROGRESS NOTES
[facility]  H+P  Consult  OP Visit  FU Visit   CC/HPI   CC Followup visit for cardiac conditions detailed in assessment and plan below.   Intervention PCI   General Still complaining of intermittent cp, sob.  Similar to prestent.  LHC deferred due to anemia.     Cardiac Sx -dizziness, -syncope, -edema, -orthopnea, -pnd, -palpitations, +CP, +SOB, +fatigue   HISTORY/ALLERGY/ROS   M/S/S/F Hx Reviewed in Epic and updated as appropriate   ALLERG Sulfa antibiotics, Ultracet [tramadol-acetaminophen], Bactrim, Cephalexin, Ketorolac tromethamine, Levofloxacin, Macrobid [nitrofurantoin], and Tramadol   ROS Full ROS obtained and negative except as mentioned in HPI   MEDICATIONS   Cardiac medications reviewed in Epic during visit with patient.   PHYSICAL EXAM   Vitals /64 (BP Site: Right Upper Arm, Patient Position: Sitting, BP Cuff Size: Large Adult)   Pulse 81   Ht 1.524 m (5')   Wt 118.4 kg (261 lb)   LMP 10/15/2012   SpO2 95%   BMI 50.97 kg/m²    Gen Alert, coop, no distress Heart  RRR, 1/6   Lung CTA-B, unlabored, no DTP Extrem Edema -Grade 0 (0mm)      COMPLIANCE   Discussed and counseled on diet, exercise, weight loss, smoking, alcohol, drugs.  All questions answered.   CODING   SCI (39723) - 30-39 mins spent reviewing hx/tests/consults, performing exam, counseling/educating, ordering meds/tests/procedures, referring/communicating w/PCP/consultants, documenting in EMR, interpreting results, communicating medical information and plan with family.   SCRIBE ATTESTATION   Nurse I, Enedina Portillo, RN, am scribing for and in the presence of Attila Cantu MD. 4/10/2025 11:52 AM EDT   Doctor Enedina Portillo is working as scribe for and in presence of me and may have prepopulated components of note with my historical intellectual property (IP) under my supervision.  Any additions to this IP performed in my presence and at my direction. Content and accuracy of this note reviewed by me (Attila Cantu MD).   NEW

## 2025-04-14 ENCOUNTER — TELEPHONE (OUTPATIENT)
Dept: PULMONOLOGY | Age: 50
End: 2025-04-14

## 2025-04-14 NOTE — TELEPHONE ENCOUNTER
[facility] is asking if the progress note from 3/31/25 could be amended stating patient is  using the NIV instead of BiPAP.

## 2025-04-17 ENCOUNTER — HOSPITAL ENCOUNTER (OUTPATIENT)
Age: 50
Discharge: HOME | End: 2025-04-17
Payer: COMMERCIAL

## 2025-04-17 ENCOUNTER — OFFICE VISIT (OUTPATIENT)
Dept: CARDIOLOGY CLINIC | Age: 50
End: 2025-04-17

## 2025-04-17 VITALS
HEART RATE: 81 BPM | DIASTOLIC BLOOD PRESSURE: 64 MMHG | WEIGHT: 261 LBS | OXYGEN SATURATION: 95 % | HEIGHT: 60 IN | SYSTOLIC BLOOD PRESSURE: 126 MMHG | BODY MASS INDEX: 51.24 KG/M2

## 2025-04-17 DIAGNOSIS — I10 ESSENTIAL HYPERTENSION: ICD-10-CM

## 2025-04-17 DIAGNOSIS — E78.00 HYPERCHOLESTEROLEMIA: ICD-10-CM

## 2025-04-17 DIAGNOSIS — I25.10 CORONARY ARTERY DISEASE DUE TO LIPID RICH PLAQUE: ICD-10-CM

## 2025-04-17 DIAGNOSIS — I25.10 CORONARY ARTERY DISEASE DUE TO LIPID RICH PLAQUE: Primary | ICD-10-CM

## 2025-04-17 DIAGNOSIS — D50.9 IRON DEFICIENCY ANEMIA, UNSPECIFIED IRON DEFICIENCY ANEMIA TYPE: ICD-10-CM

## 2025-04-17 DIAGNOSIS — Z95.5 HISTORY OF CORONARY ARTERY STENT PLACEMENT: ICD-10-CM

## 2025-04-17 DIAGNOSIS — Z87.891 HISTORY OF TOBACCO USE: ICD-10-CM

## 2025-04-17 DIAGNOSIS — I25.83 CORONARY ARTERY DISEASE DUE TO LIPID RICH PLAQUE: Primary | ICD-10-CM

## 2025-04-17 DIAGNOSIS — I25.83 CORONARY ARTERY DISEASE DUE TO LIPID RICH PLAQUE: ICD-10-CM

## 2025-04-17 LAB
ALBUMIN SERPL-MCNC: 3.9 G/DL (ref 3.4–5)
ALBUMIN/GLOB SERPL: 1.4 {RATIO} (ref 1.1–2.2)
ALP SERPL-CCNC: 178 U/L (ref 40–129)
ALT SERPL-CCNC: 34 U/L (ref 10–40)
ANION GAP SERPL CALCULATED.3IONS-SCNC: 12 MMOL/L (ref 3–16)
AST SERPL-CCNC: 30 U/L (ref 15–37)
BASOPHILS # BLD: 0 K/UL (ref 0–0.2)
BASOPHILS NFR BLD: 0.3 %
BILIRUB SERPL-MCNC: <0.2 MG/DL (ref 0–1)
BUN SERPL-MCNC: 23 MG/DL (ref 7–20)
CALCIUM SERPL-MCNC: 9.5 MG/DL (ref 8.3–10.6)
CHLORIDE SERPL-SCNC: 96 MMOL/L (ref 99–110)
CO2 SERPL-SCNC: 29 MMOL/L (ref 21–32)
CREAT SERPL-MCNC: 0.9 MG/DL (ref 0.6–1.1)
DEPRECATED RDW RBC AUTO: 20.6 % (ref 12.4–15.4)
EOSINOPHIL # BLD: 0.1 K/UL (ref 0–0.6)
EOSINOPHIL NFR BLD: 0.4 %
GFR SERPLBLD CREATININE-BSD FMLA CKD-EPI: 78 ML/MIN/{1.73_M2}
GLUCOSE SERPL-MCNC: 228 MG/DL (ref 70–99)
HCT VFR BLD AUTO: 29.7 % (ref 36–48)
HGB BLD-MCNC: 9 G/DL (ref 12–16)
LYMPHOCYTES # BLD: 1.1 K/UL (ref 1–5.1)
LYMPHOCYTES NFR BLD: 7.1 %
MCH RBC QN AUTO: 23.2 PG (ref 26–34)
MCHC RBC AUTO-ENTMCNC: 30.2 G/DL (ref 31–36)
MCV RBC AUTO: 76.6 FL (ref 80–100)
MONOCYTES # BLD: 0.8 K/UL (ref 0–1.3)
MONOCYTES NFR BLD: 5.3 %
NEUTROPHILS # BLD: 13.3 K/UL (ref 1.7–7.7)
NEUTROPHILS NFR BLD: 86.9 %
PLATELET # BLD AUTO: 318 K/UL (ref 135–450)
PMV BLD AUTO: 8.5 FL (ref 5–10.5)
POTASSIUM SERPL-SCNC: 4.5 MMOL/L (ref 3.5–5.1)
PROT SERPL-MCNC: 6.7 G/DL (ref 6.4–8.2)
RBC # BLD AUTO: 3.88 M/UL (ref 4–5.2)
SODIUM SERPL-SCNC: 137 MMOL/L (ref 136–145)
WBC # BLD AUTO: 15.3 K/UL (ref 4–11)

## 2025-04-17 PROCEDURE — 80053 COMPREHEN METABOLIC PANEL: CPT

## 2025-04-17 PROCEDURE — 85025 COMPLETE CBC W/AUTO DIFF WBC: CPT

## 2025-04-17 PROCEDURE — 36415 COLL VENOUS BLD VENIPUNCTURE: CPT

## 2025-04-18 ENCOUNTER — RESULTS FOLLOW-UP (OUTPATIENT)
Dept: CARDIOLOGY | Age: 50
End: 2025-04-18

## 2025-04-21 RX ORDER — PANTOPRAZOLE SODIUM 40 MG/1
80 TABLET, DELAYED RELEASE ORAL
Qty: 180 TABLET | Refills: 1 | OUTPATIENT
Start: 2025-04-21

## 2025-04-24 DIAGNOSIS — J44.9 COPD, SEVERITY TO BE DETERMINED (HCC): ICD-10-CM

## 2025-04-25 RX ORDER — IPRATROPIUM BROMIDE AND ALBUTEROL SULFATE 2.5; .5 MG/3ML; MG/3ML
1 SOLUTION RESPIRATORY (INHALATION) EVERY 4 HOURS
Qty: 360 ML | Refills: 3 | Status: SHIPPED | OUTPATIENT
Start: 2025-04-25

## 2025-05-01 DIAGNOSIS — E66.01 MORBID OBESITY (HCC): ICD-10-CM

## 2025-05-01 DIAGNOSIS — Z79.4 CONTROLLED TYPE 2 DIABETES MELLITUS WITH MICROALBUMINURIA, WITH LONG-TERM CURRENT USE OF INSULIN (HCC): ICD-10-CM

## 2025-05-01 DIAGNOSIS — E11.29 CONTROLLED TYPE 2 DIABETES MELLITUS WITH MICROALBUMINURIA, WITH LONG-TERM CURRENT USE OF INSULIN (HCC): ICD-10-CM

## 2025-05-01 DIAGNOSIS — R80.9 CONTROLLED TYPE 2 DIABETES MELLITUS WITH MICROALBUMINURIA, WITH LONG-TERM CURRENT USE OF INSULIN (HCC): ICD-10-CM

## 2025-05-01 RX ORDER — INSULIN GLARGINE 300 U/ML
INJECTION, SOLUTION SUBCUTANEOUS
Qty: 5 ADJUSTABLE DOSE PRE-FILLED PEN SYRINGE | Refills: 4 | Status: SHIPPED | OUTPATIENT
Start: 2025-05-01

## 2025-05-01 NOTE — TELEPHONE ENCOUNTER
Call from pt stating that she received the wrong Humalog kwikpen and was also given toujeo instead of the Lantus      Pt stated that the pharmacy gave her the Humalog kwikpen 100 unit  Pt stated that she explained to them that she was to get the u-200 but they gave her the 100u    Pt is wanting to know if we could reach out to the pharmacy to get her insulin corrected, she is also requesting a call back as well     Please advise   CB# [phone]

## 2025-05-01 NOTE — TELEPHONE ENCOUNTER
Medication:   Requested Prescriptions     Pending Prescriptions Disp Refills    insulin lispro (HUMALOG KWIKPEN U-200) 200 UNIT/ML SOPN pen 2 Adjustable Dose Pre-filled Pen Syringe 3     Sig: Take upto 40 units with each meal    Insulin Glargine, 2 Unit Dial, (TOUJEO MAX SOLOSTAR) 300 UNIT/ML concentrated injection pen 5 Adjustable Dose Pre-filled Pen Syringe 4     Sig: Take upto 80 units daily start with 50 units daily       Last Filled:      Patient Phone Number: [phone] (home)     Last appt: 3/11/2025   Next appt: 5/21/2025    Last Labs DM:   Lab Results   Component Value Date/Time    LABA1C 6.0 01/21/2025 05:21 AM

## 2025-05-06 DIAGNOSIS — Z79.4 CONTROLLED TYPE 2 DIABETES MELLITUS WITH MICROALBUMINURIA, WITH LONG-TERM CURRENT USE OF INSULIN (HCC): ICD-10-CM

## 2025-05-06 DIAGNOSIS — R80.9 CONTROLLED TYPE 2 DIABETES MELLITUS WITH MICROALBUMINURIA, WITH LONG-TERM CURRENT USE OF INSULIN (HCC): ICD-10-CM

## 2025-05-06 DIAGNOSIS — E11.29 CONTROLLED TYPE 2 DIABETES MELLITUS WITH MICROALBUMINURIA, WITH LONG-TERM CURRENT USE OF INSULIN (HCC): ICD-10-CM

## 2025-05-07 RX ORDER — INSULIN GLARGINE 100 [IU]/ML
INJECTION, SOLUTION SUBCUTANEOUS
Qty: 24 ADJUSTABLE DOSE PRE-FILLED PEN SYRINGE | Refills: 1 | Status: SHIPPED | OUTPATIENT
Start: 2025-05-07

## 2025-05-12 ENCOUNTER — TELEPHONE (OUTPATIENT)
Dept: CARDIOLOGY CLINIC | Age: 50
End: 2025-05-12

## 2025-05-12 RX ORDER — PANTOPRAZOLE SODIUM 40 MG/1
80 TABLET, DELAYED RELEASE ORAL
Qty: 180 TABLET | Refills: 0 | OUTPATIENT
Start: 2025-05-12

## 2025-05-12 NOTE — TELEPHONE ENCOUNTER
Patient requesting to know if KARI wants her to recontinue Plavix after being off of it since her GI surgery in March 2025.    She also wanted to inform KARI that she has started iron infusions and her last one will be on 5/21    Please advise

## 2025-05-12 NOTE — TELEPHONE ENCOUNTER
Prescription refill    Last OV:03/31/2025    Last Refill:03/31/2025    Labs:04/17/2025    Future Appt:06/04/2025

## 2025-05-12 NOTE — TELEPHONE ENCOUNTER
Why is she taking 2 of the protonix twice a day that is a really high dose  She has seen Dr Madrid in the past and he should order this

## 2025-05-12 NOTE — TELEPHONE ENCOUNTER
Called pt and relayed message below. Pt states verbal understanding.   One of the doctors while she was in the hospital prescribed that. She is also taking 3 sucralfate (CARAFATE) 1 GM tablet  a day.  She will call Dr. Madrid about the refill and let him know the dosage she was given in the hospital.

## 2025-05-14 NOTE — TELEPHONE ENCOUNTER
Spoke to pt about message below she verbalized understanding.     Attila Cantu MD to Enedina Portillo RN      5/12/25  3:43 PM  Continue asa 81, no plavix    While on the phone the pt wanted to know where her hemoglobin has to be in order for DCE to do her angiogram?

## 2025-05-21 ENCOUNTER — TELEMEDICINE (OUTPATIENT)
Dept: ENDOCRINOLOGY | Age: 50
End: 2025-05-21
Payer: COMMERCIAL

## 2025-05-21 DIAGNOSIS — J44.9 COPD, SEVERITY TO BE DETERMINED (HCC): ICD-10-CM

## 2025-05-21 DIAGNOSIS — I25.10 CORONARY ARTERY DISEASE INVOLVING NATIVE CORONARY ARTERY OF NATIVE HEART WITHOUT ANGINA PECTORIS: ICD-10-CM

## 2025-05-21 DIAGNOSIS — Z79.4 TYPE 2 DIABETES MELLITUS WITHOUT COMPLICATION, WITH LONG-TERM CURRENT USE OF INSULIN (HCC): Primary | Chronic | ICD-10-CM

## 2025-05-21 DIAGNOSIS — E11.9 TYPE 2 DIABETES MELLITUS WITHOUT COMPLICATION, WITH LONG-TERM CURRENT USE OF INSULIN (HCC): Primary | Chronic | ICD-10-CM

## 2025-05-21 DIAGNOSIS — K21.9 CHRONIC GERD: ICD-10-CM

## 2025-05-21 PROCEDURE — 99214 OFFICE O/P EST MOD 30 MIN: CPT | Performed by: INTERNAL MEDICINE

## 2025-05-21 NOTE — PROGRESS NOTES
lispro (HUMALOG KWIKPEN U-200) 200 UNIT/ML SOPN pen Take upto 40 units with each meal (Patient taking differently: Inject 55 Units into the skin Take upto 40 units with each meal) 2 Adjustable Dose Pre-filled Pen Syringe 3    Insulin Glargine, 2 Unit Dial, (TOUJEO MAX SOLOSTAR) 300 UNIT/ML concentrated injection pen Take upto 80 units daily start with 50 units daily (Patient taking differently: Inject 40 Units into the skin in the morning and at bedtime Take upto 80 units daily start with 50 units daily) 5 Adjustable Dose Pre-filled Pen Syringe 4    ipratropium 0.5 mg-albuterol 2.5 mg (DUONEB) 0.5-2.5 (3) MG/3ML SOLN nebulizer solution INHALE 3 MLS INTO THE LUNGS EVERY 4 HOURS 360 mL 3    predniSONE (DELTASONE) 5 MG tablet Take 1 tablet by mouth daily 30 tablet 5    nicotine (NICODERM CQ) 14 MG/24HR Place 1 patch onto the skin every 24 hours 30 patch 5    pantoprazole (PROTONIX) 40 MG tablet Take 1 tablet by mouth 2 times daily (before meals) 30 tablet 3    diazePAM (VALIUM) 10 MG tablet Take 1 tablet by mouth 3 times daily as needed for Anxiety.      torsemide (DEMADEX) 20 MG tablet Take 3 tablets by mouth daily 90 tablet 3    tiotropium (SPIRIVA RESPIMAT) 2.5 MCG/ACT AERS inhaler Inhale 2 puffs into the lungs daily 4 g 5    OXYGEN Inhale 2 L into the lungs continuous 2.5 L NC at Daytime    4 L at Night with BiPAP      sucralfate (CARAFATE) 1 GM tablet Take 1 tablet by mouth 3 times daily (before meals) 120 tablet 3    guaiFENesin (MUCINEX) 600 MG extended release tablet Take 2 tablets by mouth 2 times daily      aspirin 81 MG EC tablet Take 1 tablet by mouth daily 30 tablet 0    albuterol sulfate HFA (PROVENTIL HFA) 108 (90 Base) MCG/ACT inhaler Inhale 2 puffs into the lungs every 4 hours as needed for Wheezing 1 each 0    metoprolol succinate (TOPROL XL) 25 MG extended release tablet Take 1 tablet by mouth daily 30 tablet 3    budesonide-formoterol (SYMBICORT) 160-4.5 MCG/ACT AERO Inhale 2 puffs into the lungs in

## 2025-05-21 NOTE — TELEPHONE ENCOUNTER
Spoke to pt about message below she verbalized understanding.       Attila Cantu MD  You2 days ago       Needs to be stable for several months prior to cath without transfusions.    Ralph

## 2025-06-02 RX ORDER — PANTOPRAZOLE SODIUM 40 MG/1
80 TABLET, DELAYED RELEASE ORAL
Qty: 180 TABLET | Refills: 1 | OUTPATIENT
Start: 2025-06-02

## 2025-06-06 NOTE — TELEPHONE ENCOUNTER
Called pt to discuss how they are feeling.  Patient is having swelling in her legs, started last evening.  Patient has not taken medicine for 3 days because she ran out.  Patient said the medication was working well.      Needs metoprolol refill as well.      Has your SOB gotten worse?  no, stuffy from cold    Any recent changes in your diet?  no    Have you had any recent medication changes?  yes. Has not taken spironolactone for 3 days    Are you taking your diuretics as prescribed?  no, hasn't taken her spironolactone in 3 days. She ran out    Do you have any swelling going on?  yes. Bilateral ankles/feet    Have you had any recent weight gain or loss? no    Are you feeling more fatigued lately?  yes, just stuffy    Do you have difficulty lying flat? no    Do you have a cough? no

## 2025-06-06 NOTE — TELEPHONE ENCOUNTER
Medication Refill    Medication needing refilled:spironolactone (ALDACTONE)     Dosage of the medication:25 MG tablet     How are you taking this medication (QD, BID, TID, QID, PRN):Take 1 tablet by mouth daily     30 or 90 day supply called in:30 tablet     When will you run out of your medication:Completely out of medication    Which Pharmacy are we sending the medication to?:    [facility].    Patient is having swelling in her legs, started last evening.  Patient has not taken medicine for 3 days.  Patient said the medication was working well.

## 2025-06-09 ASSESSMENT — ENCOUNTER SYMPTOMS
GASTROINTESTINAL NEGATIVE: 1
SHORTNESS OF BREATH: 1

## 2025-06-09 NOTE — PROGRESS NOTES
[facility]   Congestive Heart Failure    Primary Care Doctor:  Cody Solano MD  Primary Cardiologist: julian       Chief Complaint:  chf    History of Present Illness  The patient is a 49-year-old female with CAD, PCI, COPD, diabetes mellitus, hypertension, dyslipidemia, narcolepsy, obesity who presents today for a heart failure follow-up.    She is out of janna and metoprolol due to missed f/u and her wt is 20lb over baseline today. She is fairly noncompliant with dietary guidelines and is very sedentary.  She experiences occasional leg swelling and SOB but denies CP, palpitations or dizziness. She is to have LHC when Hgb is stable.    She received her final iron infusion today   She is taking torsemide 40mg BID       Today's home wt: 263    Baseline Weight: 245  Wt Readings from Last 3 Encounters:   06/10/25 121.6 kg (268 lb)   04/17/25 118.4 kg (261 lb)   03/31/25 116.8 kg (257 lb 9.6 oz)         EF: 55-60%  Cardiac Imaging:  Echo 1/14/2025:    Left Ventricle: Normal left ventricular systolic function with a visually estimated EF of 55 - 60%. Left ventricle size is normal. Normal wall thickness. Unable to assess wall motion.    Right Ventricle: Not well visualized.    Image quality is poor. Contrast used: Lumason. Technically difficult study due to patient's body habitus.     CTA 1/13/2025:  IMPRESSION:  1. No evidence of pulmonary embolism.  2. Mosaic attenuation within both lungs, which may be related to expiratory  phase of imaging versus air trapping or small vessel/airways disease.  3. The heart and pericardium demonstrate no acute abnormality.  Coronary artery  calcifications.  There is no acute abnormality of the thoracic aorta.     Cardiac cath [facility] 11/22/2022:  LVEDP was elevated at 23mmHg.  There was a mild pressure gradient on   pullback across the aortic valve.     Patent mid LAD and RCA (mid and distal) stents.  There is otherwise   mild-to-moderate disease, as described.

## 2025-06-10 ENCOUNTER — HOSPITAL ENCOUNTER (OUTPATIENT)
Age: 50
Discharge: HOME | End: 2025-06-10
Payer: COMMERCIAL

## 2025-06-10 ENCOUNTER — OFFICE VISIT (OUTPATIENT)
Dept: CARDIOLOGY CLINIC | Age: 50
End: 2025-06-10
Payer: COMMERCIAL

## 2025-06-10 VITALS
HEART RATE: 71 BPM | BODY MASS INDEX: 52.61 KG/M2 | OXYGEN SATURATION: 95 % | DIASTOLIC BLOOD PRESSURE: 60 MMHG | WEIGHT: 268 LBS | SYSTOLIC BLOOD PRESSURE: 110 MMHG | HEIGHT: 60 IN

## 2025-06-10 DIAGNOSIS — I25.83 CORONARY ARTERY DISEASE DUE TO LIPID RICH PLAQUE: ICD-10-CM

## 2025-06-10 DIAGNOSIS — I25.10 CORONARY ARTERY DISEASE DUE TO LIPID RICH PLAQUE: ICD-10-CM

## 2025-06-10 DIAGNOSIS — I50.32 CHRONIC DIASTOLIC CONGESTIVE HEART FAILURE (HCC): Primary | ICD-10-CM

## 2025-06-10 DIAGNOSIS — R06.09 DYSPNEA ON EXERTION: ICD-10-CM

## 2025-06-10 DIAGNOSIS — I50.32 CHRONIC DIASTOLIC CONGESTIVE HEART FAILURE (HCC): ICD-10-CM

## 2025-06-10 DIAGNOSIS — D50.0 IRON DEFICIENCY ANEMIA DUE TO CHRONIC BLOOD LOSS: ICD-10-CM

## 2025-06-10 DIAGNOSIS — I10 ESSENTIAL HYPERTENSION: ICD-10-CM

## 2025-06-10 LAB
ANION GAP SERPL CALCULATED.3IONS-SCNC: 13 MMOL/L (ref 3–16)
BUN SERPL-MCNC: 15 MG/DL (ref 7–20)
CALCIUM SERPL-MCNC: 9.9 MG/DL (ref 8.3–10.6)
CHLORIDE SERPL-SCNC: 100 MMOL/L (ref 99–110)
CO2 SERPL-SCNC: 28 MMOL/L (ref 21–32)
CREAT SERPL-MCNC: 0.8 MG/DL (ref 0.6–1.1)
DEPRECATED RDW RBC AUTO: 21.8 % (ref 12.4–15.4)
GFR SERPLBLD CREATININE-BSD FMLA CKD-EPI: 89 ML/MIN/{1.73_M2}
GLUCOSE SERPL-MCNC: 206 MG/DL (ref 70–99)
HCT VFR BLD AUTO: 32.3 % (ref 36–48)
HGB BLD-MCNC: 10.2 G/DL (ref 12–16)
MCH RBC QN AUTO: 24.9 PG (ref 26–34)
MCHC RBC AUTO-ENTMCNC: 31.6 G/DL (ref 31–36)
MCV RBC AUTO: 79 FL (ref 80–100)
NT-PROBNP SERPL-MCNC: 148 PG/ML (ref 0–124)
PLATELET # BLD AUTO: 257 K/UL (ref 135–450)
PMV BLD AUTO: 8.6 FL (ref 5–10.5)
POTASSIUM SERPL-SCNC: 4.5 MMOL/L (ref 3.5–5.1)
RBC # BLD AUTO: 4.09 M/UL (ref 4–5.2)
SODIUM SERPL-SCNC: 141 MMOL/L (ref 136–145)
WBC # BLD AUTO: 11.1 K/UL (ref 4–11)

## 2025-06-10 PROCEDURE — 3074F SYST BP LT 130 MM HG: CPT | Performed by: NURSE PRACTITIONER

## 2025-06-10 PROCEDURE — G8427 DOCREV CUR MEDS BY ELIG CLIN: HCPCS | Performed by: NURSE PRACTITIONER

## 2025-06-10 PROCEDURE — 3078F DIAST BP <80 MM HG: CPT | Performed by: NURSE PRACTITIONER

## 2025-06-10 PROCEDURE — 85027 COMPLETE CBC AUTOMATED: CPT

## 2025-06-10 PROCEDURE — 3017F COLORECTAL CA SCREEN DOC REV: CPT | Performed by: NURSE PRACTITIONER

## 2025-06-10 PROCEDURE — 80048 BASIC METABOLIC PNL TOTAL CA: CPT

## 2025-06-10 PROCEDURE — 36415 COLL VENOUS BLD VENIPUNCTURE: CPT

## 2025-06-10 PROCEDURE — 99214 OFFICE O/P EST MOD 30 MIN: CPT | Performed by: NURSE PRACTITIONER

## 2025-06-10 PROCEDURE — 1036F TOBACCO NON-USER: CPT | Performed by: NURSE PRACTITIONER

## 2025-06-10 PROCEDURE — G8417 CALC BMI ABV UP PARAM F/U: HCPCS | Performed by: NURSE PRACTITIONER

## 2025-06-10 PROCEDURE — 83880 ASSAY OF NATRIURETIC PEPTIDE: CPT

## 2025-06-10 RX ORDER — METOPROLOL SUCCINATE 25 MG/1
25 TABLET, EXTENDED RELEASE ORAL DAILY
Qty: 90 TABLET | Refills: 1 | Status: SHIPPED | OUTPATIENT
Start: 2025-06-10 | End: 2025-06-10 | Stop reason: SDUPTHER

## 2025-06-10 RX ORDER — METOPROLOL SUCCINATE 25 MG/1
25 TABLET, EXTENDED RELEASE ORAL DAILY
Qty: 90 TABLET | Refills: 3 | Status: SHIPPED | OUTPATIENT
Start: 2025-06-10

## 2025-06-10 RX ORDER — SPIRONOLACTONE 25 MG/1
25 TABLET ORAL DAILY
Qty: 30 TABLET | Refills: 3 | Status: SHIPPED | OUTPATIENT
Start: 2025-06-10 | End: 2025-06-10 | Stop reason: SDUPTHER

## 2025-06-10 RX ORDER — SPIRONOLACTONE 25 MG/1
25 TABLET ORAL DAILY
Qty: 90 TABLET | Refills: 1 | Status: SHIPPED | OUTPATIENT
Start: 2025-06-10

## 2025-06-10 NOTE — PATIENT INSTRUCTIONS
Visit Summary and Instructions:     Action Items for you:   Weigh yourself every day in the morning after urination, call Isa if your wt increases 2-3lb in one day or 5lb in one week -- [phone]  Limit sodium to 3000mg/day and fluids to 2L or 64oz/day.   3.   Check your blood pressure daily and call if the top number is under 90 or over 165       Medications: no changes in meds today         Tests ordered today:   Today and monthly       Follow Up:   Schedule an appointment with Isa for 8 weeks

## 2025-06-11 ENCOUNTER — RESULTS FOLLOW-UP (OUTPATIENT)
Dept: CARDIOLOGY CLINIC | Age: 50
End: 2025-06-11

## 2025-06-11 NOTE — TELEPHONE ENCOUNTER
----- Message from YOCASTA Rizvi CNP sent at 6/11/2025  9:02 AM EDT -----  Labs ok, hgb is 10 which is improved, fluid number is up a little, have here increase torsemide for the next week. KJV

## 2025-06-18 RX ORDER — TORSEMIDE 20 MG/1
40 TABLET ORAL 2 TIMES DAILY
Qty: 360 TABLET | Refills: 1 | Status: SHIPPED | OUTPATIENT
Start: 2025-06-18

## 2025-07-25 ENCOUNTER — HOSPITAL ENCOUNTER (OUTPATIENT)
Age: 50
Discharge: HOME | End: 2025-07-25
Payer: COMMERCIAL

## 2025-07-25 ENCOUNTER — OFFICE VISIT (OUTPATIENT)
Dept: PULMONOLOGY | Age: 50
End: 2025-07-25
Payer: COMMERCIAL

## 2025-07-25 ENCOUNTER — TELEPHONE (OUTPATIENT)
Dept: CARDIOLOGY CLINIC | Age: 50
End: 2025-07-25

## 2025-07-25 VITALS
WEIGHT: 256 LBS | DIASTOLIC BLOOD PRESSURE: 72 MMHG | HEIGHT: 60 IN | SYSTOLIC BLOOD PRESSURE: 112 MMHG | HEART RATE: 74 BPM | BODY MASS INDEX: 50.26 KG/M2 | OXYGEN SATURATION: 94 %

## 2025-07-25 DIAGNOSIS — E11.9 TYPE 2 DIABETES MELLITUS WITHOUT COMPLICATION, WITH LONG-TERM CURRENT USE OF INSULIN (HCC): Chronic | ICD-10-CM

## 2025-07-25 DIAGNOSIS — R25.1 TREMORS OF NERVOUS SYSTEM: Primary | ICD-10-CM

## 2025-07-25 DIAGNOSIS — J96.11 CHRONIC HYPOXEMIC RESPIRATORY FAILURE (HCC): ICD-10-CM

## 2025-07-25 DIAGNOSIS — R06.02 SOB (SHORTNESS OF BREATH): ICD-10-CM

## 2025-07-25 DIAGNOSIS — G47.33 OBSTRUCTIVE SLEEP APNEA: ICD-10-CM

## 2025-07-25 DIAGNOSIS — K21.9 CHRONIC GERD: ICD-10-CM

## 2025-07-25 DIAGNOSIS — I25.10 CORONARY ARTERY DISEASE INVOLVING NATIVE CORONARY ARTERY OF NATIVE HEART WITHOUT ANGINA PECTORIS: ICD-10-CM

## 2025-07-25 DIAGNOSIS — I50.32 CHRONIC DIASTOLIC CONGESTIVE HEART FAILURE (HCC): ICD-10-CM

## 2025-07-25 DIAGNOSIS — Z79.4 TYPE 2 DIABETES MELLITUS WITHOUT COMPLICATION, WITH LONG-TERM CURRENT USE OF INSULIN (HCC): Chronic | ICD-10-CM

## 2025-07-25 DIAGNOSIS — J44.9 COPD, SEVERITY TO BE DETERMINED (HCC): ICD-10-CM

## 2025-07-25 DIAGNOSIS — D50.0 IRON DEFICIENCY ANEMIA DUE TO CHRONIC BLOOD LOSS: ICD-10-CM

## 2025-07-25 DIAGNOSIS — E66.01 MORBID OBESITY (HCC): ICD-10-CM

## 2025-07-25 DIAGNOSIS — I50.32 CHRONIC DIASTOLIC HEART FAILURE (HCC): ICD-10-CM

## 2025-07-25 DIAGNOSIS — J96.12 CHRONIC RESPIRATORY FAILURE WITH HYPOXIA AND HYPERCAPNIA (HCC): Primary | ICD-10-CM

## 2025-07-25 DIAGNOSIS — R06.09 DYSPNEA ON EXERTION: ICD-10-CM

## 2025-07-25 DIAGNOSIS — J96.11 CHRONIC RESPIRATORY FAILURE WITH HYPOXIA AND HYPERCAPNIA (HCC): Primary | ICD-10-CM

## 2025-07-25 DIAGNOSIS — D50.0 IRON DEFICIENCY ANEMIA DUE TO CHRONIC BLOOD LOSS: Primary | ICD-10-CM

## 2025-07-25 LAB
ALBUMIN SERPL-MCNC: 4.1 G/DL (ref 3.4–5)
ALBUMIN/GLOB SERPL: 1.3 {RATIO} (ref 1.1–2.2)
ALP SERPL-CCNC: 137 U/L (ref 40–129)
ALT SERPL-CCNC: 24 U/L (ref 10–40)
ANION GAP SERPL CALCULATED.3IONS-SCNC: 12 MMOL/L (ref 3–16)
AST SERPL-CCNC: 25 U/L (ref 15–37)
BILIRUB SERPL-MCNC: <0.2 MG/DL (ref 0–1)
BUN SERPL-MCNC: 19 MG/DL (ref 7–20)
CALCIUM SERPL-MCNC: 10.4 MG/DL (ref 8.3–10.6)
CHLORIDE SERPL-SCNC: 96 MMOL/L (ref 99–110)
CHOLEST SERPL-MCNC: 221 MG/DL (ref 0–199)
CO2 SERPL-SCNC: 33 MMOL/L (ref 21–32)
CREAT SERPL-MCNC: 0.9 MG/DL (ref 0.6–1.1)
CREAT UR-MCNC: 114 MG/DL (ref 28–259)
DEPRECATED RDW RBC AUTO: 18.3 % (ref 12.4–15.4)
GFR SERPLBLD CREATININE-BSD FMLA CKD-EPI: 77 ML/MIN/{1.73_M2}
GLUCOSE SERPL-MCNC: 149 MG/DL (ref 70–99)
HCT VFR BLD AUTO: 35.1 % (ref 36–48)
HDLC SERPL-MCNC: 44 MG/DL (ref 40–60)
HGB BLD-MCNC: 11.2 G/DL (ref 12–16)
LDLC SERPL CALC-MCNC: 126 MG/DL
MCH RBC QN AUTO: 25.7 PG (ref 26–34)
MCHC RBC AUTO-ENTMCNC: 31.9 G/DL (ref 31–36)
MCV RBC AUTO: 80.6 FL (ref 80–100)
MICROALBUMIN UR DL<=1MG/L-MCNC: <1.2 MG/DL
MICROALBUMIN/CREAT UR: NORMAL MG/G (ref 0–30)
NT-PROBNP SERPL-MCNC: 83 PG/ML (ref 0–124)
PLATELET # BLD AUTO: 234 K/UL (ref 135–450)
PLATELET BLD QL SMEAR: ADEQUATE
PMV BLD AUTO: 9.1 FL (ref 5–10.5)
POTASSIUM SERPL-SCNC: 4.5 MMOL/L (ref 3.5–5.1)
PROT SERPL-MCNC: 7.3 G/DL (ref 6.4–8.2)
RBC # BLD AUTO: 4.36 M/UL (ref 4–5.2)
SLIDE REVIEW: ABNORMAL
SODIUM SERPL-SCNC: 141 MMOL/L (ref 136–145)
TRIGL SERPL-MCNC: 255 MG/DL (ref 0–150)
TSH SERPL DL<=0.005 MIU/L-ACNC: 1.01 UIU/ML (ref 0.27–4.2)
VLDLC SERPL CALC-MCNC: 51 MG/DL
WBC # BLD AUTO: 12.1 K/UL (ref 4–11)

## 2025-07-25 PROCEDURE — G2211 COMPLEX E/M VISIT ADD ON: HCPCS | Performed by: INTERNAL MEDICINE

## 2025-07-25 PROCEDURE — 84443 ASSAY THYROID STIM HORMONE: CPT

## 2025-07-25 PROCEDURE — 83036 HEMOGLOBIN GLYCOSYLATED A1C: CPT

## 2025-07-25 PROCEDURE — G8427 DOCREV CUR MEDS BY ELIG CLIN: HCPCS | Performed by: INTERNAL MEDICINE

## 2025-07-25 PROCEDURE — 83880 ASSAY OF NATRIURETIC PEPTIDE: CPT

## 2025-07-25 PROCEDURE — 99214 OFFICE O/P EST MOD 30 MIN: CPT | Performed by: INTERNAL MEDICINE

## 2025-07-25 PROCEDURE — 80053 COMPREHEN METABOLIC PANEL: CPT

## 2025-07-25 PROCEDURE — 82043 UR ALBUMIN QUANTITATIVE: CPT

## 2025-07-25 PROCEDURE — G8417 CALC BMI ABV UP PARAM F/U: HCPCS | Performed by: INTERNAL MEDICINE

## 2025-07-25 PROCEDURE — 80061 LIPID PANEL: CPT

## 2025-07-25 PROCEDURE — 36415 COLL VENOUS BLD VENIPUNCTURE: CPT

## 2025-07-25 PROCEDURE — 82570 ASSAY OF URINE CREATININE: CPT

## 2025-07-25 PROCEDURE — 85027 COMPLETE CBC AUTOMATED: CPT

## 2025-07-25 PROCEDURE — 3017F COLORECTAL CA SCREEN DOC REV: CPT | Performed by: INTERNAL MEDICINE

## 2025-07-25 PROCEDURE — 3074F SYST BP LT 130 MM HG: CPT | Performed by: INTERNAL MEDICINE

## 2025-07-25 PROCEDURE — 3023F SPIROM DOC REV: CPT | Performed by: INTERNAL MEDICINE

## 2025-07-25 PROCEDURE — 1036F TOBACCO NON-USER: CPT | Performed by: INTERNAL MEDICINE

## 2025-07-25 PROCEDURE — 3078F DIAST BP <80 MM HG: CPT | Performed by: INTERNAL MEDICINE

## 2025-07-25 RX ORDER — PREDNISONE 5 MG/1
5 TABLET ORAL DAILY
Qty: 30 TABLET | Refills: 5 | Status: SHIPPED | OUTPATIENT
Start: 2025-07-25

## 2025-07-25 RX ORDER — BUDESONIDE AND FORMOTEROL FUMARATE DIHYDRATE 160; 4.5 UG/1; UG/1
2 AEROSOL RESPIRATORY (INHALATION)
Qty: 10.2 G | Refills: 3 | Status: SHIPPED | OUTPATIENT
Start: 2025-07-25

## 2025-07-25 NOTE — TELEPHONE ENCOUNTER
Order placed for a single CBC    Isa, your note states standing orders  Please enter standing orders.

## 2025-07-25 NOTE — TELEPHONE ENCOUNTER
Pt is at [facility] lab to get CBC and there is no order. Per 6/10/25 office note     \"SHAQUILLE - s/p polyp removal, getting IV iron, will put in standing orders for CBC to track anemia\"    Asking fax order to [phone]

## 2025-07-25 NOTE — PROGRESS NOTES
[facility]  Follow Up Note  Dmitry Chew MD       Cassandra Bowser   YOB: 1975    Date of Visit:  7/25/2025    Assessment/Plan:  1. COPD, severity to be determined (HCC)  She was sent from the office to the ER at her last visit    Plan:  Symbicort 160/4.5 2 puffs bid  Spiriva   Duoneb HHN BID-TID  Prednisone 10 mg per day ==> 5 mg per day..    2. Chronic hypercapnic and hypoxemic   On O2 2lpm 24 h/day  She is using NIV as well  She has been a little bit less regular about NIV due to GERD symptoms.  As I asked her to see her GI doctor and consider resuming treatment for GERD so she can wear NIV on a nightly basis.    I reviewed compliance data. She is using the device nightly and has good effect.     3.  Morbid obesity, unspecified obesity type  Obesity is a contributor to her symptoms    4. Chronic GERD  She continues to be symptomatic    5. Anemia  Has an appointment with hematology coming up    6. Tobacco abuse  Nonsmoker since 1/13/25  She also quit caffeine. She was drinking up to 6 Rockstars a day  Decrease her Nicotine patch to 14 mg/24hr    F/U here 3 month     Chief Complaint   Patient presents with    4 mo    COPD       HPI  The patient presents with a chief complaint of moderate shortness of breath related to moderate COPD of many years duration. He has mild associated cough. Exertion is a modifying factor.    She is now on NIV and uses it nightly    She has been doing quite well from the pulmonary point of view.  She feels like her breathing is as good as it has been in quite a long time.  She is starting to have some trouble with GERD symptoms again.  For that reason she does not wear the NIV every night.  At one time she was on PPI but that was discontinued over concern for long-term complications.  She does see GI.  I asked her to follow-up with them to see if there is something they can do to help her symptoms so she can wear the NIV on a

## 2025-07-26 LAB
EST. AVERAGE GLUCOSE BLD GHB EST-MCNC: 142.7 MG/DL
HBA1C MFR BLD: 6.6 %

## 2025-07-28 ENCOUNTER — RESULTS FOLLOW-UP (OUTPATIENT)
Dept: CARDIOLOGY CLINIC | Age: 50
End: 2025-07-28

## 2025-07-28 NOTE — TELEPHONE ENCOUNTER
----- Message from YOCASTA Rizvi CNP sent at 7/28/2025  8:36 AM EDT -----  Labs look pretty good except LDl is very high again, blood count is stable. SELENA  Spoke to patient she verbalized understanding.

## 2025-08-18 ASSESSMENT — ENCOUNTER SYMPTOMS: GASTROINTESTINAL NEGATIVE: 1

## 2025-08-19 ENCOUNTER — HOSPITAL ENCOUNTER (OUTPATIENT)
Dept: OTHER | Age: 50
Discharge: HOME | End: 2025-08-19
Payer: COMMERCIAL

## 2025-08-19 ENCOUNTER — OFFICE VISIT (OUTPATIENT)
Dept: CARDIOLOGY CLINIC | Age: 50
End: 2025-08-19
Payer: COMMERCIAL

## 2025-08-19 VITALS
HEIGHT: 60 IN | OXYGEN SATURATION: 95 % | WEIGHT: 250 LBS | SYSTOLIC BLOOD PRESSURE: 98 MMHG | DIASTOLIC BLOOD PRESSURE: 60 MMHG | BODY MASS INDEX: 49.08 KG/M2 | HEART RATE: 66 BPM

## 2025-08-19 DIAGNOSIS — I25.10 CORONARY ARTERY DISEASE DUE TO LIPID RICH PLAQUE: Primary | ICD-10-CM

## 2025-08-19 DIAGNOSIS — R60.0 PERIPHERAL EDEMA: ICD-10-CM

## 2025-08-19 DIAGNOSIS — I10 ESSENTIAL HYPERTENSION: ICD-10-CM

## 2025-08-19 DIAGNOSIS — I50.32 CHRONIC DIASTOLIC CONGESTIVE HEART FAILURE (HCC): ICD-10-CM

## 2025-08-19 DIAGNOSIS — I25.83 CORONARY ARTERY DISEASE DUE TO LIPID RICH PLAQUE: Primary | ICD-10-CM

## 2025-08-19 DIAGNOSIS — D50.9 IRON DEFICIENCY ANEMIA, UNSPECIFIED IRON DEFICIENCY ANEMIA TYPE: ICD-10-CM

## 2025-08-19 LAB
ANION GAP SERPL CALCULATED.3IONS-SCNC: 14 MMOL/L (ref 3–16)
BUN SERPL-MCNC: 27 MG/DL (ref 7–20)
CALCIUM SERPL-MCNC: 9.8 MG/DL (ref 8.3–10.6)
CHLORIDE SERPL-SCNC: 96 MMOL/L (ref 99–110)
CO2 SERPL-SCNC: 27 MMOL/L (ref 21–32)
CREAT SERPL-MCNC: 1 MG/DL (ref 0.6–1.1)
DEPRECATED RDW RBC AUTO: 16.7 % (ref 12.4–15.4)
GFR SERPLBLD CREATININE-BSD FMLA CKD-EPI: 68 ML/MIN/{1.73_M2}
GLUCOSE SERPL-MCNC: 211 MG/DL (ref 70–99)
HCT VFR BLD AUTO: 35.4 % (ref 36–48)
HGB BLD-MCNC: 11.2 G/DL (ref 12–16)
MCH RBC QN AUTO: 25.3 PG (ref 26–34)
MCHC RBC AUTO-ENTMCNC: 31.7 G/DL (ref 31–36)
MCV RBC AUTO: 79.7 FL (ref 80–100)
NT-PROBNP SERPL-MCNC: 79 PG/ML (ref 0–124)
PLATELET # BLD AUTO: 243 K/UL (ref 135–450)
PMV BLD AUTO: 8.8 FL (ref 5–10.5)
POTASSIUM SERPL-SCNC: 5.2 MMOL/L (ref 3.5–5.1)
RBC # BLD AUTO: 4.43 M/UL (ref 4–5.2)
SODIUM SERPL-SCNC: 137 MMOL/L (ref 136–145)
WBC # BLD AUTO: 12.5 K/UL (ref 4–11)

## 2025-08-19 PROCEDURE — 3017F COLORECTAL CA SCREEN DOC REV: CPT | Performed by: NURSE PRACTITIONER

## 2025-08-19 PROCEDURE — 80048 BASIC METABOLIC PNL TOTAL CA: CPT

## 2025-08-19 PROCEDURE — 83880 ASSAY OF NATRIURETIC PEPTIDE: CPT

## 2025-08-19 PROCEDURE — G8427 DOCREV CUR MEDS BY ELIG CLIN: HCPCS | Performed by: NURSE PRACTITIONER

## 2025-08-19 PROCEDURE — 36415 COLL VENOUS BLD VENIPUNCTURE: CPT

## 2025-08-19 PROCEDURE — 3074F SYST BP LT 130 MM HG: CPT | Performed by: NURSE PRACTITIONER

## 2025-08-19 PROCEDURE — 3078F DIAST BP <80 MM HG: CPT | Performed by: NURSE PRACTITIONER

## 2025-08-19 PROCEDURE — 1036F TOBACCO NON-USER: CPT | Performed by: NURSE PRACTITIONER

## 2025-08-19 PROCEDURE — G8417 CALC BMI ABV UP PARAM F/U: HCPCS | Performed by: NURSE PRACTITIONER

## 2025-08-19 PROCEDURE — 99214 OFFICE O/P EST MOD 30 MIN: CPT | Performed by: NURSE PRACTITIONER

## 2025-08-19 PROCEDURE — 85027 COMPLETE CBC AUTOMATED: CPT

## 2025-08-19 ASSESSMENT — ENCOUNTER SYMPTOMS: RESPIRATORY NEGATIVE: 1

## 2025-08-20 ENCOUNTER — RESULTS FOLLOW-UP (OUTPATIENT)
Dept: CARDIOLOGY CLINIC | Age: 50
End: 2025-08-20

## 2025-08-21 ENCOUNTER — TELEPHONE (OUTPATIENT)
Dept: CARDIOLOGY CLINIC | Age: 50
End: 2025-08-21

## 2025-08-21 DIAGNOSIS — R07.9 CHEST PAIN, UNSPECIFIED TYPE: ICD-10-CM

## 2025-08-21 DIAGNOSIS — R06.02 SHORTNESS OF BREATH: ICD-10-CM

## 2025-08-21 DIAGNOSIS — I25.83 CORONARY ARTERY DISEASE DUE TO LIPID RICH PLAQUE: Primary | ICD-10-CM

## 2025-08-21 DIAGNOSIS — I25.10 CORONARY ARTERY DISEASE DUE TO LIPID RICH PLAQUE: Primary | ICD-10-CM

## 2025-08-26 PROBLEM — I25.10 CAD (CORONARY ARTERY DISEASE): Status: ACTIVE | Noted: 2025-08-21

## (undated) DEVICE — SOLUTION IRRIG 500ML STRL H2O NONPYROGENIC

## (undated) DEVICE — SINGLE USE AIR/WATER, SUCTION AND BIOPSY VALVES SET: Brand: ORCAPOD™

## (undated) DEVICE — MOUTHPIECE ENDOSCP L CTRL OPN AND SIDE PORTS DISP

## (undated) DEVICE — ENDOSCOPIC KIT 6X3/16 FT COLON W/ 1.1 OZ 2 GWN W/O BRSH

## (undated) DEVICE — BW-412T DISP COMBO CLEANING BRUSH: Brand: SINGLE USE COMBINATION CLEANING BRUSH

## (undated) DEVICE — TRAP SPEC RETRV CLR PLAS POLYP IN LN SUCT QUIK CTCH

## (undated) DEVICE — FORCEPS BX L240CM WRK CHN 2.8MM STD CAP W/ NDL MIC MESH

## (undated) DEVICE — AIR/WATER CLEANING ADAPTER FOR OLYMPUS® GI ENDOSCOPE: Brand: BULLDOG®

## (undated) DEVICE — SNARE COLD DIAMOND 15MM THIN

## (undated) DEVICE — GOWN AURORA NONREINF LG: Brand: MEDLINE INDUSTRIES, INC.